# Patient Record
Sex: FEMALE | URBAN - NONMETROPOLITAN AREA
[De-identification: names, ages, dates, MRNs, and addresses within clinical notes are randomized per-mention and may not be internally consistent; named-entity substitution may affect disease eponyms.]

---

## 2024-04-06 ENCOUNTER — LAB REQUISITION (OUTPATIENT)
Dept: LAB | Facility: HOSPITAL | Age: 34
End: 2024-04-06
Attending: FAMILY MEDICINE
Payer: MEDICARE

## 2024-04-06 DIAGNOSIS — I10 ESSENTIAL (PRIMARY) HYPERTENSION: ICD-10-CM

## 2024-04-06 LAB
ALBUMIN SERPL BCP-MCNC: 2 G/DL (ref 3.5–5)
ALBUMIN/GLOB SERPL: 0.3 {RATIO}
ALP SERPL-CCNC: 84 U/L (ref 37–98)
ALT SERPL W P-5'-P-CCNC: 7 U/L (ref 13–56)
ANION GAP SERPL CALCULATED.3IONS-SCNC: 15 MMOL/L (ref 7–16)
AST SERPL W P-5'-P-CCNC: 18 U/L (ref 15–37)
BASOPHILS # BLD AUTO: 0.01 K/UL (ref 0–0.2)
BASOPHILS NFR BLD AUTO: 0.1 % (ref 0–1)
BILIRUB SERPL-MCNC: 0.6 MG/DL (ref ?–1.2)
BUN SERPL-MCNC: 44 MG/DL (ref 7–18)
BUN/CREAT SERPL: 11 (ref 6–20)
CALCIUM SERPL-MCNC: 8.7 MG/DL (ref 8.5–10.1)
CHLORIDE SERPL-SCNC: 100 MMOL/L (ref 98–107)
CO2 SERPL-SCNC: 26 MMOL/L (ref 21–32)
CREAT SERPL-MCNC: 4.13 MG/DL (ref 0.55–1.02)
DIFFERENTIAL METHOD BLD: ABNORMAL
EGFR (NO RACE VARIABLE) (RUSH/TITUS): 14 ML/MIN/1.73M2
EOSINOPHIL # BLD AUTO: 0.12 K/UL (ref 0–0.5)
EOSINOPHIL NFR BLD AUTO: 1.1 % (ref 1–4)
ERYTHROCYTE [DISTWIDTH] IN BLOOD BY AUTOMATED COUNT: 19.9 % (ref 11.5–14.5)
GLOBULIN SER-MCNC: 5.8 G/DL (ref 2–4)
GLUCOSE SERPL-MCNC: 155 MG/DL (ref 74–106)
HCT VFR BLD AUTO: 25.9 % (ref 38–47)
HGB BLD-MCNC: 8 G/DL (ref 12–16)
LYMPHOCYTES # BLD AUTO: 1.88 K/UL (ref 1–4.8)
LYMPHOCYTES NFR BLD AUTO: 16.8 % (ref 27–41)
MCH RBC QN AUTO: 29.3 PG (ref 27–31)
MCHC RBC AUTO-ENTMCNC: 30.9 G/DL (ref 32–36)
MCV RBC AUTO: 94.9 FL (ref 80–96)
MONOCYTES # BLD AUTO: 1.04 K/UL (ref 0–0.8)
MONOCYTES NFR BLD AUTO: 9.3 % (ref 2–6)
MPC BLD CALC-MCNC: 9.7 FL (ref 9.4–12.4)
NEUTROPHILS # BLD AUTO: 8.13 K/UL (ref 1.8–7.7)
NEUTROPHILS NFR BLD AUTO: 72.7 % (ref 53–65)
PLATELET # BLD AUTO: 237 K/UL (ref 150–400)
POTASSIUM SERPL-SCNC: 4.4 MMOL/L (ref 3.5–5.1)
PROT SERPL-MCNC: 7.8 G/DL (ref 6.4–8.2)
RBC # BLD AUTO: 2.73 M/UL (ref 4.2–5.4)
SODIUM SERPL-SCNC: 137 MMOL/L (ref 136–145)
WBC # BLD AUTO: 11.18 K/UL (ref 4.5–11)

## 2024-04-06 PROCEDURE — 80053 COMPREHEN METABOLIC PANEL: CPT | Performed by: FAMILY MEDICINE

## 2024-04-06 PROCEDURE — 85025 COMPLETE CBC W/AUTO DIFF WBC: CPT | Performed by: FAMILY MEDICINE

## 2024-04-17 ENCOUNTER — HOSPITAL ENCOUNTER (INPATIENT)
Facility: HOSPITAL | Age: 34
LOS: 12 days | Discharge: NURSING, REHAB OR LONG-TERM CARE | DRG: 853 | End: 2024-04-30
Attending: EMERGENCY MEDICINE | Admitting: STUDENT IN AN ORGANIZED HEALTH CARE EDUCATION/TRAINING PROGRAM
Payer: MEDICARE

## 2024-04-17 DIAGNOSIS — M32.14 OTHER SYSTEMIC LUPUS ERYTHEMATOSUS WITH GLOMERULAR DISEASE: ICD-10-CM

## 2024-04-17 DIAGNOSIS — L89.90 INFECTED DECUBITUS ULCER, UNSPECIFIED ULCER STAGE: ICD-10-CM

## 2024-04-17 DIAGNOSIS — Z99.2 ESRD (END STAGE RENAL DISEASE) ON DIALYSIS: ICD-10-CM

## 2024-04-17 DIAGNOSIS — D63.1 ANEMIA IN CHRONIC KIDNEY DISEASE, ON CHRONIC DIALYSIS: ICD-10-CM

## 2024-04-17 DIAGNOSIS — L97.909 CALCIPHYLAXIS WITH NONHEALING ULCER OF LEG: Primary | ICD-10-CM

## 2024-04-17 DIAGNOSIS — N18.6 ANEMIA IN CHRONIC KIDNEY DISEASE, ON CHRONIC DIALYSIS: ICD-10-CM

## 2024-04-17 DIAGNOSIS — L89.159 SACRAL DECUBITUS ULCER: ICD-10-CM

## 2024-04-17 DIAGNOSIS — L08.9 INFECTED DECUBITUS ULCER, UNSPECIFIED ULCER STAGE: ICD-10-CM

## 2024-04-17 DIAGNOSIS — Z99.2 ANEMIA IN CHRONIC KIDNEY DISEASE, ON CHRONIC DIALYSIS: ICD-10-CM

## 2024-04-17 DIAGNOSIS — L97.909 CALCIPHYLAXIS WITH NONHEALING ULCER OF LEG: ICD-10-CM

## 2024-04-17 DIAGNOSIS — N18.6 ESRD (END STAGE RENAL DISEASE) ON DIALYSIS: ICD-10-CM

## 2024-04-17 DIAGNOSIS — E83.59 CALCIPHYLAXIS WITH NONHEALING ULCER OF LEG: ICD-10-CM

## 2024-04-17 DIAGNOSIS — R00.0 TACHYCARDIA: ICD-10-CM

## 2024-04-17 DIAGNOSIS — F41.9 ANXIETY DISORDER, UNSPECIFIED TYPE: ICD-10-CM

## 2024-04-17 DIAGNOSIS — L89.159 PRESSURE INJURY OF SKIN OF SACRAL REGION, UNSPECIFIED INJURY STAGE: ICD-10-CM

## 2024-04-17 DIAGNOSIS — D62 ACUTE BLOOD LOSS ANEMIA: ICD-10-CM

## 2024-04-17 DIAGNOSIS — E83.59 CALCIPHYLAXIS WITH NONHEALING ULCER OF LEG: Primary | ICD-10-CM

## 2024-04-17 DIAGNOSIS — T14.8XXA WOUND INFECTION: ICD-10-CM

## 2024-04-17 DIAGNOSIS — L08.9 WOUND INFECTION: ICD-10-CM

## 2024-04-17 DIAGNOSIS — I15.9 SECONDARY HYPERTENSION: ICD-10-CM

## 2024-04-17 DIAGNOSIS — G89.18 POST-OP PAIN: ICD-10-CM

## 2024-04-17 DIAGNOSIS — E40 KWASHIORKOR: ICD-10-CM

## 2024-04-17 DIAGNOSIS — R50.9 FEVER: ICD-10-CM

## 2024-04-17 LAB
BASOPHILS # BLD AUTO: 0.02 K/UL (ref 0–0.2)
BASOPHILS NFR BLD AUTO: 0.2 % (ref 0–1)
DIFFERENTIAL METHOD BLD: ABNORMAL
EOSINOPHIL # BLD AUTO: 0.35 K/UL (ref 0–0.5)
EOSINOPHIL NFR BLD AUTO: 3.9 % (ref 1–4)
ERYTHROCYTE [DISTWIDTH] IN BLOOD BY AUTOMATED COUNT: 19.9 % (ref 11.5–14.5)
HCT VFR BLD AUTO: 22.2 % (ref 38–47)
HGB BLD-MCNC: 6.7 G/DL (ref 12–16)
IMM GRANULOCYTES # BLD AUTO: 0.1 K/UL (ref 0–0.04)
IMM GRANULOCYTES NFR BLD: 1.1 % (ref 0–0.4)
LYMPHOCYTES # BLD AUTO: 1.84 K/UL (ref 1–4.8)
LYMPHOCYTES NFR BLD AUTO: 20.7 % (ref 27–41)
MCH RBC QN AUTO: 28.2 PG (ref 27–31)
MCHC RBC AUTO-ENTMCNC: 30.2 G/DL (ref 32–36)
MCV RBC AUTO: 93.3 FL (ref 80–96)
MONOCYTES # BLD AUTO: 1.63 K/UL (ref 0–0.8)
MONOCYTES NFR BLD AUTO: 18.4 % (ref 2–6)
MPC BLD CALC-MCNC: 10.9 FL (ref 9.4–12.4)
NEUTROPHILS # BLD AUTO: 4.94 K/UL (ref 1.8–7.7)
NEUTROPHILS NFR BLD AUTO: 55.7 % (ref 53–65)
NRBC # BLD AUTO: 0 X10E3/UL
NRBC, AUTO (.00): 0 %
PLATELET # BLD AUTO: 210 K/UL (ref 150–400)
RBC # BLD AUTO: 2.38 M/UL (ref 4.2–5.4)
WBC # BLD AUTO: 8.88 K/UL (ref 4.5–11)

## 2024-04-17 PROCEDURE — 99285 EMERGENCY DEPT VISIT HI MDM: CPT | Mod: ,,, | Performed by: EMERGENCY MEDICINE

## 2024-04-17 PROCEDURE — 63600175 PHARM REV CODE 636 W HCPCS: Performed by: NURSE PRACTITIONER

## 2024-04-17 PROCEDURE — 93005 ELECTROCARDIOGRAM TRACING: CPT

## 2024-04-17 PROCEDURE — 83605 ASSAY OF LACTIC ACID: CPT | Performed by: NURSE PRACTITIONER

## 2024-04-17 PROCEDURE — 96361 HYDRATE IV INFUSION ADD-ON: CPT

## 2024-04-17 PROCEDURE — 87070 CULTURE OTHR SPECIMN AEROBIC: CPT | Performed by: NURSE PRACTITIONER

## 2024-04-17 PROCEDURE — 93010 ELECTROCARDIOGRAM REPORT: CPT | Mod: ,,, | Performed by: STUDENT IN AN ORGANIZED HEALTH CARE EDUCATION/TRAINING PROGRAM

## 2024-04-17 PROCEDURE — 96374 THER/PROPH/DIAG INJ IV PUSH: CPT

## 2024-04-17 PROCEDURE — 85610 PROTHROMBIN TIME: CPT | Performed by: NURSE PRACTITIONER

## 2024-04-17 PROCEDURE — 99285 EMERGENCY DEPT VISIT HI MDM: CPT | Mod: 25

## 2024-04-17 PROCEDURE — 36415 COLL VENOUS BLD VENIPUNCTURE: CPT | Performed by: NURSE PRACTITIONER

## 2024-04-17 PROCEDURE — 85025 COMPLETE CBC W/AUTO DIFF WBC: CPT | Performed by: NURSE PRACTITIONER

## 2024-04-17 PROCEDURE — 87040 BLOOD CULTURE FOR BACTERIA: CPT | Performed by: NURSE PRACTITIONER

## 2024-04-17 RX ORDER — HYDROMORPHONE HYDROCHLORIDE 2 MG/ML
1 INJECTION, SOLUTION INTRAMUSCULAR; INTRAVENOUS; SUBCUTANEOUS
Status: COMPLETED | OUTPATIENT
Start: 2024-04-17 | End: 2024-04-17

## 2024-04-17 RX ORDER — LEVOFLOXACIN 500 MG/1
500 TABLET, FILM COATED ORAL DAILY
Status: ON HOLD | COMMUNITY
Start: 2024-04-05 | End: 2024-04-30 | Stop reason: HOSPADM

## 2024-04-17 RX ORDER — FAMOTIDINE 20 MG/1
20 TABLET, FILM COATED ORAL DAILY
COMMUNITY

## 2024-04-17 RX ORDER — SEVELAMER CARBONATE 800 MG/1
1600 TABLET, FILM COATED ORAL
COMMUNITY
Start: 2024-04-04 | End: 2025-04-04

## 2024-04-17 RX ORDER — AMOXICILLIN 250 MG
2 CAPSULE ORAL 2 TIMES DAILY
COMMUNITY
Start: 2024-04-04 | End: 2025-04-04

## 2024-04-17 RX ORDER — OLANZAPINE 5 MG/1
5 TABLET, ORALLY DISINTEGRATING ORAL NIGHTLY
COMMUNITY
Start: 2024-04-04 | End: 2025-04-04

## 2024-04-17 RX ORDER — ONDANSETRON 4 MG/1
4 TABLET, ORALLY DISINTEGRATING ORAL EVERY 8 HOURS PRN
COMMUNITY
Start: 2024-04-04

## 2024-04-17 RX ORDER — ISOSORBIDE MONONITRATE 30 MG/1
30 TABLET, EXTENDED RELEASE ORAL DAILY
COMMUNITY
Start: 2024-04-05 | End: 2025-04-05

## 2024-04-17 RX ORDER — ALBUTEROL SULFATE 0.83 MG/ML
2.5 SOLUTION RESPIRATORY (INHALATION)
COMMUNITY
Start: 2024-04-04 | End: 2025-04-04

## 2024-04-17 RX ORDER — HYDRALAZINE HYDROCHLORIDE 100 MG/1
100 TABLET, FILM COATED ORAL EVERY 8 HOURS
COMMUNITY
Start: 2024-04-04 | End: 2025-04-04

## 2024-04-17 RX ORDER — OXYCODONE HYDROCHLORIDE 15 MG/1
15 TABLET ORAL EVERY 6 HOURS PRN
COMMUNITY
Start: 2024-04-08

## 2024-04-17 RX ORDER — CARVEDILOL 25 MG/1
1 TABLET ORAL 2 TIMES DAILY WITH MEALS
COMMUNITY
Start: 2024-01-23

## 2024-04-17 RX ORDER — BUPROPION HYDROCHLORIDE 75 MG/1
75 TABLET ORAL 2 TIMES DAILY
COMMUNITY
Start: 2024-01-23 | End: 2025-01-22

## 2024-04-17 RX ADMIN — HYDROMORPHONE HYDROCHLORIDE 1 MG: 2 INJECTION, SOLUTION INTRAMUSCULAR; INTRAVENOUS; SUBCUTANEOUS at 11:04

## 2024-04-18 ENCOUNTER — ANESTHESIA EVENT (OUTPATIENT)
Dept: SURGERY | Facility: HOSPITAL | Age: 34
DRG: 853 | End: 2024-04-18
Payer: MEDICARE

## 2024-04-18 ENCOUNTER — ANESTHESIA (OUTPATIENT)
Dept: SURGERY | Facility: HOSPITAL | Age: 34
DRG: 853 | End: 2024-04-18
Payer: MEDICARE

## 2024-04-18 PROBLEM — L89.90 PRESSURE INJURY OF SKIN WITH INFECTION: Status: ACTIVE | Noted: 2024-04-18

## 2024-04-18 PROBLEM — N25.81 SECONDARY HYPERPARATHYROIDISM: Status: ACTIVE | Noted: 2024-04-18

## 2024-04-18 PROBLEM — L89.159 SACRAL DECUBITUS ULCER: Status: ACTIVE | Noted: 2024-04-18

## 2024-04-18 PROBLEM — R82.81 PYURIA: Status: ACTIVE | Noted: 2024-04-18

## 2024-04-18 PROBLEM — A41.9 SEPSIS: Status: ACTIVE | Noted: 2024-04-18

## 2024-04-18 PROBLEM — L08.9 PRESSURE INJURY OF SKIN WITH INFECTION: Status: ACTIVE | Noted: 2024-04-18

## 2024-04-18 PROBLEM — S81.809A MULTIPLE OPEN WOUNDS OF LOWER LEG: Status: ACTIVE | Noted: 2024-04-18

## 2024-04-18 LAB
ABORH RETYPE: NORMAL
ALBUMIN SERPL BCP-MCNC: 1.9 G/DL (ref 3.5–5)
ALBUMIN/GLOB SERPL: 0.4 {RATIO}
ALP SERPL-CCNC: 72 U/L (ref 37–98)
ALT SERPL W P-5'-P-CCNC: <6 U/L (ref 13–56)
ANION GAP SERPL CALCULATED.3IONS-SCNC: 11 MMOL/L (ref 7–16)
APTT PPP: 43.7 SECONDS (ref 25.2–37.3)
AST SERPL W P-5'-P-CCNC: <3 U/L (ref 15–37)
B-HCG UR QL: NEGATIVE
BACTERIA #/AREA URNS HPF: ABNORMAL /HPF
BILIRUB SERPL-MCNC: 0.5 MG/DL (ref ?–1.2)
BILIRUB UR QL STRIP: NEGATIVE
BUN SERPL-MCNC: 41 MG/DL (ref 7–18)
BUN/CREAT SERPL: 8 (ref 6–20)
CALCIUM SERPL-MCNC: 8.6 MG/DL (ref 8.5–10.1)
CHLORIDE SERPL-SCNC: 100 MMOL/L (ref 98–107)
CLARITY UR: ABNORMAL
CO2 SERPL-SCNC: 30 MMOL/L (ref 21–32)
COLOR UR: ABNORMAL
CREAT SERPL-MCNC: 4.98 MG/DL (ref 0.55–1.02)
CRP SERPL-MCNC: 10.2 MG/DL (ref 0–0.8)
CTP QC/QA: YES
EGFR (NO RACE VARIABLE) (RUSH/TITUS): 11 ML/MIN/1.73M2
GLOBULIN SER-MCNC: 5.1 G/DL (ref 2–4)
GLUCOSE SERPL-MCNC: 90 MG/DL (ref 74–106)
GLUCOSE UR STRIP-MCNC: NORMAL MG/DL
HYALINE CASTS #/AREA URNS LPF: ABNORMAL /LPF
INDIRECT COOMBS: NORMAL
INFLUENZA A MOLECULAR (OHS): NEGATIVE
INFLUENZA B MOLECULAR (OHS): NEGATIVE
INR BLD: 1.38
KETONES UR STRIP-SCNC: NEGATIVE MG/DL
LACTATE SERPL-SCNC: 0.8 MMOL/L (ref 0.4–2)
LEUKOCYTE ESTERASE UR QL STRIP: ABNORMAL
NITRITE UR QL STRIP: NEGATIVE
PH UR STRIP: 8.5 PH UNITS
POC OCCULT BLOOD STOOL: NEGATIVE
POTASSIUM SERPL-SCNC: 4.5 MMOL/L (ref 3.5–5.1)
PREALB SERPL NEPH-MCNC: 12 MG/DL (ref 20–40)
PROT SERPL-MCNC: 7 G/DL (ref 6.4–8.2)
PROT UR QL STRIP: 100
PROTHROMBIN TIME: 16.8 SECONDS (ref 11.7–14.7)
RBC # UR STRIP: NEGATIVE /UL
RBC #/AREA URNS HPF: 2 /HPF
RH BLD: NORMAL
SARS-COV-2 RDRP RESP QL NAA+PROBE: NEGATIVE
SODIUM SERPL-SCNC: 136 MMOL/L (ref 136–145)
SP GR UR STRIP: 1.01
SPECIMEN OUTDATE: NORMAL
SQUAMOUS #/AREA URNS LPF: ABNORMAL /HPF
TRICHOMONAS #/AREA URNS HPF: ABNORMAL /HPF
UROBILINOGEN UR STRIP-ACNC: NORMAL MG/DL
WBC #/AREA URNS HPF: 75 /HPF

## 2024-04-18 PROCEDURE — 94761 N-INVAS EAR/PLS OXIMETRY MLT: CPT

## 2024-04-18 PROCEDURE — 27000177 HC AIRWAY, LARYNGEAL MASK: Performed by: ANESTHESIOLOGY

## 2024-04-18 PROCEDURE — 63600175 PHARM REV CODE 636 W HCPCS: Performed by: NURSE ANESTHETIST, CERTIFIED REGISTERED

## 2024-04-18 PROCEDURE — 84134 ASSAY OF PREALBUMIN: CPT | Performed by: HOSPITALIST

## 2024-04-18 PROCEDURE — 0JDL0ZZ EXTRACTION OF RIGHT UPPER LEG SUBCUTANEOUS TISSUE AND FASCIA, OPEN APPROACH: ICD-10-PCS | Performed by: STUDENT IN AN ORGANIZED HEALTH CARE EDUCATION/TRAINING PROGRAM

## 2024-04-18 PROCEDURE — 0JDM0ZZ EXTRACTION OF LEFT UPPER LEG SUBCUTANEOUS TISSUE AND FASCIA, OPEN APPROACH: ICD-10-PCS | Performed by: STUDENT IN AN ORGANIZED HEALTH CARE EDUCATION/TRAINING PROGRAM

## 2024-04-18 PROCEDURE — 25000003 PHARM REV CODE 250: Performed by: STUDENT IN AN ORGANIZED HEALTH CARE EDUCATION/TRAINING PROGRAM

## 2024-04-18 PROCEDURE — 11000001 HC ACUTE MED/SURG PRIVATE ROOM

## 2024-04-18 PROCEDURE — 99223 1ST HOSP IP/OBS HIGH 75: CPT | Mod: ,,, | Performed by: HOSPITALIST

## 2024-04-18 PROCEDURE — 87086 URINE CULTURE/COLONY COUNT: CPT | Performed by: NURSE PRACTITIONER

## 2024-04-18 PROCEDURE — 63600175 PHARM REV CODE 636 W HCPCS: Performed by: EMERGENCY MEDICINE

## 2024-04-18 PROCEDURE — 36000706: Performed by: STUDENT IN AN ORGANIZED HEALTH CARE EDUCATION/TRAINING PROGRAM

## 2024-04-18 PROCEDURE — 81001 URINALYSIS AUTO W/SCOPE: CPT | Performed by: NURSE PRACTITIONER

## 2024-04-18 PROCEDURE — 63600175 PHARM REV CODE 636 W HCPCS: Mod: JZ,JG | Performed by: STUDENT IN AN ORGANIZED HEALTH CARE EDUCATION/TRAINING PROGRAM

## 2024-04-18 PROCEDURE — 25000003 PHARM REV CODE 250: Performed by: EMERGENCY MEDICINE

## 2024-04-18 PROCEDURE — 0KBQ0ZZ EXCISION OF RIGHT UPPER LEG MUSCLE, OPEN APPROACH: ICD-10-PCS | Performed by: STUDENT IN AN ORGANIZED HEALTH CARE EDUCATION/TRAINING PROGRAM

## 2024-04-18 PROCEDURE — 37000009 HC ANESTHESIA EA ADD 15 MINS: Performed by: STUDENT IN AN ORGANIZED HEALTH CARE EDUCATION/TRAINING PROGRAM

## 2024-04-18 PROCEDURE — 27000716 HC OXISENSOR PROBE, ANY SIZE: Performed by: ANESTHESIOLOGY

## 2024-04-18 PROCEDURE — 90935 HEMODIALYSIS ONE EVALUATION: CPT

## 2024-04-18 PROCEDURE — 0KBN0ZZ EXCISION OF RIGHT HIP MUSCLE, OPEN APPROACH: ICD-10-PCS | Performed by: STUDENT IN AN ORGANIZED HEALTH CARE EDUCATION/TRAINING PROGRAM

## 2024-04-18 PROCEDURE — 27000510 HC BLANKET BAIR HUGGER ANY SIZE: Performed by: ANESTHESIOLOGY

## 2024-04-18 PROCEDURE — 11045 DBRDMT SUBQ TISS EACH ADDL: CPT | Mod: ,,, | Performed by: STUDENT IN AN ORGANIZED HEALTH CARE EDUCATION/TRAINING PROGRAM

## 2024-04-18 PROCEDURE — 80053 COMPREHEN METABOLIC PANEL: CPT | Performed by: NURSE PRACTITIONER

## 2024-04-18 PROCEDURE — 36000707: Performed by: STUDENT IN AN ORGANIZED HEALTH CARE EDUCATION/TRAINING PROGRAM

## 2024-04-18 PROCEDURE — 86850 RBC ANTIBODY SCREEN: CPT | Performed by: NURSE PRACTITIONER

## 2024-04-18 PROCEDURE — 71000039 HC RECOVERY, EACH ADD'L HOUR: Performed by: STUDENT IN AN ORGANIZED HEALTH CARE EDUCATION/TRAINING PROGRAM

## 2024-04-18 PROCEDURE — 87635 SARS-COV-2 COVID-19 AMP PRB: CPT | Performed by: NURSE PRACTITIONER

## 2024-04-18 PROCEDURE — 99223 1ST HOSP IP/OBS HIGH 75: CPT | Mod: 25,ICN,, | Performed by: STUDENT IN AN ORGANIZED HEALTH CARE EDUCATION/TRAINING PROGRAM

## 2024-04-18 PROCEDURE — 93005 ELECTROCARDIOGRAM TRACING: CPT

## 2024-04-18 PROCEDURE — 88304 TISSUE EXAM BY PATHOLOGIST: CPT | Mod: TC,SUR | Performed by: STUDENT IN AN ORGANIZED HEALTH CARE EDUCATION/TRAINING PROGRAM

## 2024-04-18 PROCEDURE — 87502 INFLUENZA DNA AMP PROBE: CPT | Performed by: NURSE PRACTITIONER

## 2024-04-18 PROCEDURE — 0KBR0ZZ EXCISION OF LEFT UPPER LEG MUSCLE, OPEN APPROACH: ICD-10-PCS | Performed by: STUDENT IN AN ORGANIZED HEALTH CARE EDUCATION/TRAINING PROGRAM

## 2024-04-18 PROCEDURE — 63600175 PHARM REV CODE 636 W HCPCS: Performed by: HOSPITALIST

## 2024-04-18 PROCEDURE — 93010 ELECTROCARDIOGRAM REPORT: CPT | Mod: ,,, | Performed by: STUDENT IN AN ORGANIZED HEALTH CARE EDUCATION/TRAINING PROGRAM

## 2024-04-18 PROCEDURE — 37000008 HC ANESTHESIA 1ST 15 MINUTES: Performed by: STUDENT IN AN ORGANIZED HEALTH CARE EDUCATION/TRAINING PROGRAM

## 2024-04-18 PROCEDURE — 0KBP0ZZ EXCISION OF LEFT HIP MUSCLE, OPEN APPROACH: ICD-10-PCS | Performed by: STUDENT IN AN ORGANIZED HEALTH CARE EDUCATION/TRAINING PROGRAM

## 2024-04-18 PROCEDURE — 25000003 PHARM REV CODE 250: Performed by: HOSPITALIST

## 2024-04-18 PROCEDURE — 71000033 HC RECOVERY, INTIAL HOUR: Performed by: STUDENT IN AN ORGANIZED HEALTH CARE EDUCATION/TRAINING PROGRAM

## 2024-04-18 PROCEDURE — 82272 OCCULT BLD FECES 1-3 TESTS: CPT

## 2024-04-18 PROCEDURE — D9220A PRA ANESTHESIA: Mod: CRNA,,, | Performed by: NURSE ANESTHETIST, CERTIFIED REGISTERED

## 2024-04-18 PROCEDURE — 88304 TISSUE EXAM BY PATHOLOGIST: CPT | Mod: 26,,, | Performed by: PATHOLOGY

## 2024-04-18 PROCEDURE — 86140 C-REACTIVE PROTEIN: CPT | Performed by: HOSPITALIST

## 2024-04-18 PROCEDURE — 81025 URINE PREGNANCY TEST: CPT | Performed by: HOSPITALIST

## 2024-04-18 PROCEDURE — D9220A PRA ANESTHESIA: Mod: ANES,,, | Performed by: ANESTHESIOLOGY

## 2024-04-18 PROCEDURE — 11042 DBRDMT SUBQ TIS 1ST 20SQCM/<: CPT | Mod: ,,, | Performed by: STUDENT IN AN ORGANIZED HEALTH CARE EDUCATION/TRAINING PROGRAM

## 2024-04-18 PROCEDURE — 63600175 PHARM REV CODE 636 W HCPCS: Performed by: ANESTHESIOLOGY

## 2024-04-18 PROCEDURE — 25000003 PHARM REV CODE 250: Performed by: NURSE ANESTHETIST, CERTIFIED REGISTERED

## 2024-04-18 RX ORDER — PROPOFOL 10 MG/ML
VIAL (ML) INTRAVENOUS
Status: DISCONTINUED | OUTPATIENT
Start: 2024-04-18 | End: 2024-04-18

## 2024-04-18 RX ORDER — TALC
6 POWDER (GRAM) TOPICAL NIGHTLY PRN
Status: DISCONTINUED | OUTPATIENT
Start: 2024-04-18 | End: 2024-04-30 | Stop reason: HOSPADM

## 2024-04-18 RX ORDER — MORPHINE SULFATE 2 MG/ML
7 INJECTION, SOLUTION INTRAMUSCULAR; INTRAVENOUS EVERY 6 HOURS PRN
Status: DISCONTINUED | OUTPATIENT
Start: 2024-04-18 | End: 2024-04-18

## 2024-04-18 RX ORDER — BISACODYL 5 MG
10 TABLET, DELAYED RELEASE (ENTERIC COATED) ORAL DAILY PRN
Status: DISCONTINUED | OUTPATIENT
Start: 2024-04-18 | End: 2024-04-30 | Stop reason: HOSPADM

## 2024-04-18 RX ORDER — SEVELAMER CARBONATE 800 MG/1
1600 TABLET, FILM COATED ORAL
Status: DISCONTINUED | OUTPATIENT
Start: 2024-04-18 | End: 2024-04-30 | Stop reason: HOSPADM

## 2024-04-18 RX ORDER — PHENYLEPHRINE HYDROCHLORIDE 10 MG/ML
INJECTION INTRAVENOUS
Status: DISCONTINUED | OUTPATIENT
Start: 2024-04-18 | End: 2024-04-18

## 2024-04-18 RX ORDER — OXYCODONE HYDROCHLORIDE 5 MG/1
15 TABLET ORAL EVERY 6 HOURS PRN
Status: DISCONTINUED | OUTPATIENT
Start: 2024-04-18 | End: 2024-04-30 | Stop reason: HOSPADM

## 2024-04-18 RX ORDER — BUPROPION HYDROCHLORIDE 75 MG/1
75 TABLET ORAL 2 TIMES DAILY
Status: DISCONTINUED | OUTPATIENT
Start: 2024-04-18 | End: 2024-04-30 | Stop reason: HOSPADM

## 2024-04-18 RX ORDER — DIPHENHYDRAMINE HYDROCHLORIDE 50 MG/ML
25 INJECTION INTRAMUSCULAR; INTRAVENOUS EVERY 6 HOURS PRN
Status: DISCONTINUED | OUTPATIENT
Start: 2024-04-18 | End: 2024-04-18

## 2024-04-18 RX ORDER — SIMETHICONE 80 MG
1 TABLET,CHEWABLE ORAL 3 TIMES DAILY PRN
Status: DISCONTINUED | OUTPATIENT
Start: 2024-04-18 | End: 2024-04-30 | Stop reason: HOSPADM

## 2024-04-18 RX ORDER — FENTANYL CITRATE 50 UG/ML
INJECTION, SOLUTION INTRAMUSCULAR; INTRAVENOUS
Status: DISCONTINUED | OUTPATIENT
Start: 2024-04-18 | End: 2024-04-18

## 2024-04-18 RX ORDER — GUAIFENESIN AND DEXTROMETHORPHAN HYDROBROMIDE 10; 100 MG/5ML; MG/5ML
10 SYRUP ORAL EVERY 6 HOURS PRN
Status: DISCONTINUED | OUTPATIENT
Start: 2024-04-18 | End: 2024-04-30 | Stop reason: HOSPADM

## 2024-04-18 RX ORDER — SODIUM CHLORIDE 9 MG/ML
INJECTION, SOLUTION INTRAVENOUS
Status: DISCONTINUED | OUTPATIENT
Start: 2024-04-18 | End: 2024-04-30 | Stop reason: HOSPADM

## 2024-04-18 RX ORDER — ACETAMINOPHEN 500 MG
1000 TABLET ORAL EVERY 6 HOURS PRN
Status: DISCONTINUED | OUTPATIENT
Start: 2024-04-18 | End: 2024-04-30 | Stop reason: HOSPADM

## 2024-04-18 RX ORDER — MORPHINE SULFATE 4 MG/ML
4 INJECTION, SOLUTION INTRAMUSCULAR; INTRAVENOUS EVERY 6 HOURS PRN
Status: DISCONTINUED | OUTPATIENT
Start: 2024-04-18 | End: 2024-04-23

## 2024-04-18 RX ORDER — ONDANSETRON HYDROCHLORIDE 2 MG/ML
INJECTION, SOLUTION INTRAVENOUS
Status: DISCONTINUED | OUTPATIENT
Start: 2024-04-18 | End: 2024-04-18

## 2024-04-18 RX ORDER — FAMOTIDINE 20 MG/1
20 TABLET, FILM COATED ORAL DAILY
Status: DISCONTINUED | OUTPATIENT
Start: 2024-04-18 | End: 2024-04-30 | Stop reason: HOSPADM

## 2024-04-18 RX ORDER — ISOSORBIDE MONONITRATE 30 MG/1
30 TABLET, EXTENDED RELEASE ORAL DAILY
Status: DISCONTINUED | OUTPATIENT
Start: 2024-04-18 | End: 2024-04-30 | Stop reason: HOSPADM

## 2024-04-18 RX ORDER — MEPERIDINE HYDROCHLORIDE 25 MG/ML
25 INJECTION INTRAMUSCULAR; INTRAVENOUS; SUBCUTANEOUS EVERY 10 MIN PRN
Status: DISCONTINUED | OUTPATIENT
Start: 2024-04-18 | End: 2024-04-18

## 2024-04-18 RX ORDER — AMOXICILLIN 250 MG
2 CAPSULE ORAL 2 TIMES DAILY
Status: DISCONTINUED | OUTPATIENT
Start: 2024-04-18 | End: 2024-04-30 | Stop reason: HOSPADM

## 2024-04-18 RX ORDER — MUPIROCIN 20 MG/G
OINTMENT TOPICAL 2 TIMES DAILY
Status: DISCONTINUED | OUTPATIENT
Start: 2024-04-18 | End: 2024-04-19

## 2024-04-18 RX ORDER — SODIUM CHLORIDE, SODIUM LACTATE, POTASSIUM CHLORIDE, CALCIUM CHLORIDE 600; 310; 30; 20 MG/100ML; MG/100ML; MG/100ML; MG/100ML
125 INJECTION, SOLUTION INTRAVENOUS CONTINUOUS
Status: DISCONTINUED | OUTPATIENT
Start: 2024-04-18 | End: 2024-04-18

## 2024-04-18 RX ORDER — OXYCODONE HYDROCHLORIDE 5 MG/1
5 TABLET ORAL
Status: DISCONTINUED | OUTPATIENT
Start: 2024-04-18 | End: 2024-04-18

## 2024-04-18 RX ORDER — LIDOCAINE HYDROCHLORIDE 20 MG/ML
INJECTION, SOLUTION EPIDURAL; INFILTRATION; INTRACAUDAL; PERINEURAL
Status: DISCONTINUED | OUTPATIENT
Start: 2024-04-18 | End: 2024-04-18

## 2024-04-18 RX ORDER — MORPHINE SULFATE 10 MG/ML
4 INJECTION INTRAMUSCULAR; INTRAVENOUS; SUBCUTANEOUS EVERY 5 MIN PRN
Status: DISCONTINUED | OUTPATIENT
Start: 2024-04-18 | End: 2024-04-18

## 2024-04-18 RX ORDER — HEPARIN SODIUM 5000 [USP'U]/ML
5000 INJECTION, SOLUTION INTRAVENOUS; SUBCUTANEOUS EVERY 12 HOURS
Status: DISCONTINUED | OUTPATIENT
Start: 2024-04-18 | End: 2024-04-30 | Stop reason: HOSPADM

## 2024-04-18 RX ORDER — OLANZAPINE 5 MG/1
5 TABLET ORAL NIGHTLY
Status: DISCONTINUED | OUTPATIENT
Start: 2024-04-18 | End: 2024-04-30 | Stop reason: HOSPADM

## 2024-04-18 RX ORDER — CARVEDILOL 25 MG/1
25 TABLET ORAL 2 TIMES DAILY WITH MEALS
Status: DISCONTINUED | OUTPATIENT
Start: 2024-04-18 | End: 2024-04-30 | Stop reason: HOSPADM

## 2024-04-18 RX ORDER — ONDANSETRON HYDROCHLORIDE 2 MG/ML
8 INJECTION, SOLUTION INTRAVENOUS EVERY 6 HOURS PRN
Status: DISCONTINUED | OUTPATIENT
Start: 2024-04-18 | End: 2024-04-30 | Stop reason: HOSPADM

## 2024-04-18 RX ORDER — MEPERIDINE HYDROCHLORIDE 25 MG/ML
INJECTION INTRAMUSCULAR; INTRAVENOUS; SUBCUTANEOUS
Status: DISCONTINUED | OUTPATIENT
Start: 2024-04-18 | End: 2024-04-18

## 2024-04-18 RX ORDER — TRAZODONE HYDROCHLORIDE 50 MG/1
50 TABLET ORAL NIGHTLY PRN
Status: DISCONTINUED | OUTPATIENT
Start: 2024-04-18 | End: 2024-04-30 | Stop reason: HOSPADM

## 2024-04-18 RX ORDER — HYDRALAZINE HYDROCHLORIDE 100 MG/1
100 TABLET, FILM COATED ORAL EVERY 8 HOURS
Status: DISCONTINUED | OUTPATIENT
Start: 2024-04-18 | End: 2024-04-19

## 2024-04-18 RX ORDER — MORPHINE SULFATE 4 MG/ML
4 INJECTION, SOLUTION INTRAMUSCULAR; INTRAVENOUS ONCE
Status: COMPLETED | OUTPATIENT
Start: 2024-04-18 | End: 2024-04-18

## 2024-04-18 RX ORDER — HYDROMORPHONE HYDROCHLORIDE 2 MG/ML
0.5 INJECTION, SOLUTION INTRAMUSCULAR; INTRAVENOUS; SUBCUTANEOUS EVERY 5 MIN PRN
Status: DISCONTINUED | OUTPATIENT
Start: 2024-04-18 | End: 2024-04-18

## 2024-04-18 RX ORDER — MIDAZOLAM HYDROCHLORIDE 1 MG/ML
INJECTION INTRAMUSCULAR; INTRAVENOUS
Status: DISCONTINUED | OUTPATIENT
Start: 2024-04-18 | End: 2024-04-18

## 2024-04-18 RX ORDER — ONDANSETRON HYDROCHLORIDE 2 MG/ML
4 INJECTION, SOLUTION INTRAVENOUS DAILY PRN
Status: DISCONTINUED | OUTPATIENT
Start: 2024-04-18 | End: 2024-04-18

## 2024-04-18 RX ADMIN — OXYCODONE 15 MG: 5 TABLET ORAL at 05:04

## 2024-04-18 RX ADMIN — SODIUM CHLORIDE 1000 ML: 9 INJECTION, SOLUTION INTRAVENOUS at 12:04

## 2024-04-18 RX ADMIN — LIDOCAINE HYDROCHLORIDE 50 MG: 20 INJECTION, SOLUTION INTRAVENOUS at 09:04

## 2024-04-18 RX ADMIN — HYDROMORPHONE HYDROCHLORIDE 0.5 MG: 2 INJECTION, SOLUTION INTRAMUSCULAR; INTRAVENOUS; SUBCUTANEOUS at 11:04

## 2024-04-18 RX ADMIN — MEPERIDINE HYDROCHLORIDE 7.5 MG: 25 INJECTION INTRAMUSCULAR; INTRAVENOUS; SUBCUTANEOUS at 11:04

## 2024-04-18 RX ADMIN — HYDRALAZINE HYDROCHLORIDE 100 MG: 100 TABLET, FILM COATED ORAL at 05:04

## 2024-04-18 RX ADMIN — ACETAMINOPHEN 1000 MG: 500 TABLET ORAL at 05:04

## 2024-04-18 RX ADMIN — PHENYLEPHRINE HYDROCHLORIDE 100 MCG: 10 INJECTION INTRAVENOUS at 09:04

## 2024-04-18 RX ADMIN — MEPERIDINE HYDROCHLORIDE 5 MG: 25 INJECTION INTRAMUSCULAR; INTRAVENOUS; SUBCUTANEOUS at 10:04

## 2024-04-18 RX ADMIN — OLANZAPINE 5 MG: 5 TABLET, FILM COATED ORAL at 08:04

## 2024-04-18 RX ADMIN — SENNOSIDES AND DOCUSATE SODIUM 2 TABLET: 8.6; 5 TABLET ORAL at 08:04

## 2024-04-18 RX ADMIN — MORPHINE SULFATE 4 MG: 4 INJECTION, SOLUTION INTRAMUSCULAR; INTRAVENOUS at 08:04

## 2024-04-18 RX ADMIN — SEVELAMER CARBONATE 1600 MG: 800 TABLET, FILM COATED ORAL at 06:04

## 2024-04-18 RX ADMIN — MIDAZOLAM HYDROCHLORIDE 2 MG: 1 INJECTION, SOLUTION INTRAMUSCULAR; INTRAVENOUS at 09:04

## 2024-04-18 RX ADMIN — VANCOMYCIN HYDROCHLORIDE 1250 MG: 1 INJECTION, POWDER, LYOPHILIZED, FOR SOLUTION INTRAVENOUS at 05:04

## 2024-04-18 RX ADMIN — FENTANYL CITRATE 100 MCG: 50 INJECTION INTRAMUSCULAR; INTRAVENOUS at 09:04

## 2024-04-18 RX ADMIN — CARVEDILOL 25 MG: 25 TABLET, FILM COATED ORAL at 05:04

## 2024-04-18 RX ADMIN — PHENYLEPHRINE HYDROCHLORIDE 300 MCG: 10 INJECTION INTRAVENOUS at 09:04

## 2024-04-18 RX ADMIN — MEPERIDINE HYDROCHLORIDE 7.5 MG: 25 INJECTION INTRAMUSCULAR; INTRAVENOUS; SUBCUTANEOUS at 10:04

## 2024-04-18 RX ADMIN — PHENYLEPHRINE HYDROCHLORIDE 300 MCG: 10 INJECTION INTRAVENOUS at 10:04

## 2024-04-18 RX ADMIN — PHENYLEPHRINE HYDROCHLORIDE 200 MCG: 10 INJECTION INTRAVENOUS at 09:04

## 2024-04-18 RX ADMIN — ACETAMINOPHEN 1000 MG: 500 TABLET ORAL at 08:04

## 2024-04-18 RX ADMIN — PHENYLEPHRINE HYDROCHLORIDE 200 MCG: 10 INJECTION INTRAVENOUS at 11:04

## 2024-04-18 RX ADMIN — BUPROPION HYDROCHLORIDE 75 MG: 75 TABLET, FILM COATED ORAL at 08:04

## 2024-04-18 RX ADMIN — MORPHINE SULFATE 4 MG: 4 INJECTION INTRAVENOUS at 04:04

## 2024-04-18 RX ADMIN — HEPARIN SODIUM 5000 UNITS: 5000 INJECTION, SOLUTION INTRAVENOUS; SUBCUTANEOUS at 08:04

## 2024-04-18 RX ADMIN — MUPIROCIN: 20 OINTMENT TOPICAL at 08:04

## 2024-04-18 RX ADMIN — PHENYLEPHRINE HYDROCHLORIDE 400 MCG: 10 INJECTION INTRAVENOUS at 10:04

## 2024-04-18 RX ADMIN — PIPERACILLIN AND TAZOBACTAM 4.5 G: 4; .5 INJECTION, POWDER, LYOPHILIZED, FOR SOLUTION INTRAVENOUS; PARENTERAL at 04:04

## 2024-04-18 RX ADMIN — PIPERACILLIN AND TAZOBACTAM 4.5 G: 4; .5 INJECTION, POWDER, FOR SOLUTION INTRAVENOUS; PARENTERAL at 09:04

## 2024-04-18 RX ADMIN — PROPOFOL 125 MG: 10 INJECTION, EMULSION INTRAVENOUS at 09:04

## 2024-04-18 RX ADMIN — SODIUM CHLORIDE: 9 INJECTION, SOLUTION INTRAVENOUS at 09:04

## 2024-04-18 RX ADMIN — ONDANSETRON 4 MG: 2 INJECTION INTRAMUSCULAR; INTRAVENOUS at 10:04

## 2024-04-18 RX ADMIN — SEVELAMER CARBONATE 1600 MG: 800 TABLET, FILM COATED ORAL at 05:04

## 2024-04-18 NOTE — ASSESSMENT & PLAN NOTE
To the OR for debridement    Risks and benefits explained with the patient including risks for infection, bleeding, injury to surrounding structures, hematoma/seroma formation with need for possible evacuation, possible open.  The patient verbalized understanding, agrees and wishes to proceed with surgery.

## 2024-04-18 NOTE — ED TRIAGE NOTES
EMS from [facility] with c/o fever today of 104.1. Report from nurse stating patient has multiple wounds including thighs, hip and stage 4 healing sacral wound. Sent here to eval for possible surgery/wound care for extensive wounds with foul odor and purulent drainage.

## 2024-04-18 NOTE — OP NOTE
[facility] - Periop Services  Surgery Department  Operative Note    SUMMARY     Date of Procedure: 4/18/2024     Procedure: Procedure(s) (LRB):  DEBRIDEMENT, WOUND, SACRUM (N/A)  DEBRIDEMENT, LOWER EXTREMITY (Bilateral)     Surgeons and Role:     * Henok Gage, DO - Primary    Assisting Surgeon: None    Pre-Operative Diagnosis: Wound infection [T14.8XXA, L08.9]  Sacral decubitus ulcer [L89.159]    Post-Operative Diagnosis: Post-Op Diagnosis Codes:     * Wound infection [T14.8XXA, L08.9]     * Sacral decubitus ulcer [L89.159]    Anesthesia: General    Operative Findings (including complications, if any): Left lateral thigh wound: [id number]  Right inner thigh: 55l04o9.5cm  Right hip: 12x9x0.5cm  Sacrum: 8x8x0.5cm  Right anterior thigh: 66m38t9.5cm  Left anterior thigh: 24x7x.0.5cm     Description of Technical Procedures:  Patient was taken the operating room and placed on the operating table in right lateral decubitus position.  Patient underwent general anesthesia per the anesthesia team.  The back and left leg were then prepped and draped in usual sterile fashion.  Patient had multiple wounds across the left lateral thigh and left anterior thigh and sacrum.  We debrided all 3 wounds of the left lateral thigh, left anterior thigh in the sacrum with sharp excisional debridement with a curette down to the muscle circumferentially.  We irrigated the cavities and suctioned clear, bleeding controlled with electrocautery.  Left lateral thigh measured 13 x 8 x 1 cm, left anterior thigh measured 24 x 7 x 0.5 cm and the sacrum measured 8 x 8 x 0.5 cm.  Sterile dressing applied.  We then had a turned the patient supine.  We then prepped and draped the right leg sterile fashion.  We then used a curette to perform a sharp excisional debridement all way down to muscle on the right inner thigh, right hip and the right anterior thigh.  Right inner thigh measured 20 x 10 x 0.5 cm, right hip measured 12 x 9 x 0.5 cm,  the right anterior thigh measured 15 x 10 x 0.5 cm.  We irrigated the cavity and suctioned clear, bleeding controlled electrocautery.  Sterile dressing applied.  Patient was then awakened, extubated and taken the PACU in stable condition.  Patient tolerated procedure well.        Estimated Blood Loss (EBL): 25cc           Implants: * No implants in log *    Specimens:   Specimen (24h ago, onward)       Start     Ordered    04/18/24 1020  Surgical Pathology  RELEASE UPON ORDERING         04/18/24 1020                            Condition: Good    Disposition: PACU - hemodynamically stable.    Attestation: Op Note Attestation: I was physically present and scrubbed for the entire procedure.

## 2024-04-18 NOTE — PLAN OF CARE
Recommend to advance diet as medically appropriate to Renal High protein. Recommend to add Novasource Renal all meals and Keegan BID when diet advanced. RD Following.

## 2024-04-18 NOTE — PROGRESS NOTES
Pharmacy consulted to assist with the management of vancomycin in this nursing home patient to treat: multiple wounds including stage 4 sacral wound    Patient weight: 76.7 kg  Patient CrCl: ~18 ml/min    While H&P is pending, will give a one time dose of vancomycin 1250mg and follow up once more information is available.    Pharmacy will continue to monitor and make adjustments as needed.      Thank you for the consult,    Kenia Trejo, PharmD  [phone]

## 2024-04-18 NOTE — NURSING
0435 Recd patient from emergency room via stretcher alert awake resp even and unlabored no acute distress  noted skin warm/dry with mutiple wounds noted to bilateral lower extremities and sacral Int noted to right hand/right antecubital abdomen round and distended with hypoactive bowel sounds noted

## 2024-04-18 NOTE — CARE UPDATE
Sepsis secondary presumably to lower extremity cellulitis.  We will follow urine cultures as well.  Continue antibiotics.  Judicious use of IV fluids given ESRD.  Lactate normal.  Continue dialysis.  Debridement today.  Patient was sleepy but doing well during my interview.

## 2024-04-18 NOTE — ASSESSMENT & PLAN NOTE
Nutrition consulted. Most recent weight and BMI monitored-     Measurements:  Wt Readings from Last 1 Encounters:   04/17/24 76.7 kg (169 lb)   Body mass index is 24.96 kg/m².    Nutrition consult and PAB ordered.    Patient has been screened and assessed by RD.    Malnutrition Type:  Context:    Level:      Malnutrition Characteristic Summary:       Interventions/Recommendations (treatment strategy):

## 2024-04-18 NOTE — CONSULTS
[facility] - Periop [facility]  General Surgery  Consult Note    Patient Name: Madhav Correa  MRN: [id number]  Code Status: Full Code  Admission Date: 4/17/2024  Hospital Length of Stay: 0 days  Attending Physician: Elvis Ndiaye DO  Primary Care Provider: [facility]    Patient information was obtained from patient and ER records.     Inpatient consult to General Surgery  Consult performed by: Henok Gage DO  Consult ordered by: Alejandrina Duggan MD        Subjective:     Principal Problem: <principal problem not specified>    History of Present Illness: 33-year-old  female admitted to the hospital for nonhealing wounds and unstageable sacral decubitus to backside in bilateral lower extremities.  History of lupus, end-stage renal disease on hemodialysis, severe calciphylaxis; coming in from nursing home.  Patient on IV antibiotics.  General surgery consulted for unstageable sacral decubitus with necrosis/eschar and bilateral lower extremity wounds.  Denies any chest pain/shortness of breath, nausea/vomiting/diarrhea, fever/chills.    Current Facility-Administered Medications   Medication Dose Route Frequency Provider Last Rate Last Admin    acetaminophen tablet 1,000 mg  1,000 mg Oral Q6H PRN Alejandrina Duggan MD   1,000 mg at 04/18/24 0841    bisacodyL EC tablet 10 mg  10 mg Oral Daily PRN Alejandrina Duggan MD        buPROPion tablet 75 mg  75 mg Oral BID Alejandrina Duggan MD        carvediloL tablet 25 mg  25 mg Oral BID  Alejandrina Duggan MD        dextromethorphan-guaiFENesin  mg/5 ml liquid 10 mL  10 mL Oral Q6H PRN Alejandrina Duggan MD        famotidine tablet 20 mg  20 mg Oral Daily Alejandrina Duggan MD        heparin (porcine) injection 5,000 Units  5,000 Units Subcutaneous Q12H Alejandrina Duggan MD        hydrALAZINE tablet 100 mg  100 mg Oral Q8H Alejandrina Duggan MD   100 mg at 04/18/24 0543    isosorbide  mononitrate 24 hr tablet 30 mg  30 mg Oral Daily Alejandrina Duggan MD        melatonin tablet 6 mg  6 mg Oral Nightly PRN Alejandrina Duggan MD        OLANZapine tablet 5 mg  5 mg Oral QHS Alejandrina Duggan MD        ondansetron injection 8 mg  8 mg Intravenous Q6H PRN Alejandrina Duggan MD        oxyCODONE immediate release tablet 15 mg  15 mg Oral Q6H PRN Alejandrina Duggan MD        piperacillin-tazobactam (ZOSYN) 4.5 g in dextrose 5 % in water (D5W) 100 mL IVPB (MB+)  4.5 g Intravenous Q12H Alejandrina Duggan MD        senna-docusate 8.6-50 mg per tablet 2 tablet  2 tablet Oral BID Alejandrina Duggan MD        sevelamer carbonate tablet 1,600 mg  1,600 mg Oral TIDWM Alejandrina Duggan MD   1,600 mg at 04/18/24 0638    simethicone chewable tablet 80 mg  1 tablet Oral TID PRN Alejandrina Duggan MD        traZODone tablet 50 mg  50 mg Oral Nightly PRN Alejandrina Duggan MD        vancomycin - pharmacy to dose   Intravenous pharmacy to manage frequency Alejandrina Duggan MD           Review of patient's allergies indicates:   Allergen Reactions    Iodine Swelling     Causes swelling, itching , and nausea.       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 MD    DIALYSIS FISTULA CREATION Left 06/02/2022    Upper extremity by Ben Hager MD    TUBAL LIGATION       Family History       Problem Relation (Age of  Onset)    Cancer Maternal Aunt    Diabetes Daughter    Hypertension Mother, Father          Tobacco Use    Smoking status: Former     Current packs/day: 1.00     Average packs/day: 1 pack/day for 4.0 years (4.0 ttl pk-yrs)     Types: Cigarettes     Start date: 5/6/2020     Quit date: 5/6/2015    Smokeless tobacco: Never   Substance and Sexual Activity    Alcohol use: Never    Drug use: Never    Sexual activity: Not Currently     Review of Systems   Constitutional: Negative.  Negative for fatigue and unexpected weight change.   HENT: Negative.  Negative for trouble swallowing.    Eyes: Negative.    Respiratory: Negative.  Negative for chest tightness and shortness of breath.    Cardiovascular: Negative.  Negative for chest pain.   Gastrointestinal: Negative.  Negative for abdominal pain, blood in stool and nausea.   Endocrine: Negative.    Genitourinary: Negative.  Negative for hematuria.   Musculoskeletal: Negative.  Negative for back pain and myalgias.   Neurological: Negative.  Negative for dizziness, speech difficulty, weakness and light-headedness.   Psychiatric/Behavioral: Negative.  Negative for agitation and behavioral problems.      Objective:     Vital Signs (Most Recent):  Temp: (!) 102 °F (38.9 °C) (04/18/24 0841)  Pulse: (!) 118 (04/18/24 0803)  Resp: 18 (04/18/24 0700)  BP: 131/85 (04/18/24 0803)  SpO2: (!) 93 % (04/18/24 0803) Vital Signs (24h Range):  Temp:  [98.3 °F (36.8 °C)-102.2 °F (39 °C)] 102 °F (38.9 °C)  Pulse:  [114-127] 118  Resp:  [11-20] 18  SpO2:  [92 %-100 %] 93 %  BP: (108-149)/() 131/85     Weight: 76.7 kg (169 lb)  Body mass index is 24.96 kg/m².     Physical Exam  Constitutional:       General: She is not in acute distress.     Appearance: Normal appearance.   HENT:      Head: Normocephalic.   Cardiovascular:      Rate and Rhythm: Normal rate.   Pulmonary:      Effort: Pulmonary effort is normal. No respiratory distress.   Abdominal:      General: There is no distension.       Tenderness: There is no abdominal tenderness.   Musculoskeletal:         General: Normal range of motion.        Back:         Legs:       Comments: Unstageable sacral decubitus; bilateral lower extremity wounds; see pictures in media   Skin:     General: Skin is warm.      Coloration: Skin is not jaundiced.   Neurological:      General: No focal deficit present.      Mental Status: She is alert and oriented to person, place, and time.      Cranial Nerves: No cranial nerve deficit.            I have reviewed all pertinent lab results within the past 24 hours.  CBC:   Recent Labs   Lab 04/17/24  2245   WBC 8.88   RBC 2.38*   HGB 6.7*   HCT 22.2*      MCV 93.3   MCH 28.2   MCHC 30.2*     BMP:   Recent Labs   Lab 04/18/24  0024   GLU 90      K 4.5      CO2 30   BUN 41*   CREATININE 4.98*   CALCIUM 8.6       Significant Diagnostics:  I have reviewed all pertinent imaging results/findings within the past 24 hours.    Assessment/Plan:     Sacral decubitus ulcer  To the OR for debridement    Risks and benefits explained with the patient including risks for infection, bleeding, injury to surrounding structures, hematoma/seroma formation with need for possible evacuation, possible open.  The patient verbalized understanding, agrees and wishes to proceed with surgery.    Calciphylaxis with nonhealing ulcer of leg  To the OR for debridement    Risks and benefits explained with the patient including risks for infection, bleeding, injury to surrounding structures, hematoma/seroma formation with need for possible evacuation, possible open.  The patient verbalized understanding, agrees and wishes to proceed with surgery.      VTE Risk Mitigation (From admission, onward)           Ordered     heparin (porcine) injection 5,000 Units  Every 12 hours         04/18/24 8291                    Thank you for your consult. I will follow-up with patient. Please contact us if you have any additional questions.    Henok PISANO  Areli, DO  General Surgery  [facility]

## 2024-04-18 NOTE — ED PROVIDER NOTES
Encounter Date: 4/17/2024       History     Chief Complaint   Patient presents with    Fever     33 year old female presents to ED with complaint of bilateral leg pain and fever. Patient states she had a fever on today of 104 at the nursing home. 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 Patient states she was on IV antibiotics in the hospital and [facility]. Patient is a dialysis patient and attends [facility]. Reports constant pain to BLE and reports recent worsening of pain. Denies cough, chest pain, shortness of breath, nausea/vomiting.     The history is provided by the patient and medical records.     Review of patient's allergies indicates:   Allergen Reactions    Iodine Swelling     Causes swelling, itching , and nausea.     Past Medical History:   Diagnosis Date    Anemia in chronic kidney disease     Asthma     Atypical anxiety disorder     Calciphylaxis     severe failed 8 weeks of thiosulfate tx in [place]   see dc summary    Encounter for blood transfusion     multiple    ESRD (end stage renal disease) on dialysis     HD   TTS    GERD (gastroesophageal reflux disease)     Secondary hypertension     SLE (systemic lupus erythematosus)      Past Surgical History:   Procedure Laterality Date    AV FISTULA PLACEMENT Left 03/08/2023    with Banding of left AV fistula: Surgeon Rodrigue Lowery Jr. MD    DIALYSIS FISTULA CREATION Left 06/02/2022    Upper extremity by Ben Hager MD    TUBAL LIGATION       Family History   Problem Relation Name Age of Onset    Hypertension Mother      Hypertension Father      Diabetes Daughter      Cancer Maternal Aunt          breast cancer     Social History     Tobacco Use    Smoking status: Former     Current packs/day: 1.00     Average packs/day: 1 pack/day for 4.0 years (4.0 ttl pk-yrs)     Types: Cigarettes      Start date: 5/6/2020     Quit date: 5/6/2015    Smokeless tobacco: Never   Substance Use Topics    Alcohol use: Never    Drug use: Never     Review of Systems   Constitutional:  Positive for appetite change and fever. Negative for chills.   HENT:  Negative for sinus pressure and sinus pain.    Respiratory:  Negative for cough and shortness of breath.    Cardiovascular:  Negative for chest pain and palpitations.   Gastrointestinal:  Negative for diarrhea and nausea.   Endocrine: Negative for cold intolerance and heat intolerance.   Genitourinary:  Negative for decreased urine volume, dysuria and urgency.   Musculoskeletal:  Positive for arthralgias and myalgias.   Skin:  Positive for wound. Negative for color change.   Allergic/Immunologic: Negative for environmental allergies and food allergies.   Neurological:  Negative for dizziness and weakness.   Hematological:  Negative for adenopathy. Does not bruise/bleed easily.   Psychiatric/Behavioral:  Negative for agitation and confusion.    All other systems reviewed and are negative.      Physical Exam     Initial Vitals [04/17/24 2244]   BP Pulse Resp Temp SpO2   113/77 (!) 114 16 99.4 °F (37.4 °C) 95 %      MAP       --         Physical Exam    Nursing note and vitals reviewed.  Constitutional: She appears well-developed and well-nourished.   HENT:   Head: Normocephalic and atraumatic.   Eyes: EOM are normal. Pupils are equal, round, and reactive to light.   Neck: Neck supple.   Normal range of motion.  Cardiovascular:  Normal rate and regular rhythm.           No murmur heard.  Pulmonary/Chest: She has no wheezes. She has no rhonchi.   Abdominal: She exhibits distension. There is no abdominal tenderness.   Musculoskeletal:         General: Tenderness present. No edema.      Cervical back: Normal range of motion and neck supple.     Lymphadenopathy:     She has no cervical adenopathy.   Neurological: She is alert and oriented to person, place, and time. No cranial  nerve deficit or sensory deficit.   Skin: Skin is warm and dry. Capillary refill takes less than 2 seconds.   Multiple leg wounds; see MEDIA; foul smelling    Psychiatric: She has a normal mood and affect. Thought content normal.                       Medical Screening Exam   See Full Note    ED Course   Procedures  Labs Reviewed   COMPREHENSIVE METABOLIC PANEL - Abnormal; Notable for the following components:       Result Value    BUN 41 (*)     Creatinine 4.98 (*)     Albumin 1.9 (*)     Globulin 5.1 (*)     ALT <6 (*)     AST <3 (*)     eGFR 11 (*)     All other components within normal limits   URINALYSIS, REFLEX TO URINE CULTURE - Abnormal; Notable for the following components:    pH, UA 8.5 (*)     Leukocytes, UA Large (*)     Protein,  (*)     All other components within normal limits   CBC WITH DIFFERENTIAL - Abnormal; Notable for the following components:    RBC 2.38 (*)     Hemoglobin 6.7 (*)     Hematocrit 22.2 (*)     MCHC 30.2 (*)     RDW 19.9 (*)     Lymphocytes % 20.7 (*)     Monocytes % 18.4 (*)     Immature Granulocytes % 1.1 (*)     Monocytes, Absolute 1.63 (*)     Immature Granulocytes, Absolute 0.10 (*)     All other components within normal limits   PT AND PTT - Abnormal; Notable for the following components:    PT 16.8 (*)     PTT 43.7 (*)     All other components within normal limits   URINALYSIS, MICROSCOPIC - Abnormal; Notable for the following components:    WBC, UA 75 (*)     Bacteria, UA Occasional (*)     Squamous Epithelial Cells, UA Few (*)     Trichomonas, UA Occasional (*)     Hyaline Casts, UA 0-2 (*)     All other components within normal limits   INFLUENZA A & B BY MOLECULAR - Normal   LACTIC ACID, PLASMA - Normal   SARS-COV-2 RNA AMPLIFICATION, QUAL - Normal    Narrative:     Negative SARS-CoV results should not be used as the sole basis for treatment or patient management decisions; negative results should be considered in the context of a patient's recent exposures,  history and the presene of clinical signs and symptoms consistent with COVID-19.  Negative results should be treated as presumptive and confirmed by molecular assay, if necessary for patient management.   POCT OCCULT BLOOD (STOOL) - Normal   CULTURE, BLOOD   CULTURE, BLOOD   CULTURE, WOUND   CULTURE, URINE   CBC W/ AUTO DIFFERENTIAL    Narrative:     The following orders were created for panel order CBC auto differential.  Procedure                               Abnormality         Status                     ---------                               -----------         ------                     CBC with Differential[[id number]]       Abnormal            Final result                 Please view results for these tests on the individual orders.   EXTRA TUBES    Narrative:     The following orders were created for panel order EXTRA TUBES.  Procedure                               Abnormality         Status                     ---------                               -----------         ------                     Light Blue Top Hold[[id number]]                             In process                 Light Green Top Hold[[id number]]                            In process                 Light Green Top Hold[[id number]]                            In process                 Lavender Top Hold[[id number]]                               In process                 Gold Top Hold[[id number]]                                   In process                   Please view results for these tests on the individual orders.   LIGHT BLUE TOP HOLD   LIGHT GREEN TOP HOLD   LIGHT GREEN TOP HOLD   LAVENDER TOP HOLD   GOLD TOP HOLD   EXTRA TUBES    Narrative:     The following orders were created for panel order EXTRA TUBES.  Procedure                               Abnormality         Status                     ---------                               -----------         ------                     Light Blue Top Hold[[id number]]                              In process                   Please view results for these tests on the individual orders.   LIGHT BLUE TOP HOLD   SEDIMENTATION RATE, AUTOMATED   TYPE & SCREEN   ABORH RETYPE          Imaging Results              X-Ray Chest AP Portable (In process)                      Medications   acetaminophen tablet 1,000 mg (has no administration in time range)   bisacodyL EC tablet 10 mg (has no administration in time range)   dextromethorphan-guaiFENesin  mg/5 ml liquid 10 mL (has no administration in time range)   melatonin tablet 6 mg (has no administration in time range)   ondansetron injection 8 mg (has no administration in time range)   simethicone chewable tablet 80 mg (has no administration in time range)   traZODone tablet 50 mg (has no administration in time range)   buPROPion tablet 75 mg (has no administration in time range)   carvediloL tablet 25 mg (has no administration in time range)   famotidine tablet 20 mg (has no administration in time range)   hydrALAZINE tablet 100 mg (has no administration in time range)   isosorbide mononitrate 24 hr tablet 30 mg (has no administration in time range)   OLANZapine tablet 5 mg (has no administration in time range)   oxyCODONE immediate release tablet 15 mg (has no administration in time range)   senna-docusate 8.6-50 mg per tablet 2 tablet (has no administration in time range)   sevelamer carbonate tablet 1,600 mg (has no administration in time range)   piperacillin-tazobactam (ZOSYN) 4.5 g in dextrose 5 % in water (D5W) 100 mL IVPB (MB+) (has no administration in time range)   heparin (porcine) injection 5,000 Units (has no administration in time range)   vancomycin (VANCOCIN) 1,250 mg in dextrose 5 % (D5W) 250 mL IVPB (has no administration in time range)   vancomycin - pharmacy to dose (has no administration in time range)   HYDROmorphone (PF) injection 1 mg (1 mg Intravenous Given 4/17/24 6873)   sodium chloride 0.9% bolus 1,000 mL 1,000 mL (0 mLs  Intravenous Stopped 4/18/24 0155)   piperacillin-tazobactam (ZOSYN) 4.5 g in dextrose 5 % in water (D5W) 100 mL IVPB (MB+) (4.5 g Intravenous New Bag 4/18/24 0409)   morphine injection 4 mg (4 mg Intravenous Given 4/18/24 0409)     Medical Decision Making  Amount and/or Complexity of Data Reviewed  Labs: ordered.  Radiology: ordered.    Risk  Prescription drug management.  Decision regarding hospitalization.               ED Course as of 04/18/24 0459   u Apr 18, 2024   0032 Medical decision-making:  Differential diagnosis includes fever, infected decubitus ulcer, UTI, pneumonia, dehydration, sepsis.  All labs and imaging ordered and interpreted by me.   [BB]   0033 CBC shows significant anemia.  For points lower hematocrit than aproximately 2 weeks ago when compared to review of outside medical record.  Elevated white blood count. [BB]   0034 Lactic acid is normal. [BB]   0346 CMP shows elevated creatinine of 4.98 which is around baseline when compared to review of outside medical record.  Flu is negative.  COVID is negative. [BB]   0347 Chest x-ray by my interpretation shows cardiomegaly, no acute infiltrate. [BB]   0353 Stool is Hemoccult negative. [BB]   0353 I made the decision to admit patient and discussed case with internal medicine hospitalist on-call who agrees with admission. [BB]      ED Course User Index  [BB] Jean Paul Redman MD                           Clinical Impression:   Final diagnoses:  [R50.9] Fever  [T14.8XXA, L08.9] Wound infection (Primary)  [L89.90, L08.9] Infected decubitus ulcer, unspecified ulcer stage  [L89.159] Sacral decubitus ulcer        ED Disposition Condition    Admit                 Jean Paul Redman MD  04/18/24 0451

## 2024-04-18 NOTE — SUBJECTIVE & OBJECTIVE
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 MD    DIALYSIS FISTULA CREATION Left 06/02/2022    Upper extremity by Ben Hager MD    TUBAL LIGATION         Review of patient's allergies indicates:   Allergen Reactions    Iodine Swelling     Causes swelling, itching , and nausea.       Current Facility-Administered Medications   Medication Dose Route Frequency Provider Last Rate Last Admin    acetaminophen tablet 1,000 mg  1,000 mg Oral Q6H PRN Alejandrina Duggan MD        bisacodyL EC tablet 10 mg  10 mg Oral Daily PRN Alejandrina Duggan MD        buPROPion tablet 75 mg  75 mg Oral BID Alejandrina Duggan MD        carvediloL tablet 25 mg  25 mg Oral BID WM Alejandrina Duggan MD        dextromethorphan-guaiFENesin  mg/5 ml liquid 10 mL  10 mL Oral Q6H PRN Alejandrina Duggan MD        famotidine tablet 20 mg  20 mg Oral Daily Alejandrina Duggan MD        heparin (porcine) injection 5,000 Units  5,000 Units Subcutaneous Q12H Alejandrina Duggan MD        hydrALAZINE tablet 100 mg  100 mg Oral Q8H Alejandrina Duggan MD        isosorbide mononitrate 24 hr tablet 30 mg  30 mg Oral Daily Alejandrina Duggan MD        melatonin tablet 6 mg  6 mg Oral Nightly PRN Alejandrina Duggan MD        OLANZapine tablet 5 mg  5 mg Oral QHS Alejandrina Duggan MD        ondansetron injection 8 mg  8 mg Intravenous Q6H PRN  Alejandrina Duggan MD        oxyCODONE immediate release tablet 15 mg  15 mg Oral Q6H PRN Alejandrina Duggan MD        piperacillin-tazobactam (ZOSYN) 4.5 g in dextrose 5 % in water (D5W) 100 mL IVPB (MB+)  4.5 g Intravenous Q12H Alejandrina Duggan MD        senna-docusate 8.6-50 mg per tablet 2 tablet  2 tablet Oral BID Alejandrina Duggan MD        sevelamer carbonate tablet 1,600 mg  1,600 mg Oral TIDWM Alejandrina Duggan MD        simethicone chewable tablet 80 mg  1 tablet Oral TID PRN Alejandrina Duggan MD        traZODone tablet 50 mg  50 mg Oral Nightly PRN Alejandrina Duggan MD        vancomycin (VANCOCIN) 1,250 mg in dextrose 5 % (D5W) 250 mL IVPB  1,250 mg Intravenous Once Alejandrina Duggan MD        vancomycin - pharmacy to dose   Intravenous pharmacy to manage frequency Alejandrina Duggan MD         Family History       Problem Relation (Age of Onset)    Cancer Maternal Aunt    Diabetes Daughter    Hypertension Mother, Father          Tobacco Use    Smoking status: Former     Current packs/day: 1.00     Average packs/day: 1 pack/day for 4.0 years (4.0 ttl pk-yrs)     Types: Cigarettes     Start date: 5/6/2020     Quit date: 5/6/2015    Smokeless tobacco: Never   Substance and Sexual Activity    Alcohol use: Never    Drug use: Never    Sexual activity: Not Currently     Review of Systems   Constitutional:  Negative for appetite change, chills, fatigue, fever and unexpected weight change.   HENT:  Negative for congestion, mouth sores, nosebleeds, sinus pain, sore throat and trouble swallowing.    Respiratory:  Negative for apnea, cough, chest tightness and shortness of breath.    Cardiovascular:  Negative for chest pain, palpitations and leg swelling.   Gastrointestinal:  Negative for abdominal pain, blood in stool, constipation, diarrhea, nausea and vomiting.   Genitourinary:  Negative for decreased urine volume, difficulty urinating, dysuria and frequency.   Musculoskeletal:  Positive  for arthralgias and back pain. Negative for neck pain.   Skin:  Positive for wound. Negative for rash.   Neurological:  Negative for syncope, light-headedness and headaches.   Hematological:  Does not bruise/bleed easily.   Psychiatric/Behavioral:  Negative for agitation, confusion and suicidal ideas.      Objective:     Vital Signs (Most Recent):  Temp: 98.3 °F (36.8 °C) (04/18/24 0445)  Pulse: (!) 127 (04/18/24 0445)  Resp: 20 (04/18/24 0445)  BP: (!) 149/101 (04/18/24 0445)  SpO2: 95 % (04/18/24 0445) Vital Signs (24h Range):  Temp:  [98.3 °F (36.8 °C)-99.4 °F (37.4 °C)] 98.3 °F (36.8 °C)  Pulse:  [114-127] 127  Resp:  [11-20] 20  SpO2:  [92 %-100 %] 95 %  BP: (108-149)/() 149/101     Weight: 76.7 kg (169 lb)  Body mass index is 24.96 kg/m².     Physical Exam  Vitals and nursing note reviewed. Exam conducted with a chaperone present.   Constitutional:       General: She is not in acute distress.     Appearance: She is ill-appearing. She is not toxic-appearing or diaphoretic.   HENT:      Head: Atraumatic.      Mouth/Throat:      Mouth: Mucous membranes are moist.      Pharynx: Oropharynx is clear.   Eyes:      Conjunctiva/sclera: Conjunctivae normal.      Pupils: Pupils are equal, round, and reactive to light.   Neck:      Vascular: No carotid bruit.   Cardiovascular:      Rate and Rhythm: Regular rhythm. Tachycardia present.      Pulses: Normal pulses.      Heart sounds: Normal heart sounds.      Comments: Bruit auscultated/thrill palpated LUE  Pulmonary:      Effort: Pulmonary effort is normal.      Breath sounds: Normal breath sounds.   Abdominal:      General: Bowel sounds are normal. There is distension.      Palpations: There is shifting dullness and fluid wave.      Tenderness: There is no abdominal tenderness. There is no right CVA tenderness, left CVA tenderness, guarding or rebound.   Musculoskeletal:         General: Deformity present. No tenderness or signs of injury. Normal range of motion.       Cervical back: Neck supple.      Right lower leg: Edema present.      Left lower leg: Edema present.   Skin:     General: Skin is warm and dry.      Capillary Refill: Capillary refill takes less than 2 seconds.      Coloration: Skin is not jaundiced or pale.      Findings: No bruising, lesion or rash.   Neurological:      General: No focal deficit present.      Mental Status: She is alert and oriented to person, place, and time.   Psychiatric:         Mood and Affect: Mood normal. Affect is blunt and flat.              CRANIAL NERVES     CN III, IV, VI   Pupils are equal, round, and reactive to light.       Significant Labs: All pertinent labs within the past 24 hours have been reviewed.    Significant Imaging: I have reviewed all pertinent imaging results/findings within the past 24 hours.

## 2024-04-18 NOTE — ASSESSMENT & PLAN NOTE
Not currently on any biologics.  Has been tried on previous medications.  Has followed with a rheumatologist in [place].

## 2024-04-18 NOTE — TRANSFER OF CARE
"Anesthesia Transfer of Care Note    Patient: Madhav Correa    Procedure(s) Performed: Procedure(s) (LRB):  DEBRIDEMENT, WOUND, SACRUM (N/A)  DEBRIDEMENT, LOWER EXTREMITY (Bilateral)    Patient location: PACU    Anesthesia Type: general    Transport from OR: Transported from OR on 100% O2 by closed face mask with adequate spontaneous ventilation    Post pain: adequate analgesia    Post assessment: no apparent anesthetic complications    Post vital signs: stable    Level of consciousness: responds to stimulation    Nausea/Vomiting: no nausea/vomiting    Complications: none    Transfer of care protocol was followed      Last vitals: Visit Vitals  BP 97/64   Pulse (!) 114   Temp 37.8 °C (100 °F) (Oral)   Resp (!) 22   Ht 5' 9" (1.753 m)   Wt 76.7 kg (169 lb)   LMP 04/10/2024   SpO2 99%   Breastfeeding No   BMI 24.96 kg/m²     "

## 2024-04-18 NOTE — HPI
Chief complaint:  ESRD    History of present illness:  Ms. Correa is a 33-year-old  lady with end-stage renal disease who was transferred to a nursing home [facility] for rehabilitation.  She normally dialyzes in [facility] under the [facility].  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  The patient has received sodium thiosulfate while hospitalized in the [facility] for about 8 weeks.  This apparently was stopped because it did not seem to be improving her calciphylaxis wounds.  The has no history of warfarin use.  She denies any trauma to her legs.  She states the wounds have been present about 3 months.  Patient has been on dialysis for about 4 years.

## 2024-04-18 NOTE — ASSESSMENT & PLAN NOTE
Patient's anemia is currently uncontrolled. Has not received any PRBCs to date. Etiology likely d/t chronic disease due to kwa shiorkor and chronic inflammation from wounds.    Current CBC reviewed-   Lab Results   Component Value Date    HGB 6.7 (L) 04/17/2024    HCT 22.2 (L) 04/17/2024     Monitor serial CBC and transfuse if patient becomes hemodynamically unstable, symptomatic or H/H drops below 7/21.    Patient has been T&S and can be transfused today on HD.   She has had multiple transfusion and I will add anemia panel to her previous labs.

## 2024-04-18 NOTE — HPI
33-year-old  female admitted to the hospital for nonhealing wounds and unstageable sacral decubitus to backside in bilateral lower extremities.  History of lupus, end-stage renal disease on hemodialysis, severe calciphylaxis; coming in from nursing home.  Patient on IV antibiotics.  General surgery consulted for unstageable sacral decubitus with necrosis/eschar and bilateral lower extremity wounds.  Denies any chest pain/shortness of breath, nausea/vomiting/diarrhea, fever/chills.

## 2024-04-18 NOTE — HPI
34 yo F presents to [facility] ED from [facility] for worsening wounds.  Patient is severely debilitated due to SLE.  She was diagnosed five years ago and has suffered secondary hypertension and  ESRD and is now on HD TTS.  She has also developed severe calciphylaxis during her daily dialysis when she was hospitalized at [facility].  Patient lives in [place] and was transferred for wound care to [facility] because they were one of few nursing homes that would accept HD patients.      This patient has a tragic story and I have read the dc summary from 4/424 regarding the eight week course of thiosulfate and the calciphylaxis becoming worse.  She has developed severe wounds on her legs and buttocks (see photos).  She was on meropenum and levaquin in hospital for MSSA and pseudomonas.  She was discharged to the nursing home with wound care and levaquin.  Patient has worsening wounds and the nursing home sent her to [facility] ED for further evaluation and admission.  Prior to transfer from [facility], the discharging physician and the nephrologist and ID physician spoke to her about considering hospice/palliative care.  Patient was adamant about being a full code.  She has two children (12 yo and 12 yo).  She wants to raise her children.      She states that she is able to stand and walk some with assistance.  She does move her upper extremities and feeds herself but has generalized weakness of all extremities but says she is not familiar with the term transverse myelitis and is not sure why her extremities so weak (3/5 of all four - able to lift gravity).  She has some atrophy noted but most of her leg problems may be from being bedridden with these wounds.  She has been on steroids previously for lupus flares but not chronically.

## 2024-04-18 NOTE — H&P
[facility] - [address] [place] [address] Medicine  History & Physical    Patient Name: Madhav Correa  MRN: [id number]  Patient Class: IP- Inpatient  Admission Date: 4/17/2024  Attending Physician: Elvis Ndiaye DO   Primary Care Provider: [facility]         Patient information was obtained from patient, past medical records, and ER records.     Subjective:     Principal Problem:<principal problem not specified>    Chief Complaint:   Chief Complaint   Patient presents with    Fever        HPI: 32 yo F presents to [facility] ED from [facility] for worsening wounds.  Patient is severely debilitated due to SLE.  She was diagnosed five years ago and has suffered secondary hypertension and  ESRD and is now on HD TTS.  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  Patient lives in [place] and was transferred for wound care to [place] because they were one of few nursing [facility] that would accept HD patients.      This patient has a tragic story and I have read the dc summary from 4/424 regarding the eight week course of thiosulfate and the calciphylaxis becoming worse.  She has developed severe wounds on her legs and buttocks (see photos).  She was on meropenum and levaquin in hospital for MSSA and pseudomonas.  She was discharged to the nursing home with wound care and levaquin.  Patient has worsening wounds and the nursing home sent her to [facility] ED for further evaluation and admission.  Prior to transfer from [facility], the discharging physician and the nephrologist and ID physician spoke to her about considering hospice/palliative care.  Patient was adamant about being a full code.  She has two children (12 yo and 12 yo).  She wants to raise her children.      She states that she is able to stand and walk some with assistance.  She does move her upper extremities and feeds herself but has  generalized weakness of all extremities but says she is not familiar with the term transverse myelitis and is not sure why her extremities so weak (3/5 of all four - able to lift gravity).  She has some atrophy noted but most of her leg problems may be from being bedridden with these wounds.  She has been on steroids previously for lupus flares but not chronically.       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 MD    DIALYSIS FISTULA CREATION Left 06/02/2022    Upper extremity by Ben Hager MD    TUBAL LIGATION         Review of patient's allergies indicates:   Allergen Reactions    Iodine Swelling     Causes swelling, itching , and nausea.       Current Facility-Administered Medications   Medication Dose Route Frequency Provider Last Rate Last Admin    acetaminophen tablet 1,000 mg  1,000 mg Oral Q6H PRN Alejandrina Duggan MD        bisacodyL EC tablet 10 mg  10 mg Oral Daily PRN Alejandrina Duggan MD        buPROPion tablet 75 mg  75 mg Oral BID Alejandrina Duggan MD        carvediloL tablet 25 mg  25 mg Oral BID WM Alejandrina Duggan MD        dextromethorphan-guaiFENesin  mg/5 ml liquid 10 mL  10 mL Oral Q6H PRN Alejandrina Duggan MD        famotidine tablet 20 mg  20 mg Oral Daily Alejandrina Duggan MD        heparin (porcine) injection 5,000 Units  5,000 Units Subcutaneous Q12H Alejandrina Duggan MD         hydrALAZINE tablet 100 mg  100 mg Oral Q8H Alejandrina Duggan MD        isosorbide mononitrate 24 hr tablet 30 mg  30 mg Oral Daily Alejandrina Duggan MD        melatonin tablet 6 mg  6 mg Oral Nightly PRN Alejandrina Duggan MD        OLANZapine tablet 5 mg  5 mg Oral QHS Alejandrina Duggan MD        ondansetron injection 8 mg  8 mg Intravenous Q6H PRN Alejandrina Duggan MD        oxyCODONE immediate release tablet 15 mg  15 mg Oral Q6H PRN Alejandrina Duggan MD        piperacillin-tazobactam (ZOSYN) 4.5 g in dextrose 5 % in water (D5W) 100 mL IVPB (MB+)  4.5 g Intravenous Q12H Alejandrina Duggan MD        senna-docusate 8.6-50 mg per tablet 2 tablet  2 tablet Oral BID Alejanrdina Duggan MD        sevelamer carbonate tablet 1,600 mg  1,600 mg Oral TIDWM Alejandrina Duggan MD        simethicone chewable tablet 80 mg  1 tablet Oral TID PRN Alejandrina Duggan MD        traZODone tablet 50 mg  50 mg Oral Nightly PRN Alejandrina Duggan MD        vancomycin (VANCOCIN) 1,250 mg in dextrose 5 % (D5W) 250 mL IVPB  1,250 mg Intravenous Once Alejandrina Duggan MD        vancomycin - pharmacy to dose   Intravenous pharmacy to manage frequency Alejandrina Duggan MD         Family History       Problem Relation (Age of Onset)    Cancer Maternal Aunt    Diabetes Daughter    Hypertension Mother, Father          Tobacco Use    Smoking status: Former     Current packs/day: 1.00     Average packs/day: 1 pack/day for 4.0 years (4.0 ttl pk-yrs)     Types: Cigarettes     Start date: 5/6/2020     Quit date: 5/6/2015    Smokeless tobacco: Never   Substance and Sexual Activity    Alcohol use: Never    Drug use: Never    Sexual activity: Not Currently     Review of Systems   Constitutional:  Negative for appetite change, chills, fatigue, fever and unexpected weight change.   HENT:  Negative for congestion, mouth sores, nosebleeds, sinus pain, sore throat and trouble swallowing.    Respiratory:  Negative for  apnea, cough, chest tightness and shortness of breath.    Cardiovascular:  Negative for chest pain, palpitations and leg swelling.   Gastrointestinal:  Negative for abdominal pain, blood in stool, constipation, diarrhea, nausea and vomiting.   Genitourinary:  Negative for decreased urine volume, difficulty urinating, dysuria and frequency.   Musculoskeletal:  Positive for arthralgias and back pain. Negative for neck pain.   Skin:  Positive for wound. Negative for rash.   Neurological:  Negative for syncope, light-headedness and headaches.   Hematological:  Does not bruise/bleed easily.   Psychiatric/Behavioral:  Negative for agitation, confusion and suicidal ideas.      Objective:     Vital Signs (Most Recent):  Temp: 98.3 °F (36.8 °C) (04/18/24 0445)  Pulse: (!) 127 (04/18/24 0445)  Resp: 20 (04/18/24 0445)  BP: (!) 149/101 (04/18/24 0445)  SpO2: 95 % (04/18/24 0445) Vital Signs (24h Range):  Temp:  [98.3 °F (36.8 °C)-99.4 °F (37.4 °C)] 98.3 °F (36.8 °C)  Pulse:  [114-127] 127  Resp:  [11-20] 20  SpO2:  [92 %-100 %] 95 %  BP: (108-149)/() 149/101     Weight: 76.7 kg (169 lb)  Body mass index is 24.96 kg/m².     Physical Exam  Vitals and nursing note reviewed. Exam conducted with a chaperone present.   Constitutional:       General: She is not in acute distress.     Appearance: She is ill-appearing. She is not toxic-appearing or diaphoretic.   HENT:      Head: Atraumatic.      Mouth/Throat:      Mouth: Mucous membranes are moist.      Pharynx: Oropharynx is clear.   Eyes:      Conjunctiva/sclera: Conjunctivae normal.      Pupils: Pupils are equal, round, and reactive to light.   Neck:      Vascular: No carotid bruit.   Cardiovascular:      Rate and Rhythm: Regular rhythm. Tachycardia present.      Pulses: Normal pulses.      Heart sounds: Normal heart sounds.      Comments: Bruit auscultated/thrill palpated LUE  Pulmonary:      Effort: Pulmonary effort is normal.      Breath sounds: Normal breath sounds.    Abdominal:      General: Bowel sounds are normal. There is distension.      Palpations: There is shifting dullness and fluid wave.      Tenderness: There is no abdominal tenderness. There is no right CVA tenderness, left CVA tenderness, guarding or rebound.   Musculoskeletal:         General: Deformity present. No tenderness or signs of injury. Normal range of motion.      Cervical back: Neck supple.      Right lower leg: Edema present.      Left lower leg: Edema present.   Skin:     General: Skin is warm and dry.      Capillary Refill: Capillary refill takes less than 2 seconds.      Coloration: Skin is not jaundiced or pale.      Findings: No bruising, lesion or rash.   Neurological:      General: No focal deficit present.      Mental Status: She is alert and oriented to person, place, and time.   Psychiatric:         Mood and Affect: Mood normal. Affect is blunt and flat.              CRANIAL NERVES     CN III, IV, VI   Pupils are equal, round, and reactive to light.       Significant Labs: All pertinent labs within the past 24 hours have been reviewed.    Significant Imaging: I have reviewed all pertinent imaging results/findings within the past 24 hours.  Assessment/Plan:     Calciphylaxis with nonhealing ulcer of leg  Surgery has been consulted though not sure this is a surgical problem  Broad spectrum IV abx started and cultures are pending.  Wound care team also consulted,.  Perhaps nephrology could offer some recommendations since they may see this more often in HD.        Sacral decubitus ulcer  Previous cultures were pseudomonas and MSSA and treated with meropenum and levaquin.  Patient given zosyn in ED after cultures drawn.  I have added vancomycin.  Current lactic acid is normal and other than tachycardia (which may be related to her anemia) she does not meet sepsis criteria.        Anemia in chronic kidney disease  Patient's anemia is currently uncontrolled. Has not received any PRBCs to date.  Etiology likely d/t chronic disease due to kwa shiorkor and chronic inflammation from wounds.    Current CBC reviewed-   Lab Results   Component Value Date    HGB 6.7 (L) 04/17/2024    HCT 22.2 (L) 04/17/2024     Monitor serial CBC and transfuse if patient becomes hemodynamically unstable, symptomatic or H/H drops below 7/21.    Patient has been T&S and can be transfused today on HD.   She has had multiple transfusion and I will add anemia panel to her previous labs.      Kwashiorkor  Nutrition consulted. Most recent weight and BMI monitored-     Measurements:  Wt Readings from Last 1 Encounters:   04/17/24 76.7 kg (169 lb)   Body mass index is 24.96 kg/m².    Nutrition consult and PAB ordered.    Patient has been screened and assessed by RD.    Malnutrition Type:  Context:    Level:      Malnutrition Characteristic Summary:       Interventions/Recommendations (treatment strategy):         Secondary hypertension  Continue her home antihypertensive medications.        SLE (systemic lupus erythematosus)  Not currently on any biologics.  Has been tried on previous medications.  Has followed with a rheumatologist in [place].        ESRD (end stage renal disease) on dialysis  Creatine stable for now. BMP reviewed- noted Estimated Creatinine Clearance: 16.8 mL/min (A) (based on SCr of 4.98 mg/dL (H)). according to latest data. Based on current GFR, CKD stage is stage 5 - GFR < 15.  Monitor UOP and serial BMP and adjust therapy as needed. Renally dose meds. Avoid nephrotoxic medications and procedures.    HD TTF    Atypical anxiety disorder  Continue her home buspar and nighttime zyprexa.  Patient appears depressed with flat affect but she assures me she is not and is hopeful that we can help her and for her future.  However, she has been told of her dismal prognosis.  Will not consult psychiatry at this time.  Patient states her condition is controlled on current psychotropics.        GERD (gastroesophageal reflux  disease)  Continue OP PPI.        Asthma  PRN bronchodilator        VTE Risk Mitigation (From admission, onward)           Ordered     heparin (porcine) injection 5,000 Units  Every 12 hours         04/18/24 7172                               Pharmacy consulted to assist with the management of vancomycin in this nursing home patient to treat: multiple wounds including stage 4 sacral wound    Patient weight: 76.7 kg  Patient CrCl: ~18 ml/min    While H&P is pending, will give a one time dose of vancomycin 1250mg and follow up once more information is available.    Pharmacy will continue to monitor and make adjustments as needed.      Thank you for the consult,    Kenia Trejo, PharmD  [phone]    Alejandrina Duggan MD  Department of Hospital Medicine  [facility] - [address] [place] [address]

## 2024-04-18 NOTE — ASSESSMENT & PLAN NOTE
Creatine stable for now. BMP reviewed- noted Estimated Creatinine Clearance: 16.8 mL/min (A) (based on SCr of 4.98 mg/dL (H)). according to latest data. Based on current GFR, CKD stage is stage 5 - GFR < 15.  Monitor UOP and serial BMP and adjust therapy as needed. Renally dose meds. Avoid nephrotoxic medications and procedures.    HD TTF

## 2024-04-18 NOTE — CONSULTS
[facility] - Periop Services  Adult Nutrition  Consult Note         Reason for Assessment  Reason For Assessment: consult for protein malnutrition  Nutrition Risk Screen: large or nonhealing wound, burn or pressure injury    Assessment and Plan  Consult received and appreciated. Patient admitted 4/17 with a dx of SLE, sacral decubitus ulcer s/p debridement, calciphylaxis with nonhealing ulcer of leg, ESRD on HD, and sepsis.     Patient is currently NPO s/p debridement. Patient is 169 pounds with a BMI of 24.96. Chart review shows historical weight of 222 pounds in Jan 2024. Noted dx anasarca in February 2024. Fluid and ESRD likely contributing to weight changes.      Patient currently off floor for procedure. Will complete patient interview and NFPE on follow up. Malnutrition suspected due to chronic illness (SLE, ESRD on HD) and multiple wounds.    Recommend to advance diet as medically appropriate to Renal High protein. Recommend to add Novasource Renal all meals and Keegan BID when diet advanced. RD Following.             Learning Needs/Social Determinants of Health  Learning Assessment       04/18/2024 0603 [facility] - Periop Services (4/17/2024 - Present)   Created by Cathy Mcintosh, RN - RN (Nurse) Status: Complete                 PRIMARY LEARNER     Primary Learner Name:  Madhav Correa  - 04/18/2024 0603    Relationship:  Patient  - 04/18/2024 0603    Does the primary learner have any barriers to learning?:  No Barriers  - 04/18/2024 0603    What is the preferred language of the primary learner?:  English  - 04/18/2024 0603    Is an  required?:  No  - 04/18/2024 0603    How does the primary learner prefer to learn new concepts?:  Listening  - 04/18/2024 0603    How often do you need to have someone help you read instructions, pamphlets, or written material from your doctor or pharmacy?:  Sometimes  - 04/18/2024 0603        CO-LEARNER #1     No question answered         CO-LEARNER #2     No question answered        SPECIAL TOPICS     No question answered        ANSWERED BY:     No question answered        Comments         Edit History       Cathy Mcintosh, RN - RN (Nurse)   04/18/2024 0603                          Social Determinants of Health     Tobacco Use: Medium Risk (4/18/2024)    Patient History     Smoking Tobacco Use: Former     Smokeless Tobacco Use: Never     Passive Exposure: Not on file   Alcohol Use: Not At Risk (4/18/2024)    AUDIT-C     Frequency of Alcohol Consumption: Never     Average Number of Drinks: Patient does not drink     Frequency of Binge Drinking: Never   Financial Resource Strain: Low Risk  (4/18/2024)    Overall Financial Resource Strain (CARDIA)     Difficulty of Paying Living Expenses: Not hard at all   Food Insecurity: No Food Insecurity (4/18/2024)    Hunger Vital Sign     Worried About Running Out of Food in the Last Year: Never true     Ran Out of Food in the Last Year: Never true   Transportation Needs: No Transportation Needs (4/18/2024)    PRAPARE - Transportation     Lack of Transportation (Medical): No     Lack of Transportation (Non-Medical): No   Physical Activity: Inactive (4/18/2024)    Exercise Vital Sign     Days of Exercise per Week: 0 days     Minutes of Exercise per Session: 0 min   Stress: No Stress Concern Present (4/18/2024)    [employer] [place] [employer] - Occupational Stress Questionnaire     Feeling of Stress : Only a little   Recent Concern: Stress - Stress Concern Present (2/4/2024)    Received from [facility] [place] [facility] [employer] - Occupational Stress Questionnaire     Feeling of Stress : Rather much   Social Connections: Moderately Isolated (4/18/2024)    Social Connection and Isolation Panel [NHANES]     Frequency of Communication with Friends and Family: More than three times a week     Frequency of Social Gatherings with Friends and  Family: More than three times a week     Attends Baptism Services: More than 4 times per year     Active Member of Clubs or Organizations: No     Attends Club or Organization Meetings: Never     Marital Status: Never    Housing Stability: Low Risk  (4/18/2024)    Housing Stability Vital Sign     Unable to Pay for Housing in the Last Year: No     Number of Times Moved in the Last Year: 0     Homeless in the Last Year: No   Depression: Moderate depression (2/4/2024)    [facility]    PHQ-9     Patient Health Questionnaire-9 Score: 10            Malnutrition  Suspected; NFPE will be completed on follow up; patient unavailable at time of initial assessment    Nutrition Diagnosis  Increased Protein Needs related to Wound healing as evidenced by multiple wounds to lower extremities and sacral area  Comments: recommend novasource renal all meals/Keegan BID when diet advanced    Recent Labs   Lab 04/18/24  0024   GLU 90     Comments on Glucose: WNL     Nutrition Prescription / Recommendations  Recommendation/Intervention: recommend to advance diet as medically appropriate; add Keegan BID  Goals: diet advancement with po intake 75%; weight maintenance during admission  Nutrition Goal Status: new  Current Diet Order: NPO  Oral Nutrition Supplement: recommend Keegan BID when diet resumed  Chewing or Swallowing Difficulty?: no issues noted per flowsheets  Recommended Diet: Regular  Recommended Oral Supplement: Keegan [90 kcals, 2.5g Protein, 10g Carbs(3g Sugar), 7g L-Arginine, 7g L-Glutamine, Vitamin C 300mg, 9.5mg Zinc] 2 times a day  Is Nutrition Support Recommended: [facility] Support: No  Is Nutrition Education Recommended: No    Monitor and Evaluation  % current Intake: NPO  % intake to meet estimated needs: 75 - 100 %  Food and Nutrient Intake: energy intake, food and beverage intake  Food and Nutrient Adminstration: diet order  Anthropometric  "Measurements: height/length, weight, weight change, body mass index  Biochemical Data, Medical Tests and Procedures: electrolyte and renal panel, lipid profile, gastrointestinal profile, glucose/endocrine profile, inflammatory profile       Current Medical Diagnosis and Past Medical History     Past Medical History:   Diagnosis Date    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     Anemia in chronic kidney disease     Asthma     Atypical anxiety disorder     Calciphylaxis     severe failed 8 weeks of thiosulfate tx in [place]   see dc summary    Encounter for blood transfusion     multiple    ESRD (end stage renal disease) on dialysis     HD   TTS    GERD (gastroesophageal reflux disease)     Kwashiorkor     Secondary hypertension     SLE (systemic lupus erythematosus)        Nutrition/Diet History  Spiritual, Cultural Beliefs, Orthodoxy Practices, Values that Affect Care: no  Food Allergies: NKFA  Factors Affecting Nutritional Intake: NPO    Lab/Procedures/Meds  Recent Labs   Lab 04/18/24  0024      K 4.5   BUN 41*   CREATININE 4.98*   CALCIUM 8.6   ALBUMIN 1.9*      ALT <6*   AST <3*   BUN/Cr elevated; Hx CKD on HD  Alb low; sacral wound with sepsis dx  Last A1c: No results found for: "HGBA1C"  Lab Results   Component Value Date    RBC 2.38 (L) 04/17/2024    HGB 6.7 (L) 04/17/2024    HCT 22.2 (L) 04/17/2024    MCV 93.3 04/17/2024    MCH 28.2 04/17/2024    MCHC 30.2 (L) 04/17/2024     Pertinent Labs Reviewed: reviewed  Pertinent Medications Reviewed: reviewed  Scheduled Meds:  Current Facility-Administered Medications   Medication Dose Route Frequency    buPROPion  75 mg Oral BID    carvediloL  25 mg Oral BID WM    famotidine  20 mg Oral Daily    heparin (porcine)  5,000 Units Subcutaneous Q12H    hydrALAZINE  100 mg Oral Q8H    isosorbide mononitrate  30 mg Oral Daily    OLANZapine  5 mg Oral QHS    piperacillin-tazobactam (Zosyn) IV (PEDS and ADULTS) (extended infusion is not appropriate)  4.5 g Intravenous Q12H " "   senna-docusate 8.6-50 mg  2 tablet Oral BID    sevelamer carbonate  1,600 mg Oral TIDWM     Continuous Infusions:  Current Facility-Administered Medications   Medication Dose Route Frequency Last Rate Last Admin    lactated ringers  125 mL/hr Intravenous Continuous         PRN Meds:.  Current Facility-Administered Medications:     acetaminophen, 1,000 mg, Oral, Q6H PRN    bisacodyL, 10 mg, Oral, Daily PRN    dextromethorphan-guaiFENesin  mg/5 ml, 10 mL, Oral, Q6H PRN    diphenhydrAMINE, 25 mg, Intravenous, Q6H PRN    HYDROmorphone, 0.5 mg, Intravenous, Q5 Min PRN    melatonin, 6 mg, Oral, Nightly PRN    meperidine, 25 mg, Intravenous, Q10 Min PRN    morphine, 4 mg, Intravenous, Q5 Min PRN    ondansetron, 4 mg, Intravenous, Daily PRN    ondansetron, 8 mg, Intravenous, Q6H PRN    oxyCODONE, 15 mg, Oral, Q6H PRN    oxyCODONE, 5 mg, Oral, Q3H PRN    simethicone, 1 tablet, Oral, TID PRN    traZODone, 50 mg, Oral, Nightly PRN    vancomycin - pharmacy to dose, , Intravenous, pharmacy to manage frequency    Anthropometrics  Temp: 100 °F (37.8 °C)  Height Method: Stated  Height: 5' 9" (175.3 cm)  Height (inches): 69 in  Weight Method: Bed Scale  Weight: 76.7 kg (169 lb)  Weight (lb): 169 lb  Ideal Body Weight (IBW), Female: 145 lb  % Ideal Body Weight, Female (lb): 116.55 %  BMI (Calculated): 24.9       Estimated/Assessed Needs      Temp: 100 °F (37.8 °C)Oral  Weight Used For Calorie Calculations: 76.7 kg (169 lb 1.5 oz)   Energy Need Method: Kcal/kg    Weight Used For Protein Calculations: 76.7 kg (169 lb 1.5 oz)  Protein Requirements:   Estimated Fluid Requirement Method: RDA Method            Nutrition by Nursing  Diet/Nutrition Received: NPO           Last Bowel Movement: 04/17/24                Nutrition Follow-Up  RD Follow-up?: Yes      Nutrition Discharge Planning: Patient is NH resident and will return; recommend liberalized diet with Keegan BID            Available via Secure Chat  "

## 2024-04-18 NOTE — CONSULTS
[facility]  Nephrology  Consult Note    Patient Name: Madhav Correa  MRN: [id number]  Admission Date: 4/17/2024  Hospital Length of Stay: 0 days  Attending Provider: Elvis Ndiaye DO   Primary Care Physician: Latoya, [facility]  Principal Problem:ESRD (end stage renal disease) on dialysis    Consults  Subjective:     HPI: Chief complaint:  ESRD    History of present illness:  Ms. Correa is a 33-year-old  lady with end-stage renal disease who was transferred to a nursing home [facility] for rehabilitation.  She normally dialyzes in [facility] under the [facility].  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  The patient has received sodium thiosulfate while hospitalized in the [facility] for about 8 weeks.  This apparently was stopped because it did not seem to be improving her calciphylaxis wounds.  The has no history of warfarin use.  She denies any trauma to her legs.  She states the wounds have been present about 3 months.  Patient has been on dialysis for about 4 years.    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 MD    DIALYSIS FISTULA CREATION Left 06/02/2022    Upper extremity by Ben Hager MD    TUBAL LIGATION          Review of patient's allergies indicates:   Allergen Reactions    Iodine Swelling     Causes swelling, itching , and nausea.     Current Facility-Administered Medications   Medication Dose Route Frequency Provider Last Rate Last Admin    0.9%  NaCl infusion   Intravenous PRN Elvis Ndiaye DO        acetaminophen tablet 1,000 mg  1,000 mg Oral Q6H PRN Alejandrina Duggan MD   1,000 mg at 04/18/24 0841    bisacodyL EC tablet 10 mg  10 mg Oral Daily PRN Alejandrina Duggan MD        buPROPion tablet 75 mg  75 mg Oral BID Alejandrina Duggan MD        carvediloL tablet 25 mg  25 mg Oral BID WM Alejandrina Duggan MD        dextromethorphan-guaiFENesin  mg/5 ml liquid 10 mL  10 mL Oral Q6H PRN Alejandrina Duggan MD        famotidine tablet 20 mg  20 mg Oral Daily Alejandrina Duggan MD        heparin (porcine) injection 5,000 Units  5,000 Units Subcutaneous Q12H Alejandrina Duggan MD        hydrALAZINE tablet 100 mg  100 mg Oral Q8H Alejandrina Duggan MD   100 mg at 04/18/24 0543    isosorbide mononitrate 24 hr tablet 30 mg  30 mg Oral Daily Alejandrina Duggan MD        melatonin tablet 6 mg  6 mg Oral Nightly PRN Alejandrina Duggan MD        mupirocin 2 % ointment   Nasal BID Elvis Ndiaye DO        OLANZapine tablet 5 mg  5 mg Oral QHS Alejandrina Duggan MD        ondansetron injection 8 mg  8 mg Intravenous Q6H PRN Alejandrina Duggan MD        oxyCODONE immediate release tablet 15 mg  15 mg Oral Q6H PRN Alejandrina Duggan MD        piperacillin-tazobactam (ZOSYN) 4.5 g in dextrose 5 % in water (D5W) 100 mL IVPB (MB+)  4.5 g Intravenous Q12H Alejandrina Duggan MD   4.5 g at 04/18/24 0938    senna-docusate 8.6-50 mg per tablet 2 tablet  2 tablet Oral BID Alejandrina Duggan MD        sevelamer carbonate tablet 1,600 mg  1,600 mg Oral TIDWM Alejandrina Duggan MD   1,600 mg at 04/18/24 0638    simethicone chewable tablet 80 mg  1 tablet Oral TID PRN Alejandrina Duggan MD         traZODone tablet 50 mg  50 mg Oral Nightly PRN Alejandrina Duggan MD        vancomycin - pharmacy to dose   Intravenous pharmacy to manage frequency Alejandrina Duggan MD         Family History       Problem Relation (Age of Onset)    Cancer Maternal Aunt    Diabetes Daughter    Hypertension Mother, Father          Tobacco Use    Smoking status: Former     Current packs/day: 1.00     Average packs/day: 1 pack/day for 4.0 years (4.0 ttl pk-yrs)     Types: Cigarettes     Start date: 5/6/2020     Quit date: 5/6/2015    Smokeless tobacco: Never   Substance and Sexual Activity    Alcohol use: Never    Drug use: Never    Sexual activity: Not Currently     Review of Systems   Constitutional:  Positive for fever. Negative for unexpected weight change.   HENT:  Negative for nosebleeds and sinus pain.    Eyes:  Negative for redness and visual disturbance.   Respiratory:  Negative for chest tightness, shortness of breath and wheezing.    Cardiovascular:  Negative for chest pain and leg swelling.   Gastrointestinal:  Negative for abdominal distention, abdominal pain, diarrhea and nausea.   Genitourinary:  Negative for difficulty urinating and frequency.   Musculoskeletal:  Positive for arthralgias. Negative for neck stiffness.   Skin:  Negative for color change and rash.   Neurological:  Negative for seizures and headaches.   Hematological:  Negative for adenopathy. Does not bruise/bleed easily.   Psychiatric/Behavioral:  Negative for dysphoric mood. The patient is not nervous/anxious.      Objective:     Vital Signs (Most Recent):  Temp: 99.7 °F (37.6 °C) (04/18/24 1250)  Pulse: (!) 135 (04/18/24 1530)  Resp: 16 (04/18/24 1530)  BP: 137/86 (04/18/24 1530)  SpO2: (!) 94 % (04/18/24 1240) Vital Signs (24h Range):  Temp:  [98.3 °F (36.8 °C)-102.2 °F (39 °C)] 99.7 °F (37.6 °C)  Pulse:  [110-135] 135  Resp:  [11-27] 16  SpO2:  [92 %-100 %] 94 %  BP: ()/() 137/86     Weight: 76.7 kg (169 lb) (04/17/24 2244)  Body  mass index is 24.96 kg/m².  Body surface area is 1.93 meters squared.    No intake/output data recorded.     Physical Exam  Vitals reviewed.   Constitutional:       General: She is not in acute distress.     Appearance: She is not toxic-appearing.   HENT:      Head: Normocephalic and atraumatic.      Nose: Nose normal.      Mouth/Throat:      Mouth: Mucous membranes are moist.      Pharynx: Oropharynx is clear.   Eyes:      General: No scleral icterus.     Conjunctiva/sclera: Conjunctivae normal.      Pupils: Pupils are equal, round, and reactive to light.   Cardiovascular:      Rate and Rhythm: Normal rate and regular rhythm.   Pulmonary:      Effort: No respiratory distress.      Breath sounds: Normal breath sounds.   Abdominal:      General: Bowel sounds are normal.      Palpations: Abdomen is soft. There is no mass.   Musculoskeletal:         General: No swelling or tenderness.      Cervical back: Normal range of motion and neck supple.      Comments: Patient has multiple dressings overlying her thighs bilaterally in her calf region.  The associated subcu area is hard to palpation.   Lymphadenopathy:      Cervical: No cervical adenopathy.   Skin:     General: Skin is warm and dry.      Findings: No erythema.   Neurological:      General: No focal deficit present.      Mental Status: Mental status is at baseline.   Psychiatric:         Mood and Affect: Mood normal.         Behavior: Behavior normal.          Significant Labs:  All labs within the past 24 hours have been reviewed.    Significant Imaging:    Assessment/Plan:     Cardiac/Vascular  Secondary hypertension  Continue present antihypertensives    Renal/  * ESRD (end stage renal disease) on dialysis  Continue HD support    Pyuria  Patient is asymptomatic as far as a UTI goes, she is on antibiotics for her leg wounds.    Immunology/Multi System  SLE (systemic lupus erythematosus)  Patient used to take prednisone for lupus but is no longer taking  this.    Oncology  Anemia in chronic kidney disease  Will start patient on EPO    Endocrine  Secondary hyperparathyroidism  Continue sevelamer    Orthopedic  Sacral decubitus ulcer  Continue wound care    Calciphylaxis with nonhealing ulcer of leg  Continue wound care and IV antibiotics        Thank you for your consult. I will follow-up with patient. Please contact us if you have any additional questions.    Ben Crawford MD  Nephrology  [facility]

## 2024-04-18 NOTE — ANESTHESIA PROCEDURE NOTES
Intubation    Date/Time: 4/18/2024 9:29 AM    Performed by: Tony Conway CRNA  Authorized by: Lisandro Marroquin MD    Intubation:     Induction:  Intravenous    Intubated:  Postinduction    Mask Ventilation:  Not attempted    Attempts:  1    Attempted By:  CRNA    Difficult Airway Encountered?: No      Complications:  None    Airway Device:  Supraglottic airway/LMA    Airway Device Size:  3.0    Style/Cuff Inflation:  Cuffed (inflated to minimal occlusive pressure)    Placement Verified By:  Capnometry    Complicating Factors:  None    Findings Post-Intubation:  BS equal bilateral and atraumatic/condition of teeth unchanged

## 2024-04-18 NOTE — ASSESSMENT & PLAN NOTE
Surgery has been consulted though not sure this is a surgical problem  Broad spectrum IV abx started and cultures are pending.  Wound care team also consulted,.  Perhaps nephrology could offer some recommendations since they may see this more often in HD.

## 2024-04-18 NOTE — SUBJECTIVE & OBJECTIVE
Current Facility-Administered Medications   Medication Dose Route Frequency Provider Last Rate Last Admin    acetaminophen tablet 1,000 mg  1,000 mg Oral Q6H PRN Alejandrina Duggan MD   1,000 mg at 04/18/24 0841    bisacodyL EC tablet 10 mg  10 mg Oral Daily PRN Alejandrina Duggan MD        buPROPion tablet 75 mg  75 mg Oral BID Alejandrina Duggan MD        carvediloL tablet 25 mg  25 mg Oral BID WM Alejandrina Duggan MD        dextromethorphan-guaiFENesin  mg/5 ml liquid 10 mL  10 mL Oral Q6H PRN Alejandrina Duggan MD        famotidine tablet 20 mg  20 mg Oral Daily Alejandrina Duggan MD        heparin (porcine) injection 5,000 Units  5,000 Units Subcutaneous Q12H Alejandrina Duggan MD        hydrALAZINE tablet 100 mg  100 mg Oral Q8H Alejandrina Duggan MD   100 mg at 04/18/24 0543    isosorbide mononitrate 24 hr tablet 30 mg  30 mg Oral Daily Alejandrina Duggan MD        melatonin tablet 6 mg  6 mg Oral Nightly PRN Alejandrina Duggan MD        OLANZapine tablet 5 mg  5 mg Oral QHS Alejandrina Duggan MD        ondansetron injection 8 mg  8 mg Intravenous Q6H PRN Alejandrina Duggan MD        oxyCODONE immediate release tablet 15 mg  15 mg Oral Q6H PRN Alejandrina Duggan MD        piperacillin-tazobactam (ZOSYN) 4.5 g in dextrose 5 % in water (D5W) 100 mL IVPB (MB+)  4.5 g Intravenous Q12H Alejandrina Duggan MD        senna-docusate 8.6-50 mg per tablet 2 tablet  2 tablet Oral BID Alejandrina Duggan MD        sevelamer carbonate tablet 1,600 mg  1,600 mg Oral TIDWM Alejandrina Duggan MD   1,600 mg at 04/18/24 0638    simethicone chewable tablet 80 mg  1 tablet Oral TID PRN Alejandrina Duggan MD        traZODone tablet 50 mg  50 mg Oral Nightly PRN Alejandrina Duggan MD        vancomycin - pharmacy to dose   Intravenous pharmacy to manage frequency Alejandrina Duggan MD           Review of patient's allergies indicates:   Allergen Reactions    Iodine Swelling     Causes  swelling, itching , and nausea.       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 MD    DIALYSIS FISTULA CREATION Left 06/02/2022    Upper extremity by Ben Hager MD    TUBAL LIGATION       Family History       Problem Relation (Age of Onset)    Cancer Maternal Aunt    Diabetes Daughter    Hypertension Mother, Father          Tobacco Use    Smoking status: Former     Current packs/day: 1.00     Average packs/day: 1 pack/day for 4.0 years (4.0 ttl pk-yrs)     Types: Cigarettes     Start date: 5/6/2020     Quit date: 5/6/2015    Smokeless tobacco: Never   Substance and Sexual Activity    Alcohol use: Never    Drug use: Never    Sexual activity: Not Currently     Review of Systems   Constitutional: Negative.  Negative for fatigue and unexpected weight change.   HENT: Negative.  Negative for trouble swallowing.    Eyes: Negative.    Respiratory: Negative.  Negative for chest tightness and shortness of breath.    Cardiovascular: Negative.  Negative for chest pain.   Gastrointestinal: Negative.  Negative for abdominal pain, blood in stool and nausea.   Endocrine: Negative.    Genitourinary: Negative.  Negative for hematuria.   Musculoskeletal: Negative.  Negative for back pain and myalgias.   Neurological: Negative.  Negative for dizziness, speech difficulty, weakness and light-headedness.   Psychiatric/Behavioral: Negative.  Negative  for agitation and behavioral problems.      Objective:     Vital Signs (Most Recent):  Temp: (!) 102 °F (38.9 °C) (04/18/24 0841)  Pulse: (!) 118 (04/18/24 0803)  Resp: 18 (04/18/24 0700)  BP: 131/85 (04/18/24 0803)  SpO2: (!) 93 % (04/18/24 0803) Vital Signs (24h Range):  Temp:  [98.3 °F (36.8 °C)-102.2 °F (39 °C)] 102 °F (38.9 °C)  Pulse:  [114-127] 118  Resp:  [11-20] 18  SpO2:  [92 %-100 %] 93 %  BP: (108-149)/() 131/85     Weight: 76.7 kg (169 lb)  Body mass index is 24.96 kg/m².     Physical Exam  Constitutional:       General: She is not in acute distress.     Appearance: Normal appearance.   HENT:      Head: Normocephalic.   Cardiovascular:      Rate and Rhythm: Normal rate.   Pulmonary:      Effort: Pulmonary effort is normal. No respiratory distress.   Abdominal:      General: There is no distension.      Tenderness: There is no abdominal tenderness.   Musculoskeletal:         General: Normal range of motion.        Back:         Legs:       Comments: Unstageable sacral decubitus; bilateral lower extremity wounds; see pictures in media   Skin:     General: Skin is warm.      Coloration: Skin is not jaundiced.   Neurological:      General: No focal deficit present.      Mental Status: She is alert and oriented to person, place, and time.      Cranial Nerves: No cranial nerve deficit.            I have reviewed all pertinent lab results within the past 24 hours.  CBC:   Recent Labs   Lab 04/17/24  2245   WBC 8.88   RBC 2.38*   HGB 6.7*   HCT 22.2*      MCV 93.3   MCH 28.2   MCHC 30.2*     BMP:   Recent Labs   Lab 04/18/24  0024   GLU 90      K 4.5      CO2 30   BUN 41*   CREATININE 4.98*   CALCIUM 8.6       Significant Diagnostics:  I have reviewed all pertinent imaging results/findings within the past 24 hours.

## 2024-04-18 NOTE — ANESTHESIA PREPROCEDURE EVALUATION
04/18/2024  Madhav Correa is a 33 y.o., female.      Pre-op Assessment    I have reviewed the Patient Summary Reports.     I have reviewed the Nursing Notes. I have reviewed the NPO Status.   I have reviewed the Medications.     Review of Systems  Anesthesia Hx:  No problems with previous Anesthesia             Denies Family Hx of Anesthesia complications.    Denies Personal Hx of Anesthesia complications.                    Social:  Non-Smoker, No Alcohol Use       Hematology/Oncology:    Oncology Normal    -- Anemia:                                  EENT/Dental:  EENT/Dental Normal           Cardiovascular:     Hypertension       CHF       ECG has been reviewed.                          Pulmonary:    Asthma                    Renal/:  Chronic Renal Disease, CKD, ESRD, Dialysis                Hepatic/GI:     GERD             Musculoskeletal:  Musculoskeletal Normal                Neurological:  Neurology Normal                                      Endocrine:  Endocrine Normal            Dermatological:  Skin Normal Lupus  Sacral decubitus   Psych:  Psychiatric History anxiety                 Physical Exam  General: Well nourished    Airway:  Mallampati: II / II  Mouth Opening: Normal  TM Distance: > 6 cm  Tongue: Normal  Neck ROM: Normal ROM    Chest/Lungs:  Clear to auscultation, Normal Respiratory Rate    Heart:  Rate: Normal  Rhythm: Regular Rhythm        Chemistry        Component Value Date/Time     04/18/2024 0024     (LL) 02/14/2024 1154    K 4.5 04/18/2024 0024    K 5.4 (H) 02/04/2024 1703     04/18/2024 0024    CO2 30 04/18/2024 0024    BUN 41 (H) 04/18/2024 0024    BUN 25 02/24/2024 1254    CREATININE 4.98 (H) 04/18/2024 0024    GLU 90 04/18/2024 0024        Component Value Date/Time    CALCIUM 8.6 04/18/2024 0024    ALKPHOS 72 04/18/2024 0024    AST <3 (L) 04/18/2024  0024    ALT <6 (L) 04/18/2024 0024    BILITOT 0.5 04/18/2024 0024        Lab Results   Component Value Date    WBC 8.88 04/17/2024    HGB 6.7 (L) 04/17/2024    HCT 22.2 (L) 04/17/2024     04/17/2024     No results found for this or any previous visit.      Anesthesia Plan  Type of Anesthesia, risks & benefits discussed:    Anesthesia Type: Gen Supraglottic Airway  Intra-op Monitoring Plan: Standard ASA Monitors  Post Op Pain Control Plan: multimodal analgesia  Induction:  IV  Airway Plan: Direct, Post-Induction  Informed Consent: Informed consent signed with the Patient representative and all parties understand the risks and agree with anesthesia plan.  All questions answered. Patient consented to blood products? Yes  ASA Score: 4  Day of Surgery Review of History & Physical: H&P Update referred to the surgeon/provider.I have interviewed and examined the patient. I have reviewed the patient's H&P dated: There are no significant changes.     Ready For Surgery From Anesthesia Perspective.     .

## 2024-04-18 NOTE — ANESTHESIA POSTPROCEDURE EVALUATION
Anesthesia Post Evaluation    Patient: Madhav Correa    Procedure(s) Performed: Procedure(s) (LRB):  DEBRIDEMENT, WOUND, SACRUM (N/A)  DEBRIDEMENT, LOWER EXTREMITY (Bilateral)    Final Anesthesia Type: general      Patient location during evaluation: PACU  Patient participation: Yes- Able to Participate  Level of consciousness: awake and alert and oriented  Post-procedure vital signs: reviewed and stable  Pain management: adequate  Airway patency: patent  GERONIMO mitigation strategies: Multimodal analgesia  PONV status at discharge: No PONV  Anesthetic complications: no      Cardiovascular status: hemodynamically stable  Respiratory status: unassisted and spontaneous ventilation  Hydration status: euvolemic  Follow-up not needed.              Vitals Value Taken Time   BP 97/64 04/18/24 1131   Temp 37.8 °C (100 °F) 04/18/24 1131   Pulse 117 04/18/24 1133   Resp 16 04/18/24 1133   SpO2 97 % 04/18/24 1133   Vitals shown include unfiled device data.      No case tracking events are documented in the log.      Pain/Chandler Score: Pain Rating Prior to Med Admin: 0 (4/18/2024  8:41 AM)  Pain Rating Post Med Admin: 0 (4/18/2024  9:41 AM)

## 2024-04-18 NOTE — ASSESSMENT & PLAN NOTE
Previous cultures were pseudomonas and MSSA and treated with meropenum and levaquin.  Patient given zosyn in ED after cultures drawn.  I have added vancomycin.  Current lactic acid is normal and other than tachycardia (which may be related to her anemia) she does not meet sepsis criteria.

## 2024-04-18 NOTE — PLAN OF CARE
[facility]  Initial Discharge Assessment       Primary Care Provider: Rehab, [facility]    Admission Diagnosis: Sacral decubitus ulcer [L89.159]  Wound infection [T14.8XXA, L08.9]  Fever [R50.9]  Infected decubitus ulcer, unspecified ulcer stage [L89.90, L08.9]    Admission Date: 4/17/2024  Expected Discharge Date:     Transition of Care Barriers: None    Payor: MEDICARE / Plan: MEDICARE PART A & B / Product Type: Government /     Extended Emergency Contact Information  Primary Emergency Contact: Ghazal Parrish  Mobile Phone: [phone]  Relation: Mother  Secondary Emergency Contact: Rob Lopez  Home Phone: [phone]  Relation: Sister  Father: Deo Parrish  Home Phone: [phone]    Discharge Plan A: Skilled Nursing Facility  Discharge Plan B: Skilled Nursing Facility      [facility] [place] [address]  Phone: [phone] Fax: [phone]      Initial Assessment (most recent)       Adult Discharge Assessment - 04/18/24 1007          Discharge Assessment    Assessment Type Discharge Planning Assessment     Confirmed/corrected address, phone number and insurance Yes     Confirmed Demographics Correct on Facesheet     Source of Information family     If unable to respond/provide information was family/caregiver contacted? Yes     Contact Name/Number Ghazal Parrish, [id number]     Communicated ESTHER with patient/caregiver Date not available/Unable to determine     People in Home child(adam), dependent;parent(s)     Facility Arrived From: [facility]     Do you expect to return to your current living situation? Yes     Do you have help at home or someone to help you manage your care at home? Yes     Who are your caregiver(s) and their phone number(s)? Ghazal Parrish, mother, [phone]     Prior to hospitilization cognitive status: Unable to Assess     Current cognitive status: Unable to Assess      Walking or Climbing Stairs Difficulty yes     Walking or Climbing Stairs ambulation difficulty, requires equipment     Mobility Management walker     Do you have any problems with: Needs other help     Specify other help Mother transports     Home Accessibility stairs to enter home     Number of Stairs, Main Entrance seven     Equipment Currently Used at Home walker, rolling     Readmission within 30 days? No     Do you currently have service(s) that help you manage your care at home? No     Do you take prescription medications? Yes     Do you have prescription coverage? Yes     Coverage Medicare     Do you have any problems affording any of your prescribed medications? No     Is the patient taking medications as prescribed? yes     Who is going to help you get home at discharge? Mother, Ghazal Parrish     How do you get to doctors appointments? family or friend will provide     Are you on dialysis? Yes     Dialysis Name and Scheduled days HD TTS     Do you take coumadin? No     Discharge Plan A Skilled Nursing Facility     Discharge Plan B Skilled Nursing Facility     DME Needed Upon Discharge  none     Discharge Plan discussed with: Parent(s)     Name(s) and Number(s) Ghazal Parrish [id number]     Transition of Care Barriers None        Physical Activity    On average, how many days per week do you engage in moderate to strenuous exercise (like a brisk walk)? 0 days     On average, how many minutes do you engage in exercise at this level? 0 min        Financial Resource Strain    How hard is it for you to pay for the very basics like food, housing, medical care, and heating? Not hard at all        Housing Stability    In the last 12 months, was there a time when you were not able to pay the mortgage or rent on time? No     In the past 12 months, how many times have you moved where you were living? 0     At any time in the past 12 months, were you homeless or living in a shelter (including now)? No         Transportation Needs    In the past 12 months, has lack of transportation kept you from medical appointments or from getting medications? No     In the past 12 months, has lack of transportation kept you from meetings, work, or from getting things needed for daily living? No        Food Insecurity    Within the past 12 months, you worried that your food would run out before you got the money to buy more. Never true     Within the past 12 months, the food you bought just didn't last and you didn't have money to get more. Never true        Stress    Do you feel stress - tense, restless, nervous, or anxious, or unable to sleep at night because your mind is troubled all the time - these days? Only a little        Social Connections    In a typical week, how many times do you talk on the phone with family, friends, or neighbors? More than three times a week     How often do you get together with friends or relatives? More than three times a week     How often do you attend Tenriism or Temple services? More than 4 times per year     Do you belong to any clubs or organizations such as Tenriism groups, unions, fraternal or athletic groups, or school groups? No     How often do you attend meetings of the clubs or organizations you belong to? Never     Are you , , , , never , or living with a partner? Never         Alcohol Use    Q1: How often do you have a drink containing alcohol? Never     Q2: How many drinks containing alcohol do you have on a typical day when you are drinking? Patient does not drink     Q3: How often do you have six or more drinks on one occasion? Never        OTHER    Name(s) of People in Home mother and father, dependent children                   Pt in surgery and has contact isolations. SS spoke with pt's mother via phone. Pt arrived from [facility], confirmed with [facility] that they are able to accept her when medically ready for dc. Initial info faxed to  [place]/packet started. IM updated. SS following for anticipated dc needs.

## 2024-04-18 NOTE — ASSESSMENT & PLAN NOTE
Continue her home buspar and nighttime zyprexa.  Patient appears depressed with flat affect but she assures me she is not and is hopeful that we can help her and for her future.  However, she has been told of her dismal prognosis.  Will not consult psychiatry at this time.  Patient states her condition is controlled on current psychotropics.

## 2024-04-18 NOTE — SUBJECTIVE & OBJECTIVE
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 MD    DIALYSIS FISTULA CREATION Left 06/02/2022    Upper extremity by Ben Hager MD    TUBAL LIGATION         Review of patient's allergies indicates:   Allergen Reactions    Iodine Swelling     Causes swelling, itching , and nausea.     Current Facility-Administered Medications   Medication Dose Route Frequency Provider Last Rate Last Admin    0.9%  NaCl infusion   Intravenous PRN Elvis Ndiaye DO        acetaminophen tablet 1,000 mg  1,000 mg Oral Q6H PRN Alejandrina Duggan MD   1,000 mg at 04/18/24 0841    bisacodyL EC tablet 10 mg  10 mg Oral Daily PRN Alejandrina Duggan MD        buPROPion tablet 75 mg  75 mg Oral BID Alejandrina Duggan MD        carvediloL tablet 25 mg  25 mg Oral BID WM Alejandrina Duggan MD        dextromethorphan-guaiFENesin  mg/5 ml liquid 10 mL  10 mL Oral Q6H PRN Alejandrina Duggan MD        famotidine tablet 20 mg  20 mg Oral Daily Alejandrina Duggan MD        heparin (porcine) injection 5,000 Units  5,000 Units Subcutaneous Q12H Alejandrina Duggan MD        hydrALAZINE tablet 100 mg  100 mg Oral Q8H Alejandrina Duggan MD   100 mg at 04/18/24 0543    isosorbide mononitrate 24 hr tablet 30 mg  30 mg Oral Daily Alejandrina Duggan MD        melatonin tablet 6 mg  6 mg Oral Nightly PRN Alejandrina Duggan MD        mupirocin 2 %  ointment   Nasal BID Elvis Ndiaye DO        OLANZapine tablet 5 mg  5 mg Oral QHS Alejandrina Duggan MD        ondansetron injection 8 mg  8 mg Intravenous Q6H PRN Alejandrina Duggan MD        oxyCODONE immediate release tablet 15 mg  15 mg Oral Q6H PRN Alejandrina Duggan MD        piperacillin-tazobactam (ZOSYN) 4.5 g in dextrose 5 % in water (D5W) 100 mL IVPB (MB+)  4.5 g Intravenous Q12H Alejandrina Duggan MD   4.5 g at 04/18/24 0938    senna-docusate 8.6-50 mg per tablet 2 tablet  2 tablet Oral BID Alejandrina Duggan MD        sevelamer carbonate tablet 1,600 mg  1,600 mg Oral TIDWM Alejandrina Duggan MD   1,600 mg at 04/18/24 0638    simethicone chewable tablet 80 mg  1 tablet Oral TID PRN Alejandrina Duggan MD        traZODone tablet 50 mg  50 mg Oral Nightly PRN Alejandrina Duggan MD        vancomycin - pharmacy to dose   Intravenous pharmacy to manage frequency Alejnadrina Duggan MD         Family History       Problem Relation (Age of Onset)    Cancer Maternal Aunt    Diabetes Daughter    Hypertension Mother, Father          Tobacco Use    Smoking status: Former     Current packs/day: 1.00     Average packs/day: 1 pack/day for 4.0 years (4.0 ttl pk-yrs)     Types: Cigarettes     Start date: 5/6/2020     Quit date: 5/6/2015    Smokeless tobacco: Never   Substance and Sexual Activity    Alcohol use: Never    Drug use: Never    Sexual activity: Not Currently     Review of Systems   Constitutional:  Positive for fever. Negative for unexpected weight change.   HENT:  Negative for nosebleeds and sinus pain.    Eyes:  Negative for redness and visual disturbance.   Respiratory:  Negative for chest tightness, shortness of breath and wheezing.    Cardiovascular:  Negative for chest pain and leg swelling.   Gastrointestinal:  Negative for abdominal distention, abdominal pain, diarrhea and nausea.   Genitourinary:  Negative for difficulty urinating and frequency.   Musculoskeletal:  Positive for  arthralgias. Negative for neck stiffness.   Skin:  Negative for color change and rash.   Neurological:  Negative for seizures and headaches.   Hematological:  Negative for adenopathy. Does not bruise/bleed easily.   Psychiatric/Behavioral:  Negative for dysphoric mood. The patient is not nervous/anxious.      Objective:     Vital Signs (Most Recent):  Temp: 99.7 °F (37.6 °C) (04/18/24 1250)  Pulse: (!) 135 (04/18/24 1530)  Resp: 16 (04/18/24 1530)  BP: 137/86 (04/18/24 1530)  SpO2: (!) 94 % (04/18/24 1240) Vital Signs (24h Range):  Temp:  [98.3 °F (36.8 °C)-102.2 °F (39 °C)] 99.7 °F (37.6 °C)  Pulse:  [110-135] 135  Resp:  [11-27] 16  SpO2:  [92 %-100 %] 94 %  BP: ()/() 137/86     Weight: 76.7 kg (169 lb) (04/17/24 2244)  Body mass index is 24.96 kg/m².  Body surface area is 1.93 meters squared.    No intake/output data recorded.     Physical Exam  Vitals reviewed.   Constitutional:       General: She is not in acute distress.     Appearance: She is not toxic-appearing.   HENT:      Head: Normocephalic and atraumatic.      Nose: Nose normal.      Mouth/Throat:      Mouth: Mucous membranes are moist.      Pharynx: Oropharynx is clear.   Eyes:      General: No scleral icterus.     Conjunctiva/sclera: Conjunctivae normal.      Pupils: Pupils are equal, round, and reactive to light.   Cardiovascular:      Rate and Rhythm: Normal rate and regular rhythm.   Pulmonary:      Effort: No respiratory distress.      Breath sounds: Normal breath sounds.   Abdominal:      General: Bowel sounds are normal.      Palpations: Abdomen is soft. There is no mass.   Musculoskeletal:         General: No swelling or tenderness.      Cervical back: Normal range of motion and neck supple.      Comments: Patient has multiple dressings overlying her thighs bilaterally in her calf region.  The associated subcu area is hard to palpation.   Lymphadenopathy:      Cervical: No cervical adenopathy.   Skin:     General: Skin is warm and  dry.      Findings: No erythema.   Neurological:      General: No focal deficit present.      Mental Status: Mental status is at baseline.   Psychiatric:         Mood and Affect: Mood normal.         Behavior: Behavior normal.          Significant Labs:  All labs within the past 24 hours have been reviewed.    Significant Imaging:

## 2024-04-18 NOTE — OR NURSING
1122 REC'D TO PACU IN STABLE CONDITION. VSS. SATS 100% ON 6L/FM. WILL TITRATE O2 ATOL. DENIES PAIN AT THIS TIME. DSG TO MATTHEW THIGHS & R LOWER LEG C/D/I. SMALL AMT OF DRAINAGE NOTED TO SACRAL DSG. AREA MARKED. WILL CONT TO MONITOR.    1150 UPDATE ON PT STATUS GIVEN TO MOTHER VIA TEXT MESSAGE.    1207 ORDERS & OP NOTE NEED TO RELEASE PT FROM PACU. NOTIFIED DR ALY.     1210 REPORT GIVEN TO MARCUS PATEL ON 6N. PT TO GO TO DIALYSIS AFTER RECOVERY.    1250 TRANSFERRED TO DIALYSIS IN STABLE CONDITION. REPORT GIVEN TO SHANTANU PATEL. /85 .

## 2024-04-18 NOTE — NURSING
1210 Report received from Linnea in surgery. Patient will be transported directly to dialysis from surgery.

## 2024-04-19 PROBLEM — D62 ACUTE BLOOD LOSS ANEMIA: Status: ACTIVE | Noted: 2024-04-19

## 2024-04-19 LAB
ALBUMIN SERPL BCP-MCNC: 1.7 G/DL (ref 3.5–5)
ANION GAP SERPL CALCULATED.3IONS-SCNC: 10 MMOL/L (ref 7–16)
BASOPHILS # BLD AUTO: 0.01 K/UL (ref 0–0.2)
BASOPHILS NFR BLD AUTO: 0.1 % (ref 0–1)
BUN SERPL-MCNC: 30 MG/DL (ref 7–18)
BUN/CREAT SERPL: 7 (ref 6–20)
CALCIUM SERPL-MCNC: 7.9 MG/DL (ref 8.5–10.1)
CHLORIDE SERPL-SCNC: 105 MMOL/L (ref 98–107)
CO2 SERPL-SCNC: 27 MMOL/L (ref 21–32)
CREAT SERPL-MCNC: 4.3 MG/DL (ref 0.55–1.02)
DIFFERENTIAL METHOD BLD: ABNORMAL
EGFR (NO RACE VARIABLE) (RUSH/TITUS): 13 ML/MIN/1.73M2
EOSINOPHIL # BLD AUTO: 0.31 K/UL (ref 0–0.5)
EOSINOPHIL NFR BLD AUTO: 3.3 % (ref 1–4)
ERYTHROCYTE [DISTWIDTH] IN BLOOD BY AUTOMATED COUNT: 19.9 % (ref 11.5–14.5)
ERYTHROCYTE [SEDIMENTATION RATE] IN BLOOD BY WESTERGREN METHOD: 68 MM/HR (ref 0–20)
GLUCOSE SERPL-MCNC: 132 MG/DL (ref 74–106)
HCT VFR BLD AUTO: 20.8 % (ref 38–47)
HCT VFR BLD AUTO: 23.1 % (ref 38–47)
HGB BLD-MCNC: 6.2 G/DL (ref 12–16)
HGB BLD-MCNC: 7 G/DL (ref 12–16)
IMM GRANULOCYTES # BLD AUTO: 0.04 K/UL (ref 0–0.04)
IMM GRANULOCYTES NFR BLD: 0.4 % (ref 0–0.4)
LYMPHOCYTES # BLD AUTO: 1.82 K/UL (ref 1–4.8)
LYMPHOCYTES NFR BLD AUTO: 19.5 % (ref 27–41)
MCH RBC QN AUTO: 27.9 PG (ref 27–31)
MCHC RBC AUTO-ENTMCNC: 29.8 G/DL (ref 32–36)
MCV RBC AUTO: 93.7 FL (ref 80–96)
MONOCYTES # BLD AUTO: 1.46 K/UL (ref 0–0.8)
MONOCYTES NFR BLD AUTO: 15.7 % (ref 2–6)
MPC BLD CALC-MCNC: 9.4 FL (ref 9.4–12.4)
NEUTROPHILS # BLD AUTO: 5.67 K/UL (ref 1.8–7.7)
NEUTROPHILS NFR BLD AUTO: 61 % (ref 53–65)
NRBC # BLD AUTO: 0 X10E3/UL
NRBC, AUTO (.00): 0 %
PHOSPHATE SERPL-MCNC: 2.2 MG/DL (ref 2.5–4.5)
PLATELET # BLD AUTO: 173 K/UL (ref 150–400)
POTASSIUM SERPL-SCNC: 4.6 MMOL/L (ref 3.5–5.1)
RBC # BLD AUTO: 2.22 M/UL (ref 4.2–5.4)
SODIUM SERPL-SCNC: 137 MMOL/L (ref 136–145)
VANCOMYCIN SERPL-MCNC: 15.1 ΜG/ML (ref 0–20)
WBC # BLD AUTO: 9.31 K/UL (ref 4.5–11)

## 2024-04-19 PROCEDURE — 11000001 HC ACUTE MED/SURG PRIVATE ROOM

## 2024-04-19 PROCEDURE — 63600175 PHARM REV CODE 636 W HCPCS: Performed by: HOSPITALIST

## 2024-04-19 PROCEDURE — 99233 SBSQ HOSP IP/OBS HIGH 50: CPT | Mod: ,,, | Performed by: STUDENT IN AN ORGANIZED HEALTH CARE EDUCATION/TRAINING PROGRAM

## 2024-04-19 PROCEDURE — 85018 HEMOGLOBIN: CPT | Performed by: STUDENT IN AN ORGANIZED HEALTH CARE EDUCATION/TRAINING PROGRAM

## 2024-04-19 PROCEDURE — 85651 RBC SED RATE NONAUTOMATED: CPT | Performed by: HOSPITALIST

## 2024-04-19 PROCEDURE — 80069 RENAL FUNCTION PANEL: CPT | Performed by: STUDENT IN AN ORGANIZED HEALTH CARE EDUCATION/TRAINING PROGRAM

## 2024-04-19 PROCEDURE — 25000003 PHARM REV CODE 250: Performed by: STUDENT IN AN ORGANIZED HEALTH CARE EDUCATION/TRAINING PROGRAM

## 2024-04-19 PROCEDURE — 86923 COMPATIBILITY TEST ELECTRIC: CPT | Mod: 91 | Performed by: INTERNAL MEDICINE

## 2024-04-19 PROCEDURE — 25000003 PHARM REV CODE 250: Performed by: HOSPITALIST

## 2024-04-19 PROCEDURE — 63600175 PHARM REV CODE 636 W HCPCS: Performed by: STUDENT IN AN ORGANIZED HEALTH CARE EDUCATION/TRAINING PROGRAM

## 2024-04-19 PROCEDURE — 85025 COMPLETE CBC W/AUTO DIFF WBC: CPT | Performed by: STUDENT IN AN ORGANIZED HEALTH CARE EDUCATION/TRAINING PROGRAM

## 2024-04-19 PROCEDURE — 80202 ASSAY OF VANCOMYCIN: CPT | Performed by: STUDENT IN AN ORGANIZED HEALTH CARE EDUCATION/TRAINING PROGRAM

## 2024-04-19 PROCEDURE — 86923 COMPATIBILITY TEST ELECTRIC: CPT | Performed by: STUDENT IN AN ORGANIZED HEALTH CARE EDUCATION/TRAINING PROGRAM

## 2024-04-19 RX ORDER — METRONIDAZOLE 500 MG/1
500 TABLET ORAL EVERY 12 HOURS
Status: COMPLETED | OUTPATIENT
Start: 2024-04-19 | End: 2024-04-25

## 2024-04-19 RX ORDER — HYDROCODONE BITARTRATE AND ACETAMINOPHEN 500; 5 MG/1; MG/1
TABLET ORAL
Status: DISCONTINUED | OUTPATIENT
Start: 2024-04-19 | End: 2024-04-30 | Stop reason: HOSPADM

## 2024-04-19 RX ORDER — HYDROCODONE BITARTRATE AND ACETAMINOPHEN 500; 5 MG/1; MG/1
TABLET ORAL
Status: DISCONTINUED | OUTPATIENT
Start: 2024-04-19 | End: 2024-04-20

## 2024-04-19 RX ORDER — SILVER SULFADIAZINE 10 G/1000G
CREAM TOPICAL DAILY
Status: DISCONTINUED | OUTPATIENT
Start: 2024-04-20 | End: 2024-04-26

## 2024-04-19 RX ORDER — MUPIROCIN 20 MG/G
OINTMENT TOPICAL DAILY
Status: DISCONTINUED | OUTPATIENT
Start: 2024-04-19 | End: 2024-04-30 | Stop reason: HOSPADM

## 2024-04-19 RX ADMIN — OLANZAPINE 5 MG: 5 TABLET, FILM COATED ORAL at 08:04

## 2024-04-19 RX ADMIN — ACETAMINOPHEN 1000 MG: 500 TABLET ORAL at 05:04

## 2024-04-19 RX ADMIN — HEPARIN SODIUM 5000 UNITS: 5000 INJECTION, SOLUTION INTRAVENOUS; SUBCUTANEOUS at 08:04

## 2024-04-19 RX ADMIN — SEVELAMER CARBONATE 1600 MG: 800 TABLET, FILM COATED ORAL at 08:04

## 2024-04-19 RX ADMIN — BUPROPION HYDROCHLORIDE 75 MG: 75 TABLET, FILM COATED ORAL at 08:04

## 2024-04-19 RX ADMIN — OXYCODONE 15 MG: 5 TABLET ORAL at 01:04

## 2024-04-19 RX ADMIN — MUPIROCIN: 20 OINTMENT TOPICAL at 08:04

## 2024-04-19 RX ADMIN — SEVELAMER CARBONATE 1600 MG: 800 TABLET, FILM COATED ORAL at 11:04

## 2024-04-19 RX ADMIN — SODIUM CHLORIDE 500 ML: 9 INJECTION, SOLUTION INTRAVENOUS at 08:04

## 2024-04-19 RX ADMIN — MORPHINE SULFATE 4 MG: 4 INJECTION, SOLUTION INTRAMUSCULAR; INTRAVENOUS at 06:04

## 2024-04-19 RX ADMIN — PIPERACILLIN AND TAZOBACTAM 4.5 G: 4; .5 INJECTION, POWDER, FOR SOLUTION INTRAVENOUS; PARENTERAL at 11:04

## 2024-04-19 RX ADMIN — OXYCODONE 15 MG: 5 TABLET ORAL at 04:04

## 2024-04-19 RX ADMIN — MORPHINE SULFATE 4 MG: 4 INJECTION, SOLUTION INTRAMUSCULAR; INTRAVENOUS at 11:04

## 2024-04-19 RX ADMIN — METRONIDAZOLE 500 MG: 500 TABLET ORAL at 08:04

## 2024-04-19 RX ADMIN — FAMOTIDINE 20 MG: 20 TABLET ORAL at 08:04

## 2024-04-19 RX ADMIN — SEVELAMER CARBONATE 1600 MG: 800 TABLET, FILM COATED ORAL at 04:04

## 2024-04-19 RX ADMIN — CARVEDILOL 25 MG: 25 TABLET, FILM COATED ORAL at 04:04

## 2024-04-19 RX ADMIN — OXYCODONE 15 MG: 5 TABLET ORAL at 08:04

## 2024-04-19 RX ADMIN — PIPERACILLIN AND TAZOBACTAM 4.5 G: 4; .5 INJECTION, POWDER, FOR SOLUTION INTRAVENOUS; PARENTERAL at 01:04

## 2024-04-19 RX ADMIN — HYDRALAZINE HYDROCHLORIDE 100 MG: 100 TABLET, FILM COATED ORAL at 05:04

## 2024-04-19 RX ADMIN — CARVEDILOL 25 MG: 25 TABLET, FILM COATED ORAL at 08:04

## 2024-04-19 RX ADMIN — SENNOSIDES AND DOCUSATE SODIUM 2 TABLET: 8.6; 5 TABLET ORAL at 08:04

## 2024-04-19 RX ADMIN — MORPHINE SULFATE 4 MG: 4 INJECTION, SOLUTION INTRAMUSCULAR; INTRAVENOUS at 05:04

## 2024-04-19 RX ADMIN — OXYCODONE 15 MG: 5 TABLET ORAL at 11:04

## 2024-04-19 NOTE — ASSESSMENT & PLAN NOTE
Continue her home antihypertensive medications.    Chronic, controlled. Latest blood pressure and vitals reviewed-     Temp:  [98.3 °F (36.8 °C)-102.6 °F (39.2 °C)]   Pulse:  [110-135]   Resp:  [13-27]   BP: ()/(54-94)   SpO2:  [93 %-100 %] .   Home meds for hypertension were reviewed and noted below.   Hypertension Medications               carvediloL (COREG) 25 MG tablet Take 1 tablet by mouth 2 (two) times daily with meals.    hydrALAZINE (APRESOLINE) 100 MG tablet Take 100 mg by mouth every 8 (eight) hours.    isosorbide mononitrate (IMDUR) 30 MG 24 hr tablet Take 30 mg by mouth once daily.            While in the hospital, will manage blood pressure as follows; Continue home antihypertensive regimen    Will utilize p.r.n. blood pressure medication only if patient's blood pressure greater than 180/110 and she develops symptoms such as worsening chest pain or shortness of breath.

## 2024-04-19 NOTE — PLAN OF CARE
Chart reviewed. Pt receiving transfusion due to anemia. Continuing iv abx, wound care, HD TTS. SS will continue to follow for anticipated discharge needs.

## 2024-04-19 NOTE — SUBJECTIVE & OBJECTIVE
Interval History:  No acute events overnight    Review of Systems   Constitutional:  Negative for appetite change, chills, fatigue, fever and unexpected weight change.   HENT:  Negative for congestion, mouth sores, nosebleeds, sinus pain, sore throat and trouble swallowing.    Respiratory:  Negative for apnea, cough, chest tightness and shortness of breath.    Cardiovascular:  Negative for chest pain, palpitations and leg swelling.   Gastrointestinal:  Negative for abdominal pain, blood in stool, constipation, diarrhea, nausea and vomiting.   Genitourinary:  Negative for decreased urine volume, difficulty urinating, dysuria and frequency.   Musculoskeletal:  Positive for arthralgias and back pain. Negative for neck pain.   Skin:  Positive for wound. Negative for rash.   Neurological:  Negative for syncope, light-headedness and headaches.   Hematological:  Does not bruise/bleed easily.   Psychiatric/Behavioral:  Negative for agitation, confusion and suicidal ideas.      Objective:     Vital Signs (Most Recent):  Temp: (!) 101.8 °F (38.8 °C) (04/19/24 0632)  Pulse: (!) 120 (04/19/24 0632)  Resp: 18 (04/19/24 0824)  BP: 106/69 (04/19/24 0632)  SpO2: (!) 94 % (04/19/24 0632) Vital Signs (24h Range):  Temp:  [98.3 °F (36.8 °C)-102.6 °F (39.2 °C)] 101.8 °F (38.8 °C)  Pulse:  [110-135] 120  Resp:  [13-27] 18  SpO2:  [93 %-100 %] 94 %  BP: ()/(54-94) 106/69     Weight: 76.6 kg (168 lb 14 oz)  Body mass index is 24.94 kg/m².    Intake/Output Summary (Last 24 hours) at 4/19/2024 1013  Last data filed at 4/19/2024 0000  Gross per 24 hour   Intake 0 ml   Output 2400 ml   Net -2400 ml         Physical Exam  Vitals and nursing note reviewed. Exam conducted with a chaperone present.   Constitutional:       General: She is not in acute distress.     Appearance: She is ill-appearing. She is not toxic-appearing or diaphoretic.   HENT:      Head: Atraumatic.      Mouth/Throat:      Mouth: Mucous membranes are moist.       Pharynx: Oropharynx is clear.   Eyes:      Conjunctiva/sclera: Conjunctivae normal.      Pupils: Pupils are equal, round, and reactive to light.   Neck:      Vascular: No carotid bruit.   Cardiovascular:      Rate and Rhythm: Regular rhythm. Tachycardia present.      Pulses: Normal pulses.      Heart sounds: Normal heart sounds.      Comments: Bruit auscultated/thrill palpated LUE  Pulmonary:      Effort: Pulmonary effort is normal.      Breath sounds: Normal breath sounds.   Abdominal:      General: Bowel sounds are normal. There is distension.      Palpations: There is shifting dullness and fluid wave.      Tenderness: There is no abdominal tenderness. There is no right CVA tenderness, left CVA tenderness, guarding or rebound.   Musculoskeletal:         General: Deformity present. No tenderness or signs of injury. Normal range of motion.      Cervical back: Neck supple.      Right lower leg: Edema present.      Left lower leg: Edema present.   Skin:     General: Skin is warm and dry.      Capillary Refill: Capillary refill takes less than 2 seconds.      Coloration: Skin is not jaundiced or pale.      Findings: No bruising, lesion or rash.   Neurological:      General: No focal deficit present.      Mental Status: She is alert and oriented to person, place, and time.   Psychiatric:         Mood and Affect: Mood normal. Affect is blunt and flat.             Significant Labs: All pertinent labs within the past 24 hours have been reviewed.    Significant Imaging: I have reviewed all pertinent imaging results/findings within the past 24 hours.

## 2024-04-19 NOTE — PLAN OF CARE
Problem: Skin Injury Risk Increased  Goal: Skin Health and Integrity  Outcome: Ongoing, Progressing  Intervention: Optimize Skin Protection  Flowsheets (Taken 4/19/2024 1230)  Pressure Reduction Techniques:   positioned off wounds   pressure points protected  Skin Protection:   adhesive use limited   incontinence pads utilized  Head of Bed (HOB) Positioning: HOB elevated     Problem: Adult Inpatient Plan of Care  Goal: Plan of Care Review  Outcome: Ongoing, Progressing  Flowsheets (Taken 4/19/2024 1230)  Plan of Care Reviewed With: patient  Goal: Patient-Specific Goal (Individualized)  Outcome: Ongoing, Progressing  Goal: Absence of Hospital-Acquired Illness or Injury  Outcome: Ongoing, Progressing  Intervention: Identify and Manage Fall Risk  Flowsheets (Taken 4/19/2024 1230)  Safety Promotion/Fall Prevention:   assistive device/personal item within reach   bed alarm set   lighting adjusted   medications reviewed  Goal: Optimal Comfort and Wellbeing  Outcome: Ongoing, Progressing  Intervention: Monitor Pain and Promote Comfort  Flowsheets (Taken 4/19/2024 1230)  Pain Management Interventions:   position adjusted   pillow support provided  Goal: Readiness for Transition of Care  Outcome: Ongoing, Progressing     Problem: Infection  Goal: Absence of Infection Signs and Symptoms  Outcome: Ongoing, Progressing  Intervention: Prevent or Manage Infection  Flowsheets (Taken 4/19/2024 1230)  Infection Management: aseptic technique maintained  Isolation Precautions: precautions maintained

## 2024-04-19 NOTE — PROGRESS NOTES
Consult wound care for all of her sacral and lower extremity wounds.    Recommend cleaning with Vashe and applying Silvadene to areas daily per nursing staff until seen by wound care.    General surgery will sign off

## 2024-04-19 NOTE — PROGRESS NOTES
Patient denies shortness of breath.  Review of systems musculoskeletal-patient states her pain is fairly well controlled although she states she was just about a cough for some pain medication.  Skin-patient has calciphylaxis, the patient was getting sodium thiosulfate in [place] she got about 8 weeks but it was discontinued as they felt it was not making any difference in her wound although the patient states she felt like she was improving while there.    Physical exam general patient in no acute distress     Assessment/plan 1.  ESRD-continue HD support  2.  Calciphylaxis-I am going to restart sodium thiosulfate 30 g IV at the end of her dialysis treatments here and will continue this as an outpatient.  3.  Anemia  4.  Pyuria   5. Leg wounds-continue wound care and antibiotics  6.  Sacral wound-continue wound care  7.  Lupus

## 2024-04-19 NOTE — ASSESSMENT & PLAN NOTE
Creatine stable for now. BMP reviewed- noted Estimated Creatinine Clearance: 19.4 mL/min (A) (based on SCr of 4.3 mg/dL (H)). according to latest data. Based on current GFR, CKD stage is stage 5 - GFR < 15.  Monitor UOP and serial BMP and adjust therapy as needed. Renally dose meds. Avoid nephrotoxic medications and procedures.    HD TTF

## 2024-04-19 NOTE — ASSESSMENT & PLAN NOTE
Surgery has been consulted though not sure this is a surgical problem  Broad spectrum IV abx started and cultures are pending.  Wound care team also consulted,.  Perhaps nephrology could offer some recommendations since they may see this more often in HD.      4/19 if this is true biopsy-proven calciphylaxis her prognosis is grim.  There is no approved treatment for this disorder and the pathophysiology is poorly understood.  Six-month mortality is roughly 50% after diagnosis.  We have surgical pathology pending.  The best we can do at this point is to treat her infected wounds.  Hopefully in her particular case this disease we will be self-limiting.  There was apparently extensive palliative care discussions with the patient while for [facility], however she is decided to continue full therapy.

## 2024-04-19 NOTE — NURSING
1333 Order acknowledged to transfuse 1 unit PRBCs. Patient currently with only one peripheral IV. Two attempts made by nursing supervisor to start second peripheral IV with no success. IV Zosyn infusing at this time and will infuse over 4 hours. Spoke with Dr. Crawford with nephrology regarding patient getting midline. Dr. Crawford would like to hold off on midline and suggests waiting and transfusing during dialysis tomorrow. Discussed with Dr. Ndiaye, he will recheck H&H and decide depending on result.     1610 H&H now 7.0/23.1. Discussed with Dr. Ndiaye, will transfuse 1 unit PRBC during dialysis tomorrow. Order to transfuse today discontinued. Blood bank notified. Blood bank states patient's type and screen will still be good for tomorrow morning. Dialysis nurse notified of transfusion tomorrow.

## 2024-04-19 NOTE — ASSESSMENT & PLAN NOTE
This patient does have evidence of infective focus  My overall impression is sepsis.  Source: Skin and Soft Tissue (location lower extremities)  Antibiotics given-   Antibiotics (72h ago, onward)      Start     Stop Route Frequency Ordered    04/19/24 0900  metroNIDAZOLE tablet 500 mg         04/26/24 0859 Oral Every 12 hours 04/19/24 0718    04/19/24 0900  mupirocin 2 % ointment         -- Top Daily 04/19/24 0719    04/18/24 1200  piperacillin-tazobactam (ZOSYN) 4.5 g in dextrose 5 % in water (D5W) 100 mL IVPB (MB+)         -- IV Every 12 hours (non-standard times) 04/18/24 0426    04/18/24 0548  vancomycin - pharmacy to dose         -- IV pharmacy to manage frequency 04/18/24 0448          Latest lactate reviewed-  Recent Labs   Lab 04/17/24  2332   LACTATE 0.8     Organ dysfunction indicated by  none    Fluid challenge Not needed - patient is not hypotensive      Post- resuscitation assessment No - Post resuscitation assessment not needed       Will Not start Pressors- Levophed for MAP of 65  Source control achieved by:     4/19 vancomycin and Zosyn.  Monitor for vancomycin toxicity.  Follow up cultures.  Judicious use of IV fluids given her ESRD.

## 2024-04-19 NOTE — ASSESSMENT & PLAN NOTE
Patient's anemia is currently uncontrolled. Has not received any PRBCs to date. Etiology likely d/t chronic disease due to kwa shiorkor and chronic inflammation from wounds.    Current CBC reviewed-   Lab Results   Component Value Date    HGB 6.2 (L) 04/19/2024    HCT 20.8 (L) 04/19/2024     Monitor serial CBC and transfuse if patient becomes hemodynamically unstable, symptomatic or H/H drops below 7/21.    Patient has been T&S and can be transfused today on HD.   She has had multiple transfusion and I will add anemia panel to her previous labs.    4/19 chronic anemia with her CKD, however acute blood loss is causing a worsening of her anemia now.  Transfuse today

## 2024-04-19 NOTE — PROGRESS NOTES
[facility] - [address] [place] [address] Medicine  Progress Note    Patient Name: Madhav Correa  MRN: [id number]  Patient Class: IP- Inpatient   Admission Date: 4/17/2024  Length of Stay: 1 days  Attending Physician: Elvis Ndiaye DO  Primary Care Provider: Rehab, [facility]        Subjective:     Principal Problem:Calciphylaxis with nonhealing ulcer of leg        HPI:  34 yo F presents to [facility] ED from [facility] for worsening wounds.  Patient is severely debilitated due to SLE.  She was diagnosed five years ago and has suffered secondary hypertension and  ESRD and is now on HD TTS.  She has also developed severe calciphylaxis during her daily dialysis when she was hospitalized at [facility].  Patient lives in [place] and was transferred for wound care to [facility] because they were one of few nursing homes that would accept HD patients.      This patient has a tragic story and I have read the dc summary from 4/424 regarding the eight week course of thiosulfate and the calciphylaxis becoming worse.  She has developed severe wounds on her legs and buttocks (see photos).  She was on meropenum and levaquin in hospital for MSSA and pseudomonas.  She was discharged to the nursing home with wound care and levaquin.  Patient has worsening wounds and the nursing home sent her to [facility] ED for further evaluation and admission.  Prior to transfer from [facility], the discharging physician and the nephrologist and ID physician spoke to her about considering hospice/palliative care.  Patient was adamant about being a full code.  She has two children (12 yo and 12 yo).  She wants to raise her children.      She states that she is able to stand and walk some with assistance.  She does move her upper extremities and feeds herself but has generalized weakness of all extremities but says she is not familiar with the term transverse myelitis and is not sure why her extremities so  weak (3/5 of all four - able to lift gravity).  She has some atrophy noted but most of her leg problems may be from being bedridden with these wounds.  She has been on steroids previously for lupus flares but not chronically.       Overview/Hospital Course:  4/19 worsening anemia today.  We will transfuse    Interval History:  No acute events overnight    Review of Systems   Constitutional:  Negative for appetite change, chills, fatigue, fever and unexpected weight change.   HENT:  Negative for congestion, mouth sores, nosebleeds, sinus pain, sore throat and trouble swallowing.    Respiratory:  Negative for apnea, cough, chest tightness and shortness of breath.    Cardiovascular:  Negative for chest pain, palpitations and leg swelling.   Gastrointestinal:  Negative for abdominal pain, blood in stool, constipation, diarrhea, nausea and vomiting.   Genitourinary:  Negative for decreased urine volume, difficulty urinating, dysuria and frequency.   Musculoskeletal:  Positive for arthralgias and back pain. Negative for neck pain.   Skin:  Positive for wound. Negative for rash.   Neurological:  Negative for syncope, light-headedness and headaches.   Hematological:  Does not bruise/bleed easily.   Psychiatric/Behavioral:  Negative for agitation, confusion and suicidal ideas.      Objective:     Vital Signs (Most Recent):  Temp: (!) 101.8 °F (38.8 °C) (04/19/24 0632)  Pulse: (!) 120 (04/19/24 0632)  Resp: 18 (04/19/24 0824)  BP: 106/69 (04/19/24 0632)  SpO2: (!) 94 % (04/19/24 0632) Vital Signs (24h Range):  Temp:  [98.3 °F (36.8 °C)-102.6 °F (39.2 °C)] 101.8 °F (38.8 °C)  Pulse:  [110-135] 120  Resp:  [13-27] 18  SpO2:  [93 %-100 %] 94 %  BP: ()/(54-94) 106/69     Weight: 76.6 kg (168 lb 14 oz)  Body mass index is 24.94 kg/m².    Intake/Output Summary (Last 24 hours) at 4/19/2024 1013  Last data filed at 4/19/2024 0000  Gross per 24 hour   Intake 0 ml   Output 2400 ml   Net -2400 ml         Physical Exam  Vitals and  nursing note reviewed. Exam conducted with a chaperone present.   Constitutional:       General: She is not in acute distress.     Appearance: She is ill-appearing. She is not toxic-appearing or diaphoretic.   HENT:      Head: Atraumatic.      Mouth/Throat:      Mouth: Mucous membranes are moist.      Pharynx: Oropharynx is clear.   Eyes:      Conjunctiva/sclera: Conjunctivae normal.      Pupils: Pupils are equal, round, and reactive to light.   Neck:      Vascular: No carotid bruit.   Cardiovascular:      Rate and Rhythm: Regular rhythm. Tachycardia present.      Pulses: Normal pulses.      Heart sounds: Normal heart sounds.      Comments: Bruit auscultated/thrill palpated LUE  Pulmonary:      Effort: Pulmonary effort is normal.      Breath sounds: Normal breath sounds.   Abdominal:      General: Bowel sounds are normal. There is distension.      Palpations: There is shifting dullness and fluid wave.      Tenderness: There is no abdominal tenderness. There is no right CVA tenderness, left CVA tenderness, guarding or rebound.   Musculoskeletal:         General: Deformity present. No tenderness or signs of injury. Normal range of motion.      Cervical back: Neck supple.      Right lower leg: Edema present.      Left lower leg: Edema present.   Skin:     General: Skin is warm and dry.      Capillary Refill: Capillary refill takes less than 2 seconds.      Coloration: Skin is not jaundiced or pale.      Findings: No bruising, lesion or rash.   Neurological:      General: No focal deficit present.      Mental Status: She is alert and oriented to person, place, and time.   Psychiatric:         Mood and Affect: Mood normal. Affect is blunt and flat.             Significant Labs: All pertinent labs within the past 24 hours have been reviewed.    Significant Imaging: I have reviewed all pertinent imaging results/findings within the past 24 hours.    Assessment/Plan:      * Calciphylaxis with nonhealing ulcer of leg  Surgery  has been consulted though not sure this is a surgical problem  Broad spectrum IV abx started and cultures are pending.  Wound care team also consulted,.  Perhaps nephrology could offer some recommendations since they may see this more often in HD.      4/19 if this is true biopsy-proven calciphylaxis her prognosis is grim.  There is no approved treatment for this disorder and the pathophysiology is poorly understood.  Six-month mortality is roughly 50% after diagnosis.  We have surgical pathology pending.  The best we can do at this point is to treat her infected wounds.  Hopefully in her particular case this disease we will be self-limiting.  There was apparently extensive palliative care discussions with the patient while for [facility], however she is decided to continue full therapy.    Acute blood loss anemia  Patient's anemia is currently uncontrolled. Has received 1 units of PRBCs on 4/19 . Etiology likely d/t acute blood loss which was from surgery yesterday  Current CBC reviewed-   Lab Results   Component Value Date    HGB 6.2 (L) 04/19/2024    HCT 20.8 (L) 04/19/2024     Monitor serial CBC and transfuse if patient becomes hemodynamically unstable, symptomatic or H/H drops below 7/21.    Secondary hyperparathyroidism  Appreciate nephrology input.  Continue dialysis per their recommendation      Pyuria  Antibiotic      Sepsis  This patient does have evidence of infective focus  My overall impression is sepsis.  Source: Skin and Soft Tissue (location lower extremities)  Antibiotics given-   Antibiotics (72h ago, onward)      Start     Stop Route Frequency Ordered    04/19/24 0900  metroNIDAZOLE tablet 500 mg         04/26/24 0859 Oral Every 12 hours 04/19/24 0718    04/19/24 0900  mupirocin 2 % ointment         -- Top Daily 04/19/24 0719    04/18/24 1200  piperacillin-tazobactam (ZOSYN) 4.5 g in dextrose 5 % in water (D5W) 100 mL IVPB (MB+)         -- IV Every 12 hours (non-standard times) 04/18/24 6828     04/18/24 0548  vancomycin - pharmacy to dose         -- IV pharmacy to manage frequency 04/18/24 0448          Latest lactate reviewed-  Recent Labs   Lab 04/17/24  2332   LACTATE 0.8     Organ dysfunction indicated by  none    Fluid challenge Not needed - patient is not hypotensive      Post- resuscitation assessment No - Post resuscitation assessment not needed       Will Not start Pressors- Levophed for MAP of 65  Source control achieved by:     4/19 vancomycin and Zosyn.  Monitor for vancomycin toxicity.  Follow up cultures.  Judicious use of IV fluids given her ESRD.    Pressure injury of skin with infection    Wound care    Severe protein-calorie malnutrition  Nutrition consulted. Most recent weight and BMI monitored-     Measurements:  Wt Readings from Last 1 Encounters:   04/19/24 76.6 kg (168 lb 14 oz)   Body mass index is 24.94 kg/m².    Nutrition consult and PAB ordered.    Patient has been screened and assessed by RD.    Malnutrition Type:  Context:    Level:      Malnutrition Characteristic Summary:       Interventions/Recommendations (treatment strategy):  recommend to advance diet as medically appropriate; add Keegan BID      Sacral decubitus ulcer  Previous cultures were pseudomonas and MSSA and treated with meropenum and levaquin.  Patient given zosyn in ED after cultures drawn.  I have added vancomycin.  Current lactic acid is normal and other than tachycardia (which may be related to her anemia) she does not meet sepsis criteria.    4/19 continue antibiotics.  Monitor for vancomycin toxicity    Atypical anxiety disorder  Continue her home buspar and nighttime zyprexa.  Patient appears depressed with flat affect but she assures me she is not and is hopeful that we can help her and for her future.  However, she has been told of her dismal prognosis.  Will not consult psychiatry at this time.  Patient states her condition is controlled on current psychotropics.        Secondary  hypertension  Continue her home antihypertensive medications.    Chronic, controlled. Latest blood pressure and vitals reviewed-     Temp:  [98.3 °F (36.8 °C)-102.6 °F (39.2 °C)]   Pulse:  [110-135]   Resp:  [13-27]   BP: ()/(54-94)   SpO2:  [93 %-100 %] .   Home meds for hypertension were reviewed and noted below.   Hypertension Medications               carvediloL (COREG) 25 MG tablet Take 1 tablet by mouth 2 (two) times daily with meals.    hydrALAZINE (APRESOLINE) 100 MG tablet Take 100 mg by mouth every 8 (eight) hours.    isosorbide mononitrate (IMDUR) 30 MG 24 hr tablet Take 30 mg by mouth once daily.            While in the hospital, will manage blood pressure as follows; Continue home antihypertensive regimen    Will utilize p.r.n. blood pressure medication only if patient's blood pressure greater than 180/110 and she develops symptoms such as worsening chest pain or shortness of breath.     SLE (systemic lupus erythematosus)  Not currently on any biologics.  Has been tried on previous medications.  Has followed with a rheumatologist in [place].        ESRD (end stage renal disease) on dialysis  Creatine stable for now. BMP reviewed- noted Estimated Creatinine Clearance: 19.4 mL/min (A) (based on SCr of 4.3 mg/dL (H)). according to latest data. Based on current GFR, CKD stage is stage 5 - GFR < 15.  Monitor UOP and serial BMP and adjust therapy as needed. Renally dose meds. Avoid nephrotoxic medications and procedures.    HD TTF    GERD (gastroesophageal reflux disease)  Continue OP PPI.        Asthma  PRN bronchodilator  4/19 respiratory status is stable    Anemia in chronic kidney disease  Patient's anemia is currently uncontrolled. Has not received any PRBCs to date. Etiology likely d/t chronic disease due to kwa shiorkor and chronic inflammation from wounds.    Current CBC reviewed-   Lab Results   Component Value Date    HGB 6.2 (L) 04/19/2024    HCT 20.8 (L) 04/19/2024     Monitor serial CBC and  transfuse if patient becomes hemodynamically unstable, symptomatic or H/H drops below 7/21.    Patient has been T&S and can be transfused today on HD.   She has had multiple transfusion and I will add anemia panel to her previous labs.    4/19 chronic anemia with her CKD, however acute blood loss is causing a worsening of her anemia now.  Transfuse today      VTE Risk Mitigation (From admission, onward)           Ordered     heparin (porcine) injection 5,000 Units  Every 12 hours         04/18/24 9936                    Discharge Planning   ESTHER:      Code Status: Full Code   Is the patient medically ready for discharge?:     Reason for patient still in hospital (select all that apply): Treatment  Discharge Plan A: Skilled Nursing Facility        Labs, consultant notes reviewed.  Continue antibiotics.  Monitor for vancomycin toxicity          Elvis Ndiaye DO  Department of Hospital Medicine   [facility] - [facility] Tele[facility]

## 2024-04-19 NOTE — ASSESSMENT & PLAN NOTE
Previous cultures were pseudomonas and MSSA and treated with meropenum and levaquin.  Patient given zosyn in ED after cultures drawn.  I have added vancomycin.  Current lactic acid is normal and other than tachycardia (which may be related to her anemia) she does not meet sepsis criteria.    4/19 continue antibiotics.  Monitor for vancomycin toxicity

## 2024-04-19 NOTE — HOSPITAL COURSE
33 year old female with an extensive PMH presents with calciphylaxis and is ESRD.  Patient denies chest pain, shortness of breath, nausea, vomiting, or diarrhea.    04/27 Records reviewed. Seen in dialysis. Been on dialysis 2 yrs. Just DC from [facility] one month earlier after 52 day stay.  Wounds to legs and sacral area in last couple months. Concern calciphylaxis. Had debridement area x 2.   PMHx: SLE, ESRD TTS hemodialysis (access AV fistula left arm), GERD. Discuss with surgery. Continue current ATB. Increase activity when OK with surgery. River in secondary to wounds.  04/28 no new issues.  Continue current therapy.  Discussed with surgery.  04/29 Looks OK. Discussed with Dr Gage and no deep wound infection. Needs topical / wound care and to f/u outpt at wound care center. Getting off ATB will help with resistant organisms. Back to NH tomorrow after dialysis. Discussed with Dr Crawford.  04/30 patient has undergone dialysis today.  Will plan to discharge back to nursing home.  Continue with aggressive wound care both at nursing home and also outpatient [facility].  Continue with dialysis as previous TTS.  Discharged

## 2024-04-19 NOTE — PLAN OF CARE
Problem: Skin Injury Risk Increased  Goal: Skin Health and Integrity  Outcome: Ongoing, Progressing     Problem: Adult Inpatient Plan of Care  Goal: Plan of Care Review  Outcome: Ongoing, Progressing  Goal: Patient-Specific Goal (Individualized)  Outcome: Ongoing, Progressing  Goal: Absence of Hospital-Acquired Illness or Injury  Outcome: Ongoing, Progressing  Goal: Optimal Comfort and Wellbeing  Outcome: Ongoing, Progressing  Goal: Readiness for Transition of Care  Outcome: Ongoing, Progressing     Problem: Infection  Goal: Absence of Infection Signs and Symptoms  Outcome: Ongoing, Progressing     Problem: Adjustment to Illness (Sepsis/Septic Shock)  Goal: Optimal Coping  Outcome: Ongoing, Progressing

## 2024-04-19 NOTE — ASSESSMENT & PLAN NOTE
Not currently on any biologics.  Has been tried on previous medications.  Has followed with a rheumatologist in [place].

## 2024-04-19 NOTE — PLAN OF CARE
Problem: Adjustment to Illness (Sepsis/Septic Shock)  Goal: Optimal Coping  Outcome: Ongoing, Progressing     Problem: Adjustment to Illness (Sepsis/Septic Shock)  Goal: Optimal Coping  Outcome: Ongoing, Progressing     Problem: Infection  Goal: Absence of Infection Signs and Symptoms  Outcome: Ongoing, Progressing  Intervention: Prevent or Manage Infection  Flowsheets (Taken 4/19/2024 9375)  Infection Management: aseptic technique maintained  Isolation Precautions: precautions maintained

## 2024-04-19 NOTE — ASSESSMENT & PLAN NOTE
Patient's anemia is currently uncontrolled. Has received 1 units of PRBCs on 4/19 . Etiology likely d/t acute blood loss which was from surgery yesterday  Current CBC reviewed-   Lab Results   Component Value Date    HGB 6.2 (L) 04/19/2024    HCT 20.8 (L) 04/19/2024     Monitor serial CBC and transfuse if patient becomes hemodynamically unstable, symptomatic or H/H drops below 7/21.

## 2024-04-19 NOTE — ASSESSMENT & PLAN NOTE
Nutrition consulted. Most recent weight and BMI monitored-     Measurements:  Wt Readings from Last 1 Encounters:   04/19/24 76.6 kg (168 lb 14 oz)   Body mass index is 24.94 kg/m².    Nutrition consult and PAB ordered.    Patient has been screened and assessed by RD.    Malnutrition Type:  Context:    Level:      Malnutrition Characteristic Summary:       Interventions/Recommendations (treatment strategy):  recommend to advance diet as medically appropriate; add Keegan BID

## 2024-04-20 LAB
ABO + RH BLD: NORMAL
ALBUMIN SERPL BCP-MCNC: 1.5 G/DL (ref 3.5–5)
ANION GAP SERPL CALCULATED.3IONS-SCNC: 11 MMOL/L (ref 7–16)
BASOPHILS # BLD AUTO: 0.04 K/UL (ref 0–0.2)
BASOPHILS NFR BLD AUTO: 0.4 % (ref 0–1)
BLD PROD TYP BPU: NORMAL
BLOOD UNIT EXPIRATION DATE: NORMAL
BLOOD UNIT TYPE CODE: 6200
BUN SERPL-MCNC: 36 MG/DL (ref 7–18)
BUN/CREAT SERPL: 7 (ref 6–20)
CALCIUM SERPL-MCNC: 8.1 MG/DL (ref 8.5–10.1)
CHLORIDE SERPL-SCNC: 105 MMOL/L (ref 98–107)
CO2 SERPL-SCNC: 25 MMOL/L (ref 21–32)
CREAT SERPL-MCNC: 5.08 MG/DL (ref 0.55–1.02)
CROSSMATCH INTERPRETATION: NORMAL
DIFFERENTIAL METHOD BLD: ABNORMAL
DISPENSE STATUS: NORMAL
EGFR (NO RACE VARIABLE) (RUSH/TITUS): 11 ML/MIN/1.73M2
EOSINOPHIL # BLD AUTO: 0.58 K/UL (ref 0–0.5)
EOSINOPHIL NFR BLD AUTO: 5.2 % (ref 1–4)
ERYTHROCYTE [DISTWIDTH] IN BLOOD BY AUTOMATED COUNT: 19.9 % (ref 11.5–14.5)
GLUCOSE SERPL-MCNC: 99 MG/DL (ref 74–106)
HAV IGM SER QL: NORMAL
HBV CORE IGM SER QL: NORMAL
HBV SURFACE AG SERPL QL IA: NORMAL
HCT VFR BLD AUTO: 23 % (ref 38–47)
HCV AB SER QL: NORMAL
HGB BLD-MCNC: 6.8 G/DL (ref 12–16)
IMM GRANULOCYTES # BLD AUTO: 0.1 K/UL (ref 0–0.04)
IMM GRANULOCYTES NFR BLD: 0.9 % (ref 0–0.4)
LYMPHOCYTES # BLD AUTO: 2.48 K/UL (ref 1–4.8)
LYMPHOCYTES NFR BLD AUTO: 22.3 % (ref 27–41)
MCH RBC QN AUTO: 27.6 PG (ref 27–31)
MCHC RBC AUTO-ENTMCNC: 29.6 G/DL (ref 32–36)
MCV RBC AUTO: 93.5 FL (ref 80–96)
MONOCYTES # BLD AUTO: 1.61 K/UL (ref 0–0.8)
MONOCYTES NFR BLD AUTO: 14.5 % (ref 2–6)
MPC BLD CALC-MCNC: 9.6 FL (ref 9.4–12.4)
NEUTROPHILS # BLD AUTO: 6.33 K/UL (ref 1.8–7.7)
NEUTROPHILS NFR BLD AUTO: 56.7 % (ref 53–65)
NRBC # BLD AUTO: 0 X10E3/UL
NRBC, AUTO (.00): 0 %
PHOSPHATE SERPL-MCNC: 2.4 MG/DL (ref 2.5–4.5)
PLATELET # BLD AUTO: 176 K/UL (ref 150–400)
POTASSIUM SERPL-SCNC: 5 MMOL/L (ref 3.5–5.1)
RBC # BLD AUTO: 2.46 M/UL (ref 4.2–5.4)
SODIUM SERPL-SCNC: 136 MMOL/L (ref 136–145)
UNIT NUMBER: NORMAL
WBC # BLD AUTO: 11.14 K/UL (ref 4.5–11)

## 2024-04-20 PROCEDURE — 25000003 PHARM REV CODE 250: Performed by: FAMILY MEDICINE

## 2024-04-20 PROCEDURE — 63600175 PHARM REV CODE 636 W HCPCS: Performed by: STUDENT IN AN ORGANIZED HEALTH CARE EDUCATION/TRAINING PROGRAM

## 2024-04-20 PROCEDURE — 25000003 PHARM REV CODE 250: Performed by: STUDENT IN AN ORGANIZED HEALTH CARE EDUCATION/TRAINING PROGRAM

## 2024-04-20 PROCEDURE — 63600175 PHARM REV CODE 636 W HCPCS: Mod: TB | Performed by: INTERNAL MEDICINE

## 2024-04-20 PROCEDURE — 90935 HEMODIALYSIS ONE EVALUATION: CPT

## 2024-04-20 PROCEDURE — 63600175 PHARM REV CODE 636 W HCPCS: Performed by: HOSPITALIST

## 2024-04-20 PROCEDURE — 99232 SBSQ HOSP IP/OBS MODERATE 35: CPT | Mod: ,,, | Performed by: FAMILY MEDICINE

## 2024-04-20 PROCEDURE — 80074 ACUTE HEPATITIS PANEL: CPT | Performed by: INTERNAL MEDICINE

## 2024-04-20 PROCEDURE — P9016 RBC LEUKOCYTES REDUCED: HCPCS | Performed by: STUDENT IN AN ORGANIZED HEALTH CARE EDUCATION/TRAINING PROGRAM

## 2024-04-20 PROCEDURE — 30233N1 TRANSFUSION OF NONAUTOLOGOUS RED BLOOD CELLS INTO PERIPHERAL VEIN, PERCUTANEOUS APPROACH: ICD-10-PCS | Performed by: INTERNAL MEDICINE

## 2024-04-20 PROCEDURE — 80069 RENAL FUNCTION PANEL: CPT | Performed by: STUDENT IN AN ORGANIZED HEALTH CARE EDUCATION/TRAINING PROGRAM

## 2024-04-20 PROCEDURE — 25000003 PHARM REV CODE 250: Performed by: HOSPITALIST

## 2024-04-20 PROCEDURE — 85025 COMPLETE CBC W/AUTO DIFF WBC: CPT | Performed by: STUDENT IN AN ORGANIZED HEALTH CARE EDUCATION/TRAINING PROGRAM

## 2024-04-20 PROCEDURE — 11000001 HC ACUTE MED/SURG PRIVATE ROOM

## 2024-04-20 PROCEDURE — 25000003 PHARM REV CODE 250: Performed by: INTERNAL MEDICINE

## 2024-04-20 PROCEDURE — 63600175 PHARM REV CODE 636 W HCPCS: Performed by: FAMILY MEDICINE

## 2024-04-20 RX ORDER — BISACODYL 10 MG/1
10 SUPPOSITORY RECTAL DAILY PRN
Status: DISCONTINUED | OUTPATIENT
Start: 2024-04-20 | End: 2024-04-30 | Stop reason: HOSPADM

## 2024-04-20 RX ADMIN — BUPROPION HYDROCHLORIDE 75 MG: 75 TABLET, FILM COATED ORAL at 08:04

## 2024-04-20 RX ADMIN — VANCOMYCIN HYDROCHLORIDE 1500 MG: 1 INJECTION, POWDER, LYOPHILIZED, FOR SOLUTION INTRAVENOUS at 06:04

## 2024-04-20 RX ADMIN — OXYCODONE 15 MG: 5 TABLET ORAL at 10:04

## 2024-04-20 RX ADMIN — SENNOSIDES AND DOCUSATE SODIUM 2 TABLET: 8.6; 5 TABLET ORAL at 08:04

## 2024-04-20 RX ADMIN — SODIUM CHLORIDE: 9 INJECTION, SOLUTION INTRAVENOUS at 09:04

## 2024-04-20 RX ADMIN — ISOSORBIDE MONONITRATE 30 MG: 30 TABLET, EXTENDED RELEASE ORAL at 08:04

## 2024-04-20 RX ADMIN — CARVEDILOL 25 MG: 25 TABLET, FILM COATED ORAL at 05:04

## 2024-04-20 RX ADMIN — MORPHINE SULFATE 4 MG: 4 INJECTION, SOLUTION INTRAMUSCULAR; INTRAVENOUS at 04:04

## 2024-04-20 RX ADMIN — MORPHINE SULFATE 4 MG: 4 INJECTION, SOLUTION INTRAMUSCULAR; INTRAVENOUS at 06:04

## 2024-04-20 RX ADMIN — HEPARIN SODIUM 5000 UNITS: 5000 INJECTION, SOLUTION INTRAVENOUS; SUBCUTANEOUS at 09:04

## 2024-04-20 RX ADMIN — SEVELAMER CARBONATE 1600 MG: 800 TABLET, FILM COATED ORAL at 05:04

## 2024-04-20 RX ADMIN — CARVEDILOL 25 MG: 25 TABLET, FILM COATED ORAL at 08:04

## 2024-04-20 RX ADMIN — ACETAMINOPHEN 1000 MG: 500 TABLET ORAL at 05:04

## 2024-04-20 RX ADMIN — SEVELAMER CARBONATE 1600 MG: 800 TABLET, FILM COATED ORAL at 08:04

## 2024-04-20 RX ADMIN — MORPHINE SULFATE 4 MG: 4 INJECTION, SOLUTION INTRAMUSCULAR; INTRAVENOUS at 12:04

## 2024-04-20 RX ADMIN — SODIUM THIOSULFATE 25 G: 250 INJECTION, SOLUTION INTRAVENOUS at 11:04

## 2024-04-20 RX ADMIN — OXYCODONE 15 MG: 5 TABLET ORAL at 02:04

## 2024-04-20 RX ADMIN — FAMOTIDINE 20 MG: 20 TABLET ORAL at 08:04

## 2024-04-20 RX ADMIN — HEPARIN SODIUM 5000 UNITS: 5000 INJECTION, SOLUTION INTRAVENOUS; SUBCUTANEOUS at 08:04

## 2024-04-20 RX ADMIN — MUPIROCIN: 20 OINTMENT TOPICAL at 08:04

## 2024-04-20 RX ADMIN — MEROPENEM 500 MG: 500 INJECTION, POWDER, FOR SOLUTION INTRAVENOUS at 05:04

## 2024-04-20 RX ADMIN — ACETAMINOPHEN 1000 MG: 500 TABLET ORAL at 08:04

## 2024-04-20 RX ADMIN — METRONIDAZOLE 500 MG: 500 TABLET ORAL at 08:04

## 2024-04-20 RX ADMIN — SEVELAMER CARBONATE 1600 MG: 800 TABLET, FILM COATED ORAL at 12:04

## 2024-04-20 RX ADMIN — OLANZAPINE 5 MG: 5 TABLET, FILM COATED ORAL at 08:04

## 2024-04-20 RX ADMIN — SILVER SULFADIAZINE: 10 CREAM TOPICAL at 08:04

## 2024-04-20 RX ADMIN — OXYCODONE 15 MG: 5 TABLET ORAL at 08:04

## 2024-04-20 NOTE — ASSESSMENT & PLAN NOTE
Patient's anemia is currently uncontrolled. Has not received any PRBCs to date. Etiology likely d/t chronic disease due to kwa shiorkor and chronic inflammation from wounds.    Current CBC reviewed-   Lab Results   Component Value Date    HGB 6.8 (L) 04/20/2024    HCT 23.0 (L) 04/20/2024     Monitor serial CBC and transfuse if patient becomes hemodynamically unstable, symptomatic or H/H drops below 7/21.    Patient has been T&S and can be transfused today on HD.   She has had multiple transfusion and I will add anemia panel to her previous labs.    4/19 chronic anemia with her CKD, however acute blood loss is causing a worsening of her anemia now.  4/20- Transfuse during dialysis today.

## 2024-04-20 NOTE — PROGRESS NOTES
Pharmacokinetic Assessment Follow Up: IV Vancomycin    Vancomycin serum concentration assessment(s):    The random level was drawn correctly and can be used to guide therapy at this time. The measurement is within the desired definitive target range of 10 to 20 mcg/mL.    Vancomycin Regimen Plan:    Vanc 1500 mg IV x 1 today with a random vanc level ordered for 4/25 at 0600    Drug levels (last 3 results):  Recent Labs   Lab Result Units 04/19/24  0511   Vancomycin, Random µg/mL 15.1       Pharmacy will continue to follow and monitor vancomycin.    Please contact pharmacy at extension [phone] for questions regarding this assessment.    Patient brief summary:  Madhav Correa is a 33 y.o. female initiated on antimicrobial therapy with IV Vancomycin for treatment of skin & soft tissue infection    Drug Allergies:   Review of patient's allergies indicates:   Allergen Reactions    Iodine Swelling     Causes swelling, itching , and nausea.       Actual Body Weight:   76.6 kg    Renal Function:   Estimated Creatinine Clearance: 16.5 mL/min (A) (based on SCr of 5.08 mg/dL (H)).,     Dialysis Method (if applicable):  intermittent HD    CBC (last 72 hours):  Recent Labs   Lab Result Units 04/17/24  2245 04/19/24  0743 04/19/24  1528 04/20/24  0544   WBC K/uL 8.88 9.31  --  11.14*   Hemoglobin g/dL 6.7* 6.2* 7.0* 6.8*   Hematocrit % 22.2* 20.8* 23.1* 23.0*   Platelet Count K/uL 210 173  --  176   Lymphocytes % % 20.7* 19.5*  --  22.3*   Monocytes % % 18.4* 15.7*  --  14.5*   Eosinophils % % 3.9 3.3  --  5.2*   Basophils % % 0.2 0.1  --  0.4   Diff Type  Auto Auto  --  Auto       Metabolic Panel (last 72 hours):  Recent Labs   Lab Result Units 04/18/24  0024 04/18/24  0406 04/19/24  0743 04/20/24  0429   Sodium mmol/L 136  --  137 136   Potassium mmol/L 4.5  --  4.6 5.0   Chloride mmol/L 100  --  105 105   CO2 mmol/L 30  --  27 25   Glucose mg/dL 90  --  132* 99   Glucose, UA mg/dL  --  Normal  --   --    BUN mg/dL 41*  --   30* 36*   Creatinine mg/dL 4.98*  --  4.30* 5.08*   Albumin g/dL 1.9*  --  1.7* 1.5*   Bilirubin, Total mg/dL 0.5  --   --   --    Alk Phos U/L 72  --   --   --    AST U/L <3*  --   --   --    ALT U/L <6*  --   --   --    Phosphorus mg/dL  --   --  2.2* 2.4*       Vancomycin Administrations:  vancomycin given in the last 96 hours                     vancomycin (VANCOCIN) 1,250 mg in dextrose 5 % (D5W) 250 mL IVPB (mg) 1,250 mg New Bag 04/18/24 0543                    Microbiologic Results:  Microbiology Results (last 7 days)       Procedure Component Value Units Date/Time    Culture, Wound [[id number]]  (Abnormal)  (Susceptibility) Collected: 04/17/24 2330    Order Status: Completed Specimen: Wound from Leg, Left Updated: 04/20/24 0934     Culture, Wound/Abscess Moderate Growth Pseudomonas aeruginosa     Comment: Susceptibility To Follow         Moderate Growth Citrobacter werkmanii    Blood culture x two cultures. Draw prior to antibiotics. [[id number]] Collected: 04/17/24 2327    Order Status: Completed Specimen: Blood Updated: 04/20/24 0834     Culture, Blood No Growth At 48 Hours    Blood culture x two cultures. Draw prior to antibiotics. [[id number]] Collected: 04/17/24 2332    Order Status: Completed Specimen: Blood Updated: 04/20/24 0834     Culture, Blood No Growth At 48 Hours    Urine culture [[id number]]  (Abnormal) Collected: 04/18/24 0406    Order Status: Completed Specimen: Urine, Clean Catch Updated: 04/19/24 0921     Culture, Urine >100,000 Coagulase-negative Staphylococcus species     Comment: Identification and Susceptibility to Follow       Influenza A & B by Molecular [[id number]]  (Normal) Collected: 04/18/24 0010    Order Status: Completed Specimen: Nasopharyngeal Swab Updated: 04/18/24 0042     INFLUENZA A MOLECULAR Negative     INFLUENZA B MOLECULAR  Negative

## 2024-04-20 NOTE — PLAN OF CARE
Problem: Skin Injury Risk Increased  Goal: Skin Health and Integrity  Outcome: Ongoing, Progressing     Problem: Adult Inpatient Plan of Care  Goal: Plan of Care Review  Outcome: Ongoing, Progressing  Goal: Patient-Specific Goal (Individualized)  Outcome: Ongoing, Progressing  Goal: Absence of Hospital-Acquired Illness or Injury  Outcome: Ongoing, Progressing  Goal: Optimal Comfort and Wellbeing  Outcome: Ongoing, Progressing  Goal: Readiness for Transition of Care  Outcome: Ongoing, Progressing     Problem: Infection  Goal: Absence of Infection Signs and Symptoms  Outcome: Ongoing, Progressing     Problem: Adjustment to Illness (Sepsis/Septic Shock)  Goal: Optimal Coping  Outcome: Ongoing, Progressing     Problem: Bleeding (Sepsis/Septic Shock)  Goal: Absence of Bleeding  Outcome: Ongoing, Progressing

## 2024-04-20 NOTE — ASSESSMENT & PLAN NOTE
Surgery has been consulted though not sure this is a surgical problem  Broad spectrum IV abx started and cultures are pending.  Wound care team also consulted,.  Perhaps nephrology could offer some recommendations since they may see this more often in HD.      4/19 if this is true biopsy-proven calciphylaxis her prognosis is grim.  There is no approved treatment for this disorder and the pathophysiology is poorly understood.  Six-month mortality is roughly 50% after diagnosis.  We have surgical pathology pending.  The best we can do at this point is to treat her infected wounds.  Hopefully in her particular case this disease we will be self-limiting.  There was apparently extensive palliative care discussions with the patient while for [facility], however she is decided to continue full therapy.    4/20- wound cultures positive for Citrobacter which is sensitive to Merrem and resistant to Zosyn.  We will change antibiotics today.  Blood culture remain negative.

## 2024-04-20 NOTE — PROGRESS NOTES
[facility] - [address] [place] [address] Medicine  Progress Note    Patient Name: Madhav Correa  MRN: [id number]  Patient Class: IP- Inpatient   Admission Date: 4/17/2024  Length of Stay: 2 days  Attending Physician: Luis Alfredo Valentin DO  Primary Care Provider: Rehab, [facility]        Subjective:     Principal Problem:Calciphylaxis with nonhealing ulcer of leg        HPI:  32 yo F presents to [facility] ED from [facility] for worsening wounds.  Patient is severely debilitated due to SLE.  She was diagnosed five years ago and has suffered secondary hypertension and  ESRD and is now on HD TTS.  She has also developed severe calciphylaxis during her daily dialysis when she was hospitalized at [facility].  Patient lives in [place] and was transferred for wound care to [place] because they were one of few nursing homes that would accept HD patients.      This patient has a tragic story and I have read the dc summary from 4/424 regarding the eight week course of thiosulfate and the calciphylaxis becoming worse.  She has developed severe wounds on her legs and buttocks (see photos).  She was on meropenum and levaquin in hospital for MSSA and pseudomonas.  She was discharged to the nursing home with wound care and levaquin.  Patient has worsening wounds and the nursing home sent her to [facility] ED for further evaluation and admission.  Prior to transfer from [facility], the discharging physician and the nephrologist and ID physician spoke to her about considering hospice/palliative care.  Patient was adamant about being a full code.  She has two children (14 yo and 12 yo).  She wants to raise her children.      She states that she is able to stand and walk some with assistance.  She does move her upper extremities and feeds herself but has generalized weakness of all extremities but says she is not familiar with the term transverse myelitis and is not sure why her extremities so  weak (3/5 of all four - able to lift gravity).  She has some atrophy noted but most of her leg problems may be from being bedridden with these wounds.  She has been on steroids previously for lupus flares but not chronically.       Overview/Hospital Course:  4/19 worsening anemia today.  We will transfuse  4/20- the patient was seen examined today resting comfortably in bed during dialysis.  She has been transfused 1 unit packed red blood cells secondary to a low H&H of 6.823.  We will recheck H&H post transfusion.  Also plan to insert maintain urinary River catheter secondary to extensive wounds of the proximal lower extremities and sacrum.    No new subjective & objective note has been filed under this hospital service since the last note was generated.      Assessment/Plan:      * Calciphylaxis with nonhealing ulcer of leg  Surgery has been consulted though not sure this is a surgical problem  Broad spectrum IV abx started and cultures are pending.  Wound care team also consulted,.  Perhaps nephrology could offer some recommendations since they may see this more often in HD.      4/19 if this is true biopsy-proven calciphylaxis her prognosis is grim.  There is no approved treatment for this disorder and the pathophysiology is poorly understood.  Six-month mortality is roughly 50% after diagnosis.  We have surgical pathology pending.  The best we can do at this point is to treat her infected wounds.  Hopefully in her particular case this disease we will be self-limiting.  There was apparently extensive palliative care discussions with the patient while for [facility], however she is decided to continue full therapy.    4/20- wound cultures positive for Citrobacter which is sensitive to Merrem and resistant to Zosyn.  We will change antibiotics today.  Blood culture remain negative.    Acute blood loss anemia  Patient's anemia is currently uncontrolled. Has received 1 units of PRBCs on 4/19 . Etiology likely  d/t acute blood loss which was from surgery yesterday  Current CBC reviewed-   Lab Results   Component Value Date    HGB 6.2 (L) 04/19/2024    HCT 20.8 (L) 04/19/2024     Monitor serial CBC and transfuse if patient becomes hemodynamically unstable, symptomatic or H/H drops below 7/21.    Secondary hyperparathyroidism  Appreciate nephrology input.  Continue dialysis per their recommendation      Pyuria  Antibiotic      Sepsis  This patient does have evidence of infective focus  My overall impression is sepsis.  Source: Skin and Soft Tissue (location lower extremities)  Antibiotics given-   Antibiotics (72h ago, onward)      Start     Stop Route Frequency Ordered    04/19/24 0900  metroNIDAZOLE tablet 500 mg         04/26/24 0859 Oral Every 12 hours 04/19/24 0718    04/19/24 0900  mupirocin 2 % ointment         -- Top Daily 04/19/24 0719    04/18/24 1200  piperacillin-tazobactam (ZOSYN) 4.5 g in dextrose 5 % in water (D5W) 100 mL IVPB (MB+)         -- IV Every 12 hours (non-standard times) 04/18/24 0426    04/18/24 0548  vancomycin - pharmacy to dose         -- IV pharmacy to manage frequency 04/18/24 0448          Latest lactate reviewed-  Recent Labs   Lab 04/17/24  2332   LACTATE 0.8     Organ dysfunction indicated by  none    Fluid challenge Not needed - patient is not hypotensive      Post- resuscitation assessment No - Post resuscitation assessment not needed       Will Not start Pressors- Levophed for MAP of 65  Source control achieved by:     4/19 vancomycin and Zosyn.  Monitor for vancomycin toxicity.  Follow up cultures.  Judicious use of IV fluids given her ESRD.    Pressure injury of skin with infection    Wound care    Severe protein-calorie malnutrition  Nutrition consulted. Most recent weight and BMI monitored-     Measurements:  Wt Readings from Last 1 Encounters:   04/19/24 76.6 kg (168 lb 14 oz)   Body mass index is 24.94 kg/m².    Nutrition consult and PAB ordered.    Patient has been screened and  assessed by RD.    Malnutrition Type:  Context:    Level:      Malnutrition Characteristic Summary:       Interventions/Recommendations (treatment strategy):  recommend to advance diet as medically appropriate; add Keegan BID      Sacral decubitus ulcer  Previous cultures were pseudomonas and MSSA and treated with meropenum and levaquin.  Patient given zosyn in ED after cultures drawn.  I have added vancomycin.  Current lactic acid is normal and other than tachycardia (which may be related to her anemia) she does not meet sepsis criteria.    4/19 continue antibiotics.  Monitor for vancomycin toxicity  4/20- insert maintain urinary River catheter secondary to wounds of the proximal lower extremities and sacrum.    Atypical anxiety disorder  Continue her home buspar and nighttime zyprexa.  Patient appears depressed with flat affect but she assures me she is not and is hopeful that we can help her and for her future.  However, she has been told of her dismal prognosis.  Will not consult psychiatry at this time.  Patient states her condition is controlled on current psychotropics.        Secondary hypertension  Continue her home antihypertensive medications.    Chronic, controlled. Latest blood pressure and vitals reviewed-     Temp:  [98.3 °F (36.8 °C)-102.6 °F (39.2 °C)]   Pulse:  [110-135]   Resp:  [13-27]   BP: ()/(54-94)   SpO2:  [93 %-100 %] .   Home meds for hypertension were reviewed and noted below.   Hypertension Medications               carvediloL (COREG) 25 MG tablet Take 1 tablet by mouth 2 (two) times daily with meals.    hydrALAZINE (APRESOLINE) 100 MG tablet Take 100 mg by mouth every 8 (eight) hours.    isosorbide mononitrate (IMDUR) 30 MG 24 hr tablet Take 30 mg by mouth once daily.            While in the hospital, will manage blood pressure as follows; Continue home antihypertensive regimen    Will utilize p.r.n. blood pressure medication only if patient's blood pressure greater than 180/110  and she develops symptoms such as worsening chest pain or shortness of breath.     SLE (systemic lupus erythematosus)  Not currently on any biologics.  Has been tried on previous medications.  Has followed with a rheumatologist in [place].        ESRD (end stage renal disease) on dialysis  Creatine stable for now. BMP reviewed- noted Estimated Creatinine Clearance: 19.4 mL/min (A) (based on SCr of 4.3 mg/dL (H)). according to latest data. Based on current GFR, CKD stage is stage 5 - GFR < 15.  Monitor UOP and serial BMP and adjust therapy as needed. Renally dose meds. Avoid nephrotoxic medications and procedures.    HD TTF    GERD (gastroesophageal reflux disease)  Continue OP PPI.        Asthma  PRN bronchodilator  4/19 respiratory status is stable    Anemia in chronic kidney disease  Patient's anemia is currently uncontrolled. Has not received any PRBCs to date. Etiology likely d/t chronic disease due to kwa shiorkor and chronic inflammation from wounds.    Current CBC reviewed-   Lab Results   Component Value Date    HGB 6.8 (L) 04/20/2024    HCT 23.0 (L) 04/20/2024     Monitor serial CBC and transfuse if patient becomes hemodynamically unstable, symptomatic or H/H drops below 7/21.    Patient has been T&S and can be transfused today on HD.   She has had multiple transfusion and I will add anemia panel to her previous labs.    4/19 chronic anemia with her CKD, however acute blood loss is causing a worsening of her anemia now.  4/20- Transfuse during dialysis today.      VTE Risk Mitigation (From admission, onward)           Ordered     heparin (porcine) injection 5,000 Units  Every 12 hours         04/18/24 0428                    Discharge Planning   ESTHER: 4/25/2024     Code Status: Full Code   Is the patient medically ready for discharge?:     Reason for patient still in hospital (select all that apply): Treatment  Discharge Plan A: Skilled Nursing Facility                  Luis Alfredo Valentin DO  Department of  [facility] Medicine   [facility] - [address] [place] [address]

## 2024-04-20 NOTE — ASSESSMENT & PLAN NOTE
Previous cultures were pseudomonas and MSSA and treated with meropenum and levaquin.  Patient given zosyn in ED after cultures drawn.  I have added vancomycin.  Current lactic acid is normal and other than tachycardia (which may be related to her anemia) she does not meet sepsis criteria.    4/19 continue antibiotics.  Monitor for vancomycin toxicity  4/20- insert maintain urinary River catheter secondary to wounds of the proximal lower extremities and sacrum.

## 2024-04-21 LAB
ALBUMIN SERPL BCP-MCNC: 1.7 G/DL (ref 3.5–5)
ANION GAP SERPL CALCULATED.3IONS-SCNC: 9 MMOL/L (ref 7–16)
BASOPHILS # BLD AUTO: 0.03 K/UL (ref 0–0.2)
BASOPHILS NFR BLD AUTO: 0.3 % (ref 0–1)
BUN SERPL-MCNC: 24 MG/DL (ref 7–18)
BUN/CREAT SERPL: 6 (ref 6–20)
CALCIUM SERPL-MCNC: 8.6 MG/DL (ref 8.5–10.1)
CHLORIDE SERPL-SCNC: 105 MMOL/L (ref 98–107)
CO2 SERPL-SCNC: 26 MMOL/L (ref 21–32)
CREAT SERPL-MCNC: 3.87 MG/DL (ref 0.55–1.02)
DIFFERENTIAL METHOD BLD: ABNORMAL
EGFR (NO RACE VARIABLE) (RUSH/TITUS): 15 ML/MIN/1.73M2
EOSINOPHIL # BLD AUTO: 0.53 K/UL (ref 0–0.5)
EOSINOPHIL NFR BLD AUTO: 5.7 % (ref 1–4)
ERYTHROCYTE [DISTWIDTH] IN BLOOD BY AUTOMATED COUNT: 20 % (ref 11.5–14.5)
GLUCOSE SERPL-MCNC: 109 MG/DL (ref 74–106)
HCT VFR BLD AUTO: 24.7 % (ref 38–47)
HGB BLD-MCNC: 7.5 G/DL (ref 12–16)
IMM GRANULOCYTES # BLD AUTO: 0.04 K/UL (ref 0–0.04)
IMM GRANULOCYTES NFR BLD: 0.4 % (ref 0–0.4)
LYMPHOCYTES # BLD AUTO: 2.28 K/UL (ref 1–4.8)
LYMPHOCYTES NFR BLD AUTO: 24.6 % (ref 27–41)
MCH RBC QN AUTO: 27.9 PG (ref 27–31)
MCHC RBC AUTO-ENTMCNC: 30.4 G/DL (ref 32–36)
MCV RBC AUTO: 91.8 FL (ref 80–96)
MICROORGANISM SPEC CULT: ABNORMAL
MICROORGANISM SPEC CULT: ABNORMAL
MONOCYTES # BLD AUTO: 1.7 K/UL (ref 0–0.8)
MONOCYTES NFR BLD AUTO: 18.3 % (ref 2–6)
MPC BLD CALC-MCNC: 9.6 FL (ref 9.4–12.4)
NEUTROPHILS # BLD AUTO: 4.69 K/UL (ref 1.8–7.7)
NEUTROPHILS NFR BLD AUTO: 50.7 % (ref 53–65)
NRBC # BLD AUTO: 0 X10E3/UL
NRBC, AUTO (.00): 0 %
PHOSPHATE SERPL-MCNC: 2.2 MG/DL (ref 2.5–4.5)
PLATELET # BLD AUTO: 190 K/UL (ref 150–400)
POTASSIUM SERPL-SCNC: 4.2 MMOL/L (ref 3.5–5.1)
RBC # BLD AUTO: 2.69 M/UL (ref 4.2–5.4)
SODIUM SERPL-SCNC: 136 MMOL/L (ref 136–145)
UA COMPLETE W REFLEX CULTURE PNL UR: ABNORMAL
WBC # BLD AUTO: 9.27 K/UL (ref 4.5–11)

## 2024-04-21 PROCEDURE — 25000003 PHARM REV CODE 250: Performed by: FAMILY MEDICINE

## 2024-04-21 PROCEDURE — 99232 SBSQ HOSP IP/OBS MODERATE 35: CPT | Mod: ,,, | Performed by: FAMILY MEDICINE

## 2024-04-21 PROCEDURE — 63600175 PHARM REV CODE 636 W HCPCS: Performed by: STUDENT IN AN ORGANIZED HEALTH CARE EDUCATION/TRAINING PROGRAM

## 2024-04-21 PROCEDURE — 80069 RENAL FUNCTION PANEL: CPT | Performed by: STUDENT IN AN ORGANIZED HEALTH CARE EDUCATION/TRAINING PROGRAM

## 2024-04-21 PROCEDURE — 63600175 PHARM REV CODE 636 W HCPCS: Performed by: HOSPITALIST

## 2024-04-21 PROCEDURE — 11000001 HC ACUTE MED/SURG PRIVATE ROOM

## 2024-04-21 PROCEDURE — 63600175 PHARM REV CODE 636 W HCPCS: Performed by: FAMILY MEDICINE

## 2024-04-21 PROCEDURE — 25000003 PHARM REV CODE 250: Performed by: HOSPITALIST

## 2024-04-21 PROCEDURE — 25000003 PHARM REV CODE 250: Performed by: STUDENT IN AN ORGANIZED HEALTH CARE EDUCATION/TRAINING PROGRAM

## 2024-04-21 PROCEDURE — 85025 COMPLETE CBC W/AUTO DIFF WBC: CPT | Performed by: STUDENT IN AN ORGANIZED HEALTH CARE EDUCATION/TRAINING PROGRAM

## 2024-04-21 RX ORDER — HYDROMORPHONE HYDROCHLORIDE 2 MG/ML
1 INJECTION, SOLUTION INTRAMUSCULAR; INTRAVENOUS; SUBCUTANEOUS EVERY 6 HOURS PRN
Status: DISCONTINUED | OUTPATIENT
Start: 2024-04-21 | End: 2024-04-30 | Stop reason: HOSPADM

## 2024-04-21 RX ADMIN — SEVELAMER CARBONATE 1600 MG: 800 TABLET, FILM COATED ORAL at 04:04

## 2024-04-21 RX ADMIN — SEVELAMER CARBONATE 1600 MG: 800 TABLET, FILM COATED ORAL at 08:04

## 2024-04-21 RX ADMIN — METRONIDAZOLE 500 MG: 500 TABLET ORAL at 08:04

## 2024-04-21 RX ADMIN — SENNOSIDES AND DOCUSATE SODIUM 2 TABLET: 8.6; 5 TABLET ORAL at 08:04

## 2024-04-21 RX ADMIN — MUPIROCIN: 20 OINTMENT TOPICAL at 08:04

## 2024-04-21 RX ADMIN — MORPHINE SULFATE 4 MG: 4 INJECTION, SOLUTION INTRAMUSCULAR; INTRAVENOUS at 08:04

## 2024-04-21 RX ADMIN — SEVELAMER CARBONATE 1600 MG: 800 TABLET, FILM COATED ORAL at 11:04

## 2024-04-21 RX ADMIN — HYDROMORPHONE HYDROCHLORIDE 1 MG: 2 INJECTION, SOLUTION INTRAMUSCULAR; INTRAVENOUS; SUBCUTANEOUS at 10:04

## 2024-04-21 RX ADMIN — OXYCODONE 15 MG: 5 TABLET ORAL at 05:04

## 2024-04-21 RX ADMIN — CARVEDILOL 25 MG: 25 TABLET, FILM COATED ORAL at 08:04

## 2024-04-21 RX ADMIN — MORPHINE SULFATE 4 MG: 4 INJECTION, SOLUTION INTRAMUSCULAR; INTRAVENOUS at 02:04

## 2024-04-21 RX ADMIN — BUPROPION HYDROCHLORIDE 75 MG: 75 TABLET, FILM COATED ORAL at 08:04

## 2024-04-21 RX ADMIN — ACETAMINOPHEN 1000 MG: 500 TABLET ORAL at 04:04

## 2024-04-21 RX ADMIN — ACETAMINOPHEN 1000 MG: 500 TABLET ORAL at 06:04

## 2024-04-21 RX ADMIN — MORPHINE SULFATE 4 MG: 4 INJECTION, SOLUTION INTRAMUSCULAR; INTRAVENOUS at 12:04

## 2024-04-21 RX ADMIN — CARVEDILOL 25 MG: 25 TABLET, FILM COATED ORAL at 04:04

## 2024-04-21 RX ADMIN — SILVER SULFADIAZINE: 10 CREAM TOPICAL at 08:04

## 2024-04-21 RX ADMIN — HEPARIN SODIUM 5000 UNITS: 5000 INJECTION, SOLUTION INTRAVENOUS; SUBCUTANEOUS at 10:04

## 2024-04-21 RX ADMIN — OXYCODONE 15 MG: 5 TABLET ORAL at 11:04

## 2024-04-21 RX ADMIN — ISOSORBIDE MONONITRATE 30 MG: 30 TABLET, EXTENDED RELEASE ORAL at 08:04

## 2024-04-21 RX ADMIN — MEROPENEM 500 MG: 500 INJECTION, POWDER, FOR SOLUTION INTRAVENOUS at 04:04

## 2024-04-21 RX ADMIN — OXYCODONE 15 MG: 5 TABLET ORAL at 04:04

## 2024-04-21 RX ADMIN — OLANZAPINE 5 MG: 5 TABLET, FILM COATED ORAL at 08:04

## 2024-04-21 RX ADMIN — HEPARIN SODIUM 5000 UNITS: 5000 INJECTION, SOLUTION INTRAVENOUS; SUBCUTANEOUS at 08:04

## 2024-04-21 RX ADMIN — FAMOTIDINE 20 MG: 20 TABLET ORAL at 08:04

## 2024-04-21 NOTE — ASSESSMENT & PLAN NOTE
Previous cultures were pseudomonas and MSSA and treated with meropenum and levaquin.  Patient given zosyn in ED after cultures drawn.  I have added vancomycin.  Current lactic acid is normal and other than tachycardia (which may be related to her anemia) she does not meet sepsis criteria.    4/19 continue antibiotics.  Monitor for vancomycin toxicity  4/20- insert maintain urinary River catheter secondary to wounds of the proximal lower extremities and sacrum.  4/21-  Patient refused River catheterization and is getting up to go to the restroom now.  Continue with Merrem.

## 2024-04-21 NOTE — PLAN OF CARE
Problem: Skin Injury Risk Increased  Goal: Skin Health and Integrity  4/20/2024 2303 by Marion Matias RN  Outcome: Ongoing, Progressing  4/20/2024 2300 by Marion Matias RN  Outcome: Ongoing, Progressing     Problem: Adult Inpatient Plan of Care  Goal: Plan of Care Review  4/20/2024 2303 by Marion Matias RN  Outcome: Ongoing, Progressing  4/20/2024 2300 by Marion Matias RN  Outcome: Ongoing, Progressing  Flowsheets (Taken 4/20/2024 2300)  Plan of Care Reviewed With: patient  Goal: Patient-Specific Goal (Individualized)  4/20/2024 2303 by Marion Matias RN  Outcome: Ongoing, Progressing  4/20/2024 2300 by Marion Matias RN  Outcome: Ongoing, Progressing  Goal: Absence of Hospital-Acquired Illness or Injury  Outcome: Ongoing, Progressing  Goal: Optimal Comfort and Wellbeing  4/20/2024 2303 by Marion Matias RN  Outcome: Ongoing, Progressing  4/20/2024 2300 by Marion Matias RN  Outcome: Ongoing, Progressing  Goal: Readiness for Transition of Care  Outcome: Ongoing, Progressing     Problem: Infection  Goal: Absence of Infection Signs and Symptoms  Outcome: Ongoing, Progressing     Problem: Nutrition Impaired (Sepsis/Septic Shock)  Goal: Optimal Nutrition Intake  4/20/2024 2303 by Marion Matias RN  Outcome: Ongoing, Progressing  4/20/2024 2300 by Marion Matias RN  Outcome: Ongoing, Progressing     Problem: Hemodynamic Instability (Hemodialysis)  Goal: Effective Tissue Perfusion  Outcome: Ongoing, Progressing     Problem: Infection (Hemodialysis)  Goal: Absence of Infection Signs and Symptoms  4/20/2024 2303 by Marion Matias RN  Outcome: Ongoing, Progressing  4/20/2024 2300 by Marion Matias RN  Outcome: Ongoing, Progressing

## 2024-04-21 NOTE — DIALYSIS ROUNDING
The patient was dialyzed today without complication.There was a net fluid removal of 1150 ml during the treatment.  PE  Lungs-clear  Cor-no rub  Abd-soft    Plan:  Continue the present management.

## 2024-04-21 NOTE — SUBJECTIVE & OBJECTIVE
Interval History:  No acute events overnight    Review of Systems   Constitutional:  Negative for appetite change, chills, fatigue, fever and unexpected weight change.   HENT:  Negative for congestion, mouth sores, nosebleeds, sinus pain, sore throat and trouble swallowing.    Respiratory:  Negative for apnea, cough, chest tightness and shortness of breath.    Cardiovascular:  Negative for chest pain, palpitations and leg swelling.   Gastrointestinal:  Negative for abdominal pain, blood in stool, constipation, diarrhea, nausea and vomiting.   Genitourinary:  Negative for decreased urine volume, difficulty urinating, dysuria and frequency.   Musculoskeletal:  Positive for arthralgias and back pain. Negative for neck pain.   Skin:  Positive for wound. Negative for rash.   Neurological:  Negative for syncope, light-headedness and headaches.   Hematological:  Does not bruise/bleed easily.   Psychiatric/Behavioral:  Negative for agitation, confusion and suicidal ideas.      Objective:     Vital Signs (Most Recent):  Temp: 98.7 °F (37.1 °C) (04/21/24 1132)  Pulse: 94 (04/21/24 1132)  Resp: 16 (04/21/24 1423)  BP: 108/72 (04/21/24 1132)  SpO2: 97 % (04/21/24 1132) Vital Signs (24h Range):  Temp:  [98.7 °F (37.1 °C)-101.2 °F (38.4 °C)] 98.7 °F (37.1 °C)  Pulse:  [] 94  Resp:  [16-20] 16  SpO2:  [96 %-100 %] 97 %  BP: (108-139)/(72-94) 108/72     Weight: 69.6 kg (153 lb 7 oz)  Body mass index is 22.66 kg/m².    Intake/Output Summary (Last 24 hours) at 4/21/2024 1444  Last data filed at 4/21/2024 0831  Gross per 24 hour   Intake 360 ml   Output --   Net 360 ml         Physical Exam  Vitals and nursing note reviewed. Exam conducted with a chaperone present.   Constitutional:       General: She is not in acute distress.     Appearance: She is ill-appearing. She is not toxic-appearing or diaphoretic.   HENT:      Head: Atraumatic.      Mouth/Throat:      Mouth: Mucous membranes are moist.      Pharynx: Oropharynx is clear.    Eyes:      Conjunctiva/sclera: Conjunctivae normal.      Pupils: Pupils are equal, round, and reactive to light.   Neck:      Vascular: No carotid bruit.   Cardiovascular:      Rate and Rhythm: Regular rhythm. Tachycardia present.      Pulses: Normal pulses.      Heart sounds: Normal heart sounds.      Comments: Bruit auscultated/thrill palpated LUE  Pulmonary:      Effort: Pulmonary effort is normal.      Breath sounds: Normal breath sounds.   Abdominal:      General: Bowel sounds are normal. There is distension.      Palpations: There is shifting dullness and fluid wave.      Tenderness: There is no abdominal tenderness. There is no right CVA tenderness, left CVA tenderness, guarding or rebound.   Musculoskeletal:         General: Deformity present. No tenderness or signs of injury. Normal range of motion.      Cervical back: Neck supple.      Right lower leg: Edema present.      Left lower leg: Edema present.   Skin:     General: Skin is warm and dry.      Capillary Refill: Capillary refill takes less than 2 seconds.      Coloration: Skin is not jaundiced or pale.      Findings: No bruising, lesion or rash.   Neurological:      General: No focal deficit present.      Mental Status: She is alert and oriented to person, place, and time.   Psychiatric:         Mood and Affect: Mood normal. Affect is blunt and flat.             Significant Labs: All pertinent labs within the past 24 hours have been reviewed.    Significant Imaging: I have reviewed all pertinent imaging results/findings within the past 24 hours.

## 2024-04-21 NOTE — ASSESSMENT & PLAN NOTE
Patient's anemia is currently uncontrolled. Has not received any PRBCs to date. Etiology likely d/t chronic disease due to kwa shiorkor and chronic inflammation from wounds.    Current CBC reviewed-   Lab Results   Component Value Date    HGB 7.5 (L) 04/21/2024    HCT 24.7 (L) 04/21/2024     Monitor serial CBC and transfuse if patient becomes hemodynamically unstable, symptomatic or H/H drops below 7/21.    Patient has been T&S and can be transfused today on HD.   She has had multiple transfusion and I will add anemia panel to her previous labs.    4/19 chronic anemia with her CKD, however acute blood loss is causing a worsening of her anemia now.  4/20- Transfuse during dialysis today.  4/21-  H&H stable at 7.5 and 25 today.  CBC in a.m..

## 2024-04-21 NOTE — ASSESSMENT & PLAN NOTE
Surgery has been consulted though not sure this is a surgical problem  Broad spectrum IV abx started and cultures are pending.  Wound care team also consulted,.  Perhaps nephrology could offer some recommendations since they may see this more often in HD.      4/19 if this is true biopsy-proven calciphylaxis her prognosis is grim.  There is no approved treatment for this disorder and the pathophysiology is poorly understood.  Six-month mortality is roughly 50% after diagnosis.  We have surgical pathology pending.  The best we can do at this point is to treat her infected wounds.  Hopefully in her particular case this disease we will be self-limiting.  There was apparently extensive palliative care discussions with the patient while for [facility], however she is decided to continue full therapy.    4/20- wound cultures positive for Citrobacter which is sensitive to Merrem and resistant to Zosyn.  We will change antibiotics today.  Blood culture remain negative.  4/21-  continue Merrem and monitor for response.

## 2024-04-21 NOTE — PLAN OF CARE
Problem: Skin Injury Risk Increased  Goal: Skin Health and Integrity  Outcome: Ongoing, Progressing     Problem: Adult Inpatient Plan of Care  Goal: Plan of Care Review  Outcome: Ongoing, Progressing  Flowsheets (Taken 4/20/2024 2300)  Plan of Care Reviewed With: patient  Goal: Patient-Specific Goal (Individualized)  Outcome: Ongoing, Progressing  Goal: Optimal Comfort and Wellbeing  Outcome: Ongoing, Progressing     Problem: Nutrition Impaired (Sepsis/Septic Shock)  Goal: Optimal Nutrition Intake  Outcome: Ongoing, Progressing     Problem: Infection (Hemodialysis)  Goal: Absence of Infection Signs and Symptoms  Outcome: Ongoing, Progressing     Problem: Fall Injury Risk  Goal: Absence of Fall and Fall-Related Injury  Outcome: Ongoing, Progressing

## 2024-04-21 NOTE — PROGRESS NOTES
[facility] - [address] [place] [address] Medicine  Progress Note    Patient Name: Madhav Correa  MRN: [id number]  Patient Class: IP- Inpatient   Admission Date: 4/17/2024  Length of Stay: 3 days  Attending Physician: Luis Alfredo Valentin DO  Primary Care Provider: Rehab, [facility]        Subjective:     Principal Problem:Calciphylaxis with nonhealing ulcer of leg        HPI:  32 yo F presents to [facility] ED from [facility] for worsening wounds.  Patient is severely debilitated due to SLE.  She was diagnosed five years ago and has suffered secondary hypertension and  ESRD and is now on HD TTS.  She has also developed severe calciphylaxis during her daily dialysis when she was hospitalized at [facility].  Patient lives in [place] and was transferred for wound care to [place] because they were one of few nursing homes that would accept HD patients.      This patient has a tragic story and I have read the dc summary from 4/424 regarding the eight week course of thiosulfate and the calciphylaxis becoming worse.  She has developed severe wounds on her legs and buttocks (see photos).  She was on meropenum and levaquin in hospital for MSSA and pseudomonas.  She was discharged to the nursing home with wound care and levaquin.  Patient has worsening wounds and the nursing home sent her to [facility] ED for further evaluation and admission.  Prior to transfer from [facility], the discharging physician and the nephrologist and ID physician spoke to her about considering hospice/palliative care.  Patient was adamant about being a full code.  She has two children (12 yo and 12 yo).  She wants to raise her children.      She states that she is able to stand and walk some with assistance.  She does move her upper extremities and feeds herself but has generalized weakness of all extremities but says she is not familiar with the term transverse myelitis and is not sure why her extremities so  weak (3/5 of all four - able to lift gravity).  She has some atrophy noted but most of her leg problems may be from being bedridden with these wounds.  She has been on steroids previously for lupus flares but not chronically.       Overview/Hospital Course:  4/19 worsening anemia today.  We will transfuse  4/20- the patient was seen examined today resting comfortably in bed during dialysis.  She has been transfused 1 unit packed red blood cells secondary to a low H&H of 6.823.  We will recheck H&H post transfusion.  Also plan to insert maintain urinary River catheter secondary to extensive wounds of the proximal lower extremities and sacrum.  4/21-  the patient was  seen and examined resting comfortably in bed, in no acute distress with no acute events overnight.  The patient's antibiotics were changed from Zosyn to Merrem secondary to wound culture sensitivity  To Citrobacter.  We will monitor for response.  Blood cultures have been negative.  Patient's H&H improved 7.5 and 25 following 1 unit packed red blood cells yesterday.    Interval History:  No acute events overnight    Review of Systems   Constitutional:  Negative for appetite change, chills, fatigue, fever and unexpected weight change.   HENT:  Negative for congestion, mouth sores, nosebleeds, sinus pain, sore throat and trouble swallowing.    Respiratory:  Negative for apnea, cough, chest tightness and shortness of breath.    Cardiovascular:  Negative for chest pain, palpitations and leg swelling.   Gastrointestinal:  Negative for abdominal pain, blood in stool, constipation, diarrhea, nausea and vomiting.   Genitourinary:  Negative for decreased urine volume, difficulty urinating, dysuria and frequency.   Musculoskeletal:  Positive for arthralgias and back pain. Negative for neck pain.   Skin:  Positive for wound. Negative for rash.   Neurological:  Negative for syncope, light-headedness and headaches.   Hematological:  Does not bruise/bleed easily.    Psychiatric/Behavioral:  Negative for agitation, confusion and suicidal ideas.      Objective:     Vital Signs (Most Recent):  Temp: 98.7 °F (37.1 °C) (04/21/24 1132)  Pulse: 94 (04/21/24 1132)  Resp: 16 (04/21/24 1423)  BP: 108/72 (04/21/24 1132)  SpO2: 97 % (04/21/24 1132) Vital Signs (24h Range):  Temp:  [98.7 °F (37.1 °C)-101.2 °F (38.4 °C)] 98.7 °F (37.1 °C)  Pulse:  [] 94  Resp:  [16-20] 16  SpO2:  [96 %-100 %] 97 %  BP: (108-139)/(72-94) 108/72     Weight: 69.6 kg (153 lb 7 oz)  Body mass index is 22.66 kg/m².    Intake/Output Summary (Last 24 hours) at 4/21/2024 1444  Last data filed at 4/21/2024 0831  Gross per 24 hour   Intake 360 ml   Output --   Net 360 ml         Physical Exam  Vitals and nursing note reviewed. Exam conducted with a chaperone present.   Constitutional:       General: She is not in acute distress.     Appearance: She is ill-appearing. She is not toxic-appearing or diaphoretic.   HENT:      Head: Atraumatic.      Mouth/Throat:      Mouth: Mucous membranes are moist.      Pharynx: Oropharynx is clear.   Eyes:      Conjunctiva/sclera: Conjunctivae normal.      Pupils: Pupils are equal, round, and reactive to light.   Neck:      Vascular: No carotid bruit.   Cardiovascular:      Rate and Rhythm: Regular rhythm. Tachycardia present.      Pulses: Normal pulses.      Heart sounds: Normal heart sounds.      Comments: Bruit auscultated/thrill palpated LUE  Pulmonary:      Effort: Pulmonary effort is normal.      Breath sounds: Normal breath sounds.   Abdominal:      General: Bowel sounds are normal. There is distension.      Palpations: There is shifting dullness and fluid wave.      Tenderness: There is no abdominal tenderness. There is no right CVA tenderness, left CVA tenderness, guarding or rebound.   Musculoskeletal:         General: Deformity present. No tenderness or signs of injury. Normal range of motion.      Cervical back: Neck supple.      Right lower leg: Edema present.       Left lower leg: Edema present.   Skin:     General: Skin is warm and dry.      Capillary Refill: Capillary refill takes less than 2 seconds.      Coloration: Skin is not jaundiced or pale.      Findings: No bruising, lesion or rash.   Neurological:      General: No focal deficit present.      Mental Status: She is alert and oriented to person, place, and time.   Psychiatric:         Mood and Affect: Mood normal. Affect is blunt and flat.             Significant Labs: All pertinent labs within the past 24 hours have been reviewed.    Significant Imaging: I have reviewed all pertinent imaging results/findings within the past 24 hours.    Assessment/Plan:      * Calciphylaxis with nonhealing ulcer of leg  Surgery has been consulted though not sure this is a surgical problem  Broad spectrum IV abx started and cultures are pending.  Wound care team also consulted,.  Perhaps nephrology could offer some recommendations since they may see this more often in HD.      4/19 if this is true biopsy-proven calciphylaxis her prognosis is grim.  There is no approved treatment for this disorder and the pathophysiology is poorly understood.  Six-month mortality is roughly 50% after diagnosis.  We have surgical pathology pending.  The best we can do at this point is to treat her infected wounds.  Hopefully in her particular case this disease we will be self-limiting.  There was apparently extensive palliative care discussions with the patient while for [facility], however she is decided to continue full therapy.    4/20- wound cultures positive for Citrobacter which is sensitive to Merrem and resistant to Zosyn.  We will change antibiotics today.  Blood culture remain negative.  4/21-  continue Merrem and monitor for response.    Acute blood loss anemia  Patient's anemia is currently uncontrolled. Has received 1 units of PRBCs on 4/19 . Etiology likely d/t acute blood loss which was from surgery yesterday  Current CBC reviewed-    Lab Results   Component Value Date    HGB 6.2 (L) 04/19/2024    HCT 20.8 (L) 04/19/2024     Monitor serial CBC and transfuse if patient becomes hemodynamically unstable, symptomatic or H/H drops below 7/21.    Secondary hyperparathyroidism  Appreciate nephrology input.  Continue dialysis per their recommendation      Pyuria  Antibiotic      Sepsis  This patient does have evidence of infective focus  My overall impression is sepsis.  Source: Skin and Soft Tissue (location lower extremities)  Antibiotics given-   Antibiotics (72h ago, onward)      Start     Stop Route Frequency Ordered    04/19/24 0900  metroNIDAZOLE tablet 500 mg         04/26/24 0859 Oral Every 12 hours 04/19/24 0718    04/19/24 0900  mupirocin 2 % ointment         -- Top Daily 04/19/24 0719 04/18/24 1200  piperacillin-tazobactam (ZOSYN) 4.5 g in dextrose 5 % in water (D5W) 100 mL IVPB (MB+)         -- IV Every 12 hours (non-standard times) 04/18/24 0426    04/18/24 0548  vancomycin - pharmacy to dose         -- IV pharmacy to manage frequency 04/18/24 0448          Latest lactate reviewed-  Recent Labs   Lab 04/17/24  2332   LACTATE 0.8     Organ dysfunction indicated by  none    Fluid challenge Not needed - patient is not hypotensive      Post- resuscitation assessment No - Post resuscitation assessment not needed       Will Not start Pressors- Levophed for MAP of 65  Source control achieved by:     4/19 vancomycin and Zosyn.  Monitor for vancomycin toxicity.  Follow up cultures.  Judicious use of IV fluids given her ESRD.    Pressure injury of skin with infection    Wound care    Severe protein-calorie malnutrition  Nutrition consulted. Most recent weight and BMI monitored-     Measurements:  Wt Readings from Last 1 Encounters:   04/19/24 76.6 kg (168 lb 14 oz)   Body mass index is 24.94 kg/m².    Nutrition consult and PAB ordered.    Patient has been screened and assessed by RD.    Malnutrition Type:  Context:    Level:      Malnutrition  Characteristic Summary:       Interventions/Recommendations (treatment strategy):  recommend to advance diet as medically appropriate; add Keegan BID      Sacral decubitus ulcer  Previous cultures were pseudomonas and MSSA and treated with meropenum and levaquin.  Patient given zosyn in ED after cultures drawn.  I have added vancomycin.  Current lactic acid is normal and other than tachycardia (which may be related to her anemia) she does not meet sepsis criteria.    4/19 continue antibiotics.  Monitor for vancomycin toxicity  4/20- insert maintain urinary River catheter secondary to wounds of the proximal lower extremities and sacrum.  4/21-  Patient refused River catheterization and is getting up to go to the restroom now.  Continue with Merrem.    Atypical anxiety disorder  Continue her home buspar and nighttime zyprexa.  Patient appears depressed with flat affect but she assures me she is not and is hopeful that we can help her and for her future.  However, she has been told of her dismal prognosis.  Will not consult psychiatry at this time.  Patient states her condition is controlled on current psychotropics.        Secondary hypertension  Continue her home antihypertensive medications.    Chronic, controlled. Latest blood pressure and vitals reviewed-     Temp:  [98.7 °F (37.1 °C)-101.2 °F (38.4 °C)]   Pulse:  []   Resp:  [16-20]   BP: (108-139)/(72-94)   SpO2:  [96 %-100 %] .   Home meds for hypertension were reviewed and noted below.   Hypertension Medications               carvediloL (COREG) 25 MG tablet Take 1 tablet by mouth 2 (two) times daily with meals.    hydrALAZINE (APRESOLINE) 100 MG tablet Take 100 mg by mouth every 8 (eight) hours.    isosorbide mononitrate (IMDUR) 30 MG 24 hr tablet Take 30 mg by mouth once daily.            While in the hospital, will manage blood pressure as follows; Continue home antihypertensive regimen    Will utilize p.r.n. blood pressure medication only if patient's  blood pressure greater than 180/110 and she develops symptoms such as worsening chest pain or shortness of breath.     SLE (systemic lupus erythematosus)  Not currently on any biologics.  Has been tried on previous medications.  Has followed with a rheumatologist in [place].        ESRD (end stage renal disease) on dialysis  Creatine stable for now. BMP reviewed- noted Estimated Creatinine Clearance: 19.4 mL/min (A) (based on SCr of 4.3 mg/dL (H)). according to latest data. Based on current GFR, CKD stage is stage 5 - GFR < 15.  Monitor UOP and serial BMP and adjust therapy as needed. Renally dose meds. Avoid nephrotoxic medications and procedures.    HD TTF    GERD (gastroesophageal reflux disease)  Continue OP PPI.        Asthma  PRN bronchodilator  4/19 respiratory status is stable    Anemia in chronic kidney disease  Patient's anemia is currently uncontrolled. Has not received any PRBCs to date. Etiology likely d/t chronic disease due to [place] and chronic inflammation from wounds.    Current CBC reviewed-   Lab Results   Component Value Date    HGB 7.5 (L) 04/21/2024    HCT 24.7 (L) 04/21/2024     Monitor serial CBC and transfuse if patient becomes hemodynamically unstable, symptomatic or H/H drops below 7/21.    Patient has been T&S and can be transfused today on HD.   She has had multiple transfusion and I will add anemia panel to her previous labs.    4/19 chronic anemia with her CKD, however acute blood loss is causing a worsening of her anemia now.  4/20- Transfuse during dialysis today.  4/21-  H&H stable at 7.5 and 25 today.  CBC in a.m..      VTE Risk Mitigation (From admission, onward)           Ordered     heparin (porcine) injection 5,000 Units  Every 12 hours         04/18/24 0428                    Discharge Planning   ESTHER: 4/25/2024     Code Status: Full Code   Is the patient medically ready for discharge?:     Reason for patient still in hospital (select all that apply): Treatment  Discharge  Plan A: Skilled Nursing Facility                  Luis Alfredo Valentin DO  Department of Hospital Medicine   [facility] - [address] [place] [address]

## 2024-04-21 NOTE — ASSESSMENT & PLAN NOTE
Continue her home antihypertensive medications.    Chronic, controlled. Latest blood pressure and vitals reviewed-     Temp:  [98.7 °F (37.1 °C)-101.2 °F (38.4 °C)]   Pulse:  []   Resp:  [16-20]   BP: (108-139)/(72-94)   SpO2:  [96 %-100 %] .   Home meds for hypertension were reviewed and noted below.   Hypertension Medications               carvediloL (COREG) 25 MG tablet Take 1 tablet by mouth 2 (two) times daily with meals.    hydrALAZINE (APRESOLINE) 100 MG tablet Take 100 mg by mouth every 8 (eight) hours.    isosorbide mononitrate (IMDUR) 30 MG 24 hr tablet Take 30 mg by mouth once daily.            While in the hospital, will manage blood pressure as follows; Continue home antihypertensive regimen    Will utilize p.r.n. blood pressure medication only if patient's blood pressure greater than 180/110 and she develops symptoms such as worsening chest pain or shortness of breath.

## 2024-04-22 PROBLEM — N30.00 ACUTE CYSTITIS WITHOUT HEMATURIA: Status: ACTIVE | Noted: 2024-04-22

## 2024-04-22 PROBLEM — N25.81 SECONDARY HYPERPARATHYROIDISM: Status: RESOLVED | Noted: 2024-04-18 | Resolved: 2024-04-22

## 2024-04-22 PROBLEM — L89.90 PRESSURE INJURY OF SKIN WITH INFECTION: Status: RESOLVED | Noted: 2024-04-18 | Resolved: 2024-04-22

## 2024-04-22 PROBLEM — A41.9 SEPSIS: Status: RESOLVED | Noted: 2024-04-18 | Resolved: 2024-04-22

## 2024-04-22 PROBLEM — R82.81 PYURIA: Status: RESOLVED | Noted: 2024-04-18 | Resolved: 2024-04-22

## 2024-04-22 PROBLEM — L08.9 PRESSURE INJURY OF SKIN WITH INFECTION: Status: RESOLVED | Noted: 2024-04-18 | Resolved: 2024-04-22

## 2024-04-22 LAB
ALBUMIN SERPL BCP-MCNC: 1.7 G/DL (ref 3.5–5)
ANION GAP SERPL CALCULATED.3IONS-SCNC: 12 MMOL/L (ref 7–16)
ANISOCYTOSIS BLD QL SMEAR: ABNORMAL
BASOPHILS # BLD AUTO: 0.03 K/UL (ref 0–0.2)
BASOPHILS NFR BLD AUTO: 0.5 % (ref 0–1)
BUN SERPL-MCNC: 34 MG/DL (ref 7–18)
BUN/CREAT SERPL: 7 (ref 6–20)
CALCIUM SERPL-MCNC: 8.5 MG/DL (ref 8.5–10.1)
CHLORIDE SERPL-SCNC: 102 MMOL/L (ref 98–107)
CO2 SERPL-SCNC: 24 MMOL/L (ref 21–32)
CREAT SERPL-MCNC: 4.94 MG/DL (ref 0.55–1.02)
CRENATED CELLS: ABNORMAL
DIFFERENTIAL METHOD BLD: ABNORMAL
EGFR (NO RACE VARIABLE) (RUSH/TITUS): 11 ML/MIN/1.73M2
EOSINOPHIL # BLD AUTO: 0.48 K/UL (ref 0–0.5)
EOSINOPHIL NFR BLD AUTO: 7.5 % (ref 1–4)
EOSINOPHIL NFR BLD MANUAL: 6 % (ref 1–4)
ERYTHROCYTE [DISTWIDTH] IN BLOOD BY AUTOMATED COUNT: 20.2 % (ref 11.5–14.5)
ESTROGEN SERPL-MCNC: NORMAL PG/ML
GLUCOSE SERPL-MCNC: 91 MG/DL (ref 74–106)
HCT VFR BLD AUTO: 27.5 % (ref 38–47)
HGB BLD-MCNC: 8.2 G/DL (ref 12–16)
HYPOCHROMIA BLD QL SMEAR: ABNORMAL
IMM GRANULOCYTES # BLD AUTO: 0.03 K/UL (ref 0–0.04)
IMM GRANULOCYTES NFR BLD: 0.5 % (ref 0–0.4)
INSULIN SERPL-ACNC: NORMAL U[IU]/ML
LAB AP GROSS DESCRIPTION: NORMAL
LAB AP LABORATORY NOTES: NORMAL
LYMPHOCYTES # BLD AUTO: 1.8 K/UL (ref 1–4.8)
LYMPHOCYTES NFR BLD AUTO: 28.3 % (ref 27–41)
LYMPHOCYTES NFR BLD MANUAL: 26 % (ref 27–41)
MCH RBC QN AUTO: 27.9 PG (ref 27–31)
MCHC RBC AUTO-ENTMCNC: 29.8 G/DL (ref 32–36)
MCV RBC AUTO: 93.5 FL (ref 80–96)
MONOCYTES # BLD AUTO: 0.89 K/UL (ref 0–0.8)
MONOCYTES NFR BLD AUTO: 14 % (ref 2–6)
MONOCYTES NFR BLD MANUAL: 11 % (ref 2–6)
MPC BLD CALC-MCNC: 9.4 FL (ref 9.4–12.4)
NEUTROPHILS # BLD AUTO: 3.14 K/UL (ref 1.8–7.7)
NEUTROPHILS NFR BLD AUTO: 49.2 % (ref 53–65)
NEUTS BAND NFR BLD MANUAL: 4 % (ref 1–5)
NEUTS SEG NFR BLD MANUAL: 53 % (ref 50–62)
NRBC # BLD AUTO: 0 X10E3/UL
NRBC, AUTO (.00): 0 %
OVALOCYTES BLD QL SMEAR: ABNORMAL
PHOSPHATE SERPL-MCNC: 3.2 MG/DL (ref 2.5–4.5)
PLATELET # BLD AUTO: 193 K/UL (ref 150–400)
PLATELET MORPHOLOGY: NORMAL
POLYCHROMASIA BLD QL SMEAR: ABNORMAL
POTASSIUM SERPL-SCNC: 5.4 MMOL/L (ref 3.5–5.1)
RBC # BLD AUTO: 2.94 M/UL (ref 4.2–5.4)
SCHISTOCYTES BLD QL AUTO: ABNORMAL
SODIUM SERPL-SCNC: 133 MMOL/L (ref 136–145)
T3RU NFR SERPL: NORMAL %
WBC # BLD AUTO: 6.37 K/UL (ref 4.5–11)

## 2024-04-22 PROCEDURE — 25000003 PHARM REV CODE 250: Performed by: HOSPITALIST

## 2024-04-22 PROCEDURE — 63600175 PHARM REV CODE 636 W HCPCS: Performed by: STUDENT IN AN ORGANIZED HEALTH CARE EDUCATION/TRAINING PROGRAM

## 2024-04-22 PROCEDURE — 11000001 HC ACUTE MED/SURG PRIVATE ROOM

## 2024-04-22 PROCEDURE — 63600175 PHARM REV CODE 636 W HCPCS: Performed by: FAMILY MEDICINE

## 2024-04-22 PROCEDURE — 63600175 PHARM REV CODE 636 W HCPCS: Performed by: HOSPITALIST

## 2024-04-22 PROCEDURE — 85025 COMPLETE CBC W/AUTO DIFF WBC: CPT | Performed by: STUDENT IN AN ORGANIZED HEALTH CARE EDUCATION/TRAINING PROGRAM

## 2024-04-22 PROCEDURE — 99232 SBSQ HOSP IP/OBS MODERATE 35: CPT | Mod: ,,, | Performed by: HOSPITALIST

## 2024-04-22 PROCEDURE — 80069 RENAL FUNCTION PANEL: CPT | Performed by: STUDENT IN AN ORGANIZED HEALTH CARE EDUCATION/TRAINING PROGRAM

## 2024-04-22 PROCEDURE — 25000003 PHARM REV CODE 250: Performed by: STUDENT IN AN ORGANIZED HEALTH CARE EDUCATION/TRAINING PROGRAM

## 2024-04-22 PROCEDURE — 25000003 PHARM REV CODE 250: Performed by: FAMILY MEDICINE

## 2024-04-22 RX ADMIN — MEROPENEM 500 MG: 500 INJECTION, POWDER, FOR SOLUTION INTRAVENOUS at 04:04

## 2024-04-22 RX ADMIN — SENNOSIDES AND DOCUSATE SODIUM 2 TABLET: 8.6; 5 TABLET ORAL at 08:04

## 2024-04-22 RX ADMIN — HYDROMORPHONE HYDROCHLORIDE 1 MG: 2 INJECTION, SOLUTION INTRAMUSCULAR; INTRAVENOUS; SUBCUTANEOUS at 08:04

## 2024-04-22 RX ADMIN — METRONIDAZOLE 500 MG: 500 TABLET ORAL at 08:04

## 2024-04-22 RX ADMIN — HEPARIN SODIUM 5000 UNITS: 5000 INJECTION, SOLUTION INTRAVENOUS; SUBCUTANEOUS at 09:04

## 2024-04-22 RX ADMIN — HEPARIN SODIUM 5000 UNITS: 5000 INJECTION, SOLUTION INTRAVENOUS; SUBCUTANEOUS at 08:04

## 2024-04-22 RX ADMIN — FAMOTIDINE 20 MG: 20 TABLET ORAL at 09:04

## 2024-04-22 RX ADMIN — MUPIROCIN: 20 OINTMENT TOPICAL at 09:04

## 2024-04-22 RX ADMIN — OXYCODONE 15 MG: 5 TABLET ORAL at 12:04

## 2024-04-22 RX ADMIN — BUPROPION HYDROCHLORIDE 75 MG: 75 TABLET, FILM COATED ORAL at 09:04

## 2024-04-22 RX ADMIN — SILVER SULFADIAZINE: 10 CREAM TOPICAL at 09:04

## 2024-04-22 RX ADMIN — METRONIDAZOLE 500 MG: 500 TABLET ORAL at 09:04

## 2024-04-22 RX ADMIN — CARVEDILOL 25 MG: 25 TABLET, FILM COATED ORAL at 04:04

## 2024-04-22 RX ADMIN — SENNOSIDES AND DOCUSATE SODIUM 2 TABLET: 8.6; 5 TABLET ORAL at 09:04

## 2024-04-22 RX ADMIN — ISOSORBIDE MONONITRATE 30 MG: 30 TABLET, EXTENDED RELEASE ORAL at 09:04

## 2024-04-22 RX ADMIN — SEVELAMER CARBONATE 1600 MG: 800 TABLET, FILM COATED ORAL at 04:04

## 2024-04-22 RX ADMIN — SEVELAMER CARBONATE 1600 MG: 800 TABLET, FILM COATED ORAL at 08:04

## 2024-04-22 RX ADMIN — ACETAMINOPHEN 1000 MG: 500 TABLET ORAL at 04:04

## 2024-04-22 RX ADMIN — OXYCODONE 15 MG: 5 TABLET ORAL at 06:04

## 2024-04-22 RX ADMIN — SEVELAMER CARBONATE 1600 MG: 800 TABLET, FILM COATED ORAL at 12:04

## 2024-04-22 RX ADMIN — OLANZAPINE 5 MG: 5 TABLET, FILM COATED ORAL at 08:04

## 2024-04-22 RX ADMIN — HYDROMORPHONE HYDROCHLORIDE 1 MG: 2 INJECTION, SOLUTION INTRAMUSCULAR; INTRAVENOUS; SUBCUTANEOUS at 07:04

## 2024-04-22 RX ADMIN — BUPROPION HYDROCHLORIDE 75 MG: 75 TABLET, FILM COATED ORAL at 08:04

## 2024-04-22 RX ADMIN — HYDROMORPHONE HYDROCHLORIDE 1 MG: 2 INJECTION, SOLUTION INTRAMUSCULAR; INTRAVENOUS; SUBCUTANEOUS at 02:04

## 2024-04-22 RX ADMIN — CARVEDILOL 25 MG: 25 TABLET, FILM COATED ORAL at 08:04

## 2024-04-22 RX ADMIN — MORPHINE SULFATE 4 MG: 4 INJECTION, SOLUTION INTRAMUSCULAR; INTRAVENOUS at 04:04

## 2024-04-22 NOTE — NURSING
1907 Consult to infectious diseased acknowledged. Discussed with nursing supervisor, Rhiannon. Guanako no longer has infectious disease in the hospital. Dr. Duggan made aware and will discuss with Dr. Rodrigues in the AM.

## 2024-04-22 NOTE — PROGRESS NOTES
Patient denies shortness of breath.  Review of systems musculoskeletal-patient continue with complaints of pain in her wounds especially her sacral area.    Physical exam general patient in no acute distress     Vitals:    04/22/24 1222 04/22/24 1432 04/22/24 1557 04/22/24 1623   BP:   109/68    Pulse:   105 105   Resp: 16 18 14 14   Temp:   (!) 100.7 °F (38.2 °C) (!) 100.4 °F (38 °C)   TempSrc:   Oral    SpO2:   97% 98%   Weight:       Height:          Assessment/plan 1.  ESRD-continue HD support  2.  Calciphylaxis-continue sodium thiosulfate  3.  Anemia-patient's hematocrit is 24%, I will start EPO  4.  Pyuria   5. Leg wounds-continue wound care and antibiotics  6.  Sacral wound-continue wound care  7.  Lupus

## 2024-04-22 NOTE — ASSESSMENT & PLAN NOTE
On Merrem and Flagyl; has Psudomonas and Citrobacter in wound cultures; negative blood cultures; followed by wound care; having fevers; will add Vancomycin for presumed UTI

## 2024-04-22 NOTE — PROGRESS NOTES
[facility] Tele[facility]  Nephrology  Progress Note    Patient Name: Madhav Correa  MRN: [id number]  Admission Date: 4/17/2024  Hospital Length of Stay: 3 days  Attending Provider: Luis Alfredo Valentin DO   Primary Care Physician: [facility]  Principal Problem:Calciphylaxis with nonhealing ulcer of leg    Consults  Subjective:     Interval History: The patient has no complaints today    Review of patient's allergies indicates:   Allergen Reactions    Iodine Swelling     Causes swelling, itching , and nausea.     Current Facility-Administered Medications   Medication Dose Route Frequency Provider Last Rate Last Admin    0.9%  NaCl infusion (for blood administration)   Intravenous Q24H PRN Ben Crawford MD        0.9%  NaCl infusion   Intravenous PRN Elvis Ndiaye DO   Stopped at 04/20/24 1255    acetaminophen tablet 1,000 mg  1,000 mg Oral Q6H PRN Alejandrina Duggan MD   1,000 mg at 04/21/24 1641    bisacodyL EC tablet 10 mg  10 mg Oral Daily PRN Alejandrina Duggan MD        bisacodyL suppository 10 mg  10 mg Rectal Daily PRN Luis Alfredo Valentin DO        buPROPion tablet 75 mg  75 mg Oral BID Alejandrina Duggan MD   75 mg at 04/21/24 2029    carvediloL tablet 25 mg  25 mg Oral BID WM Alejandrina Duggan MD   25 mg at 04/21/24 1637    dextromethorphan-guaiFENesin  mg/5 ml liquid 10 mL  10 mL Oral Q6H PRN Alejandrina Duggan MD        famotidine tablet 20 mg  20 mg Oral Daily Alejandrina Duggan MD   20 mg at 04/21/24 0831    heparin (porcine) injection 5,000 Units  5,000 Units Subcutaneous Q12H Alejandrina Duggan MD   5,000 Units at 04/21/24 0832    HYDROmorphone (PF) injection 1 mg  1 mg Intravenous Q6H PRN Luis Alfredo Valentin DO        isosorbide mononitrate 24 hr tablet 30 mg  30 mg Oral Daily Alejandrina Duggan MD   30 mg at 04/21/24 0831    melatonin tablet 6 mg  6 mg Oral Nightly PRN Alejandrina Duggan MD        meropenem (MERREM) 500 mg in  sodium chloride 0.9 % 100 mL IVPB (MB+)  500 mg Intravenous Q24H Luis Alfredo Valentin DO   Stopped at 04/21/24 1737    metroNIDAZOLE tablet 500 mg  500 mg Oral Q12H Elvis Ndiaye DO   500 mg at 04/21/24 2029    morphine injection 4 mg  4 mg Intravenous Q6H PRN Elvis Ndiaye DO   4 mg at 04/21/24 2029    mupirocin 2 % ointment   Topical (Top) Daily Elvis Ndiaye DO   Given at 04/21/24 0833    OLANZapine tablet 5 mg  5 mg Oral QHS Alejandrina Duggan MD   5 mg at 04/21/24 2029    ondansetron injection 8 mg  8 mg Intravenous Q6H PRN Alejandrina Duggan MD        oxyCODONE immediate release tablet 15 mg  15 mg Oral Q6H PRN Alejandrina Duggan MD   15 mg at 04/21/24 1744    senna-docusate 8.6-50 mg per tablet 2 tablet  2 tablet Oral BID Alejandrina Duggan MD   2 tablet at 04/21/24 2029    sevelamer carbonate tablet 1,600 mg  1,600 mg Oral TIDWM Alejandrina Duggan MD   1,600 mg at 04/21/24 1637    silver sulfADIAZINE 1% cream   Topical (Top) Daily Henok Gage DO   Given at 04/21/24 0833    simethicone chewable tablet 80 mg  1 tablet Oral TID PRN Alejandrina Duggan MD        sodium thiosulfate 25 g in sodium chloride 0.9% 250 mL IVPB  25 g Intravenous Every Tues, Thurs, Sat Ben Crawford MD   Stopped at 04/20/24 1253    traZODone tablet 50 mg  50 mg Oral Nightly PRN Alejandrina Duggan MD        vancomycin - pharmacy to dose   Intravenous pharmacy to manage frequency Alejandrina Duggan MD           Objective:     Vital Signs (Most Recent):  Temp: 98.2 °F (36.8 °C) (04/21/24 2026)  Pulse: 97 (04/21/24 2026)  Resp: 20 (04/21/24 2029)  BP: 107/69 (04/21/24 2026)  SpO2: 98 % (04/21/24 2026) Vital Signs (24h Range):  Temp:  [98.2 °F (36.8 °C)-101.2 °F (38.4 °C)] 98.2 °F (36.8 °C)  Pulse:  [] 97  Resp:  [16-20] 20  SpO2:  [96 %-100 %] 98 %  BP: (107-123)/(69-76) 107/69     Weight: 69.6 kg (153 lb 7 oz) (04/21/24 0514)  Body mass index is 22.66 kg/m².  Body surface area is 1.84 meters squared.    I/O  last 3 completed shifts:  In: 2210 [P.O.:1160; Blood:700; Other:350]  Out: 2000 [Other:2000]    Physical Exam  Lungs-clear  Cor-no murmur or rub  Abd-soft,nontender    Significant Labs:sureCBC:   Recent Labs   Lab 04/21/24  0409   WBC 9.27   RBC 2.69*   HGB 7.5*   HCT 24.7*      MCV 91.8   MCH 27.9   MCHC 30.4*     CMP:   Recent Labs   Lab 04/18/24  0024 04/19/24  0743 04/21/24  0409   GLU 90   < > 109*   CALCIUM 8.6   < > 8.6   ALBUMIN 1.9*   < > 1.7*   PROT 7.0  --   --       < > 136   K 4.5   < > 4.2   CO2 30   < > 26      < > 105   BUN 41*   < > 24*   CREATININE 4.98*   < > 3.87*   ALKPHOS 72  --   --    ALT <6*  --   --    AST <3*  --   --    BILITOT 0.5  --   --     < > = values in this interval not displayed.     All labs within the past 24 hours have been reviewed.    Significant Imaging:      Assessment/Plan:     Active Diagnoses:    Diagnosis Date Noted POA    PRINCIPAL PROBLEM:  Calciphylaxis with nonhealing ulcer of leg [E83.59, L97.909]  Yes    Acute blood loss anemia [D62] 04/19/2024 Yes    Sacral decubitus ulcer [L89.159] 04/18/2024 Yes    Pressure injury of skin with infection [L89.90, L08.9] 04/18/2024 Yes    Sepsis [A41.9] 04/18/2024 Yes    Pyuria [R82.81] 04/18/2024 Yes    Secondary hyperparathyroidism [N25.81] 04/18/2024 Yes    Anemia in chronic kidney disease [N18.9, D63.1]  Yes    Asthma [J45.909]  Yes    GERD (gastroesophageal reflux disease) [K21.9]  Yes    ESRD (end stage renal disease) on dialysis [N18.6, Z99.2]  Not Applicable    SLE (systemic lupus erythematosus) [M32.9]  Yes    Secondary hypertension [I15.9]  Yes    Atypical anxiety disorder [F41.9]  Yes    Severe protein-calorie malnutrition [E43]  Yes      Problems Resolved During this Admission:       PlaN:  Continue the present management    Thank you for your consult. I will follow-up with patient. Please contact us if you have any additional questions.    Tony Lerma MD  Nephrology  [facility] - [facility]  [facility]

## 2024-04-22 NOTE — SUBJECTIVE & OBJECTIVE
I  Objective:     Vital Signs (Most Recent):  Temp: (!) 100.4 °F (38 °C) (04/22/24 0751)  Pulse: 105 (04/22/24 0751)  Resp: 16 (04/22/24 0751)  BP: 118/74 (04/22/24 0751)  SpO2: 97 % (04/22/24 0751) Vital Signs (24h Range):  Temp:  [98.2 °F (36.8 °C)-101.2 °F (38.4 °C)] 100.4 °F (38 °C)  Pulse:  [] 105  Resp:  [16-21] 16  SpO2:  [95 %-99 %] 97 %  BP: (101-126)/(67-80) 118/74       PHYSICAL EXAM:    GEN: NAD; alert and oriented x 3  CVS: regular rate and rhythm; no murmurs  RESP: clear to auscultation bilaterally; no rhonchi, rales, or wheezes noted  GI: soft, non-tender, non-distended; + bowel sounds  EXTR: bilateral LE wounds

## 2024-04-22 NOTE — PROGRESS NOTES
04/22/24 1011   Wound Care Follow Up   Wound Care Follow-up? Yes   Wound Care- Next Visit Date 04/26/24   Follow Up Plan Paula POC

## 2024-04-22 NOTE — PROGRESS NOTES
[facility] - [address] [place] [address] Medicine  Progress Note    Patient Name: Madhav Correa  MRN: [id number]  Patient Class: IP- Inpatient   Admission Date: 4/17/2024  Length of Stay: 4 days  Attending Physician: Sugar Garcia DO  Primary Care Provider: Rehab, [facility]        Subjective:     Principal Problem:Calciphylaxis with nonhealing ulcer of leg        HPI:  32 yo F presents to [facility] ED from [facility] for worsening wounds.  Patient is severely debilitated due to SLE.  She was diagnosed five years ago and has suffered secondary hypertension and  ESRD and is now on HD TTS.  She has also developed severe calciphylaxis during her daily dialysis when she was hospitalized at [facility].  Patient lives in [place] and was transferred for wound care to [place] because they were one of few nursing homes that would accept HD patients.      This patient has a tragic story and I have read the dc summary from 4/424 regarding the eight week course of thiosulfate and the calciphylaxis becoming worse.  She has developed severe wounds on her legs and buttocks (see photos).  She was on meropenum and levaquin in hospital for MSSA and pseudomonas.  She was discharged to the nursing home with wound care and levaquin.  Patient has worsening wounds and the nursing home sent her to [facility] ED for further evaluation and admission.  Prior to transfer from [facility], the discharging physician and the nephrologist and ID physician spoke to her about considering hospice/palliative care.  Patient was adamant about being a full code.  She has two children (14 yo and 12 yo).  She wants to raise her children.      She states that she is able to stand and walk some with assistance.  She does move her upper extremities and feeds herself but has generalized weakness of all extremities but says she is not familiar with the term transverse myelitis and is not sure why her  extremities so weak (3/5 of all four - able to lift gravity).  She has some atrophy noted but most of her leg problems may be from being bedridden with these wounds.  She has been on steroids previously for lupus flares but not chronically.       Overview/Hospital Course:  33 year old female with an extensive PMH presents with calciphylaxis and is ESRD.  Patient denies chest pain, shortness of breath, nausea, vomiting, or diarrhea.        Objective:     Vital Signs (Most Recent):  Temp: (!) 100.4 °F (38 °C) (04/22/24 0751)  Pulse: 105 (04/22/24 0751)  Resp: 16 (04/22/24 0751)  BP: 118/74 (04/22/24 0751)  SpO2: 97 % (04/22/24 0751) Vital Signs (24h Range):  Temp:  [98.2 °F (36.8 °C)-101.2 °F (38.4 °C)] 100.4 °F (38 °C)  Pulse:  [] 105  Resp:  [16-21] 16  SpO2:  [95 %-99 %] 97 %  BP: (101-126)/(67-80) 118/74       PHYSICAL EXAM:    GEN: NAD; alert and oriented x 3  CVS: regular rate and rhythm; no murmurs  RESP: clear to auscultation bilaterally; no rhonchi, rales, or wheezes noted  GI: soft, non-tender, non-distended; + bowel sounds  EXTR: bilateral LE wounds      Assessment/Plan:      * Calciphylaxis with nonhealing ulcer of leg  On Merrem and Flagyl; has Psudomonas and Citrobacter in wound cultures; negative blood cultures; followed by wound care; having fevers; will add Vancomycin for presumed UTI    Acute cystitis without hematuria  Add Vancomycin; on Merrem; having fevers; has Staph epidermidis in urine culture but may be contamination; added Vancomycin because she is having fevers      ESRD (end stage renal disease) on dialysis  On HD    Acute blood loss anemia  S/P transfusion; occult blood negative; s/p surgical intervention    Sacral decubitus ulcer  On Merrem; followed by wound care    SLE (systemic lupus erythematosus)  No active disease noted at this time    Anemia in chronic kidney disease  Hgb in the 8 range; s/p transfusion; will monitor Hgb           Sugar Garcia DO  Department of  [facility] Medicine   [facility] - [address] [place] [address]

## 2024-04-22 NOTE — PLAN OF CARE
Problem: Adult Inpatient Plan of Care  Goal: Plan of Care Review  4/22/2024 0044 by Marion Matias RN  Outcome: Ongoing, Progressing  4/22/2024 0042 by Marion Matias RN  Outcome: Ongoing, Progressing  Goal: Patient-Specific Goal (Individualized)  4/22/2024 0044 by Marion Matias RN  Outcome: Ongoing, Progressing  4/22/2024 0042 by Marion Matias RN  Outcome: Ongoing, Progressing  Goal: Absence of Hospital-Acquired Illness or Injury  4/22/2024 0044 by Marion Matias RN  Outcome: Ongoing, Progressing  4/22/2024 0042 by Marion Matias RN  Outcome: Ongoing, Progressing  Goal: Optimal Comfort and Wellbeing  4/22/2024 0044 by Marion Matias RN  Outcome: Ongoing, Progressing  4/22/2024 0042 by Marion Matias RN  Outcome: Ongoing, Progressing  Goal: Readiness for Transition of Care  Outcome: Ongoing, Progressing     Problem: Infection  Goal: Absence of Infection Signs and Symptoms  Outcome: Ongoing, Progressing     Problem: Fall Injury Risk  Goal: Absence of Fall and Fall-Related Injury  4/22/2024 0044 by Marion Matias RN  Outcome: Ongoing, Progressing  4/22/2024 0042 by Marion Matias RN  Outcome: Ongoing, Progressing

## 2024-04-22 NOTE — ASSESSMENT & PLAN NOTE
Add Vancomycin; on Merrem; having fevers; has Staph epidermidis in urine culture but may be contamination; added Vancomycin because she is having fevers

## 2024-04-22 NOTE — PROGRESS NOTES
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     Anemia in chronic kidney disease     Asthma     Atypical anxiety disorder     Calciphylaxis     severe failed 8 weeks of thiosulfate tx in [place]   see dc summary    Encounter for blood transfusion     multiple    ESRD (end stage renal disease) on dialysis     HD   TTS    GERD (gastroesophageal reflux disease)     Kwashiorkor     Secondary hypertension     SLE (systemic lupus erythematosus)        Social History     Socioeconomic History    Marital status: Unknown   Tobacco Use    Smoking status: Former     Current packs/day: 1.00     Average packs/day: 1 pack/day for 4.0 years (4.0 ttl pk-yrs)     Types: Cigarettes     Start date: 5/6/2020     Quit date: 5/6/2015    Smokeless tobacco: Never   Substance and Sexual Activity    Alcohol use: Never    Drug use: Never    Sexual activity: Not Currently     Social Determinants of Health     Financial Resource Strain: Low Risk  (4/18/2024)    Overall Financial Resource Strain (CARDIA)     Difficulty of Paying Living Expenses: Not hard at all   Food Insecurity: No Food Insecurity (4/18/2024)    Hunger Vital Sign     Worried About Running Out of Food in the Last Year: Never true     Ran Out of Food in the Last Year: Never true   Transportation Needs: No Transportation Needs (4/18/2024)    PRAPARE - Transportation     Lack of Transportation (Medical): No     Lack of Transportation (Non-Medical): No   Physical Activity: Inactive (4/18/2024)    Exercise Vital Sign     Days of Exercise per Week: 0 days     Minutes of Exercise per Session: 0 min   Stress: No Stress Concern Present (4/18/2024)    [place] [facility] - Occupational Stress Questionnaire     Feeling of Stress : Only a  little   Recent Concern: Stress - Stress Concern Present (2/4/2024)    Received from [facility] and [facility] of Occupational Health - Occupational Stress Questionnaire     Feeling of Stress : Rather much   Social Connections: Moderately Isolated (4/18/2024)    Social Connection and Isolation Panel [NHANES]     Frequency of Communication with Friends and Family: More than three times a week     Frequency of Social Gatherings with Friends and Family: More than three times a week     Attends Nondenominational Services: More than 4 times per year     Active Member of Clubs or Organizations: No     Attends Club or Organization Meetings: Never     Marital Status: Never    Housing Stability: Low Risk  (4/18/2024)    Housing Stability Vital Sign     Unable to Pay for Housing in the Last Year: No     Number of Times Moved in the Last Year: 0     Homeless in the Last Year: No       Precautions:     Allergies as of 04/17/2024 - Reviewed 04/17/2024   Allergen Reaction Noted    Iodine Swelling 10/20/2022       WOC Assessment Details/Treatment        04/22/24 1012   WOCN Assessment   WOCN Total Time (mins) 165   Visit Date 04/22/24   Visit Time 0815   Consult Type New   WOCN Speciality Wound   Wound pressure;other;At risk for pressure Injury  (ulcerations)   Number of Wounds 10   Intervention chart review;assessed;changed;applied;orders;team conference   Skin Interventions   Device Skin Pressure Protection absorbent pad utilized/changed;adhesive use limited;pressure points protected   Pressure Reduction Devices pressure-redistributing mattress utilized;alternating pressure pump (ADD)   Pressure Reduction Techniques sit time limited to 1 hour   Skin Protection adhesive use limited;incontinence pads utilized;protective footwear used   Positioning   Positioning/Transfer Devices pillows   Pressure Injury Prevention    Check Moisture Management Pad Done   Sacral Foam Dressing Apply         Wound 04/18/24 0500 Pressure Injury Right anterior;lower Leg #1   Date First Assessed/Time First Assessed: 04/18/24 0500   Present on Original Admission: Yes  Primary Wound Type: Pressure Injury  Side: Right  Orientation: anterior;lower  Location: Leg  Wound Number: #1  Is this injury device related?: No   Wound Image    Dressing Appearance Intact;Moist drainage   Drainage Amount Moderate   Appearance Red;Maroon;Moist;Bleeding;Granulating   Tissue loss description Full thickness   Red (%), Wound Tissue Color 80 %   Periwound Area Intact;Dry;Pink;Scar tissue   Wound Edges Defined;Irregular;Open   Wound Length (cm) 6.5 cm   Wound Width (cm) 3 cm   Wound Surface Area (cm^2) 19.5 cm^2   Care Cleansed with:;Antimicrobial agent;Wound cleanser;Applied:;Other (see comments)  (Silvadene)   Dressing Changed;Silicone;Island/border;Foam   Dressing Change Due 04/23/24        Wound 04/18/24 1020 Incision Sacral spine   Date First Assessed/Time First Assessed: 04/18/24 1020   Present on Original Admission: Yes  Primary Wound Type: Incision  Location: Sacral spine  Additional Comments: debridement, no closure   Wound Image    Dressing Appearance Intact;Moist drainage   Drainage Amount Large   Drainage Characteristics/Odor Creamy;Serous;No odor   Appearance Red;Maroon;Moist;Granulating;Muscle   Periwound Area Denuded;Purple   Wound Edges Defined;Irregular;Open   Dressing Changed;Applied;Gauze;Silver;Hydrofiber;Silicone;Island/border;Foam        Wound 04/18/24 1024 Incision Right posterior Thigh   Date First Assessed/Time First Assessed: 04/18/24 1024   Present on Original Admission: Yes  Primary Wound Type: Incision  Side: Right  Orientation: posterior  Location: Thigh  Additional Comments: debridement, no closure   Wound Image    Dressing Appearance Intact;Moist drainage   Drainage Amount Large   Drainage Characteristics/Odor Creamy;Tan;No odor   Appearance Red;Tan;Slough;Moist;Granulating;Adipose   Periwound Area Intact;Dry;Maroon  "  Wound Edges Defined;Dehisced;Open   Care Cleansed with:;Antimicrobial agent;Sterile normal saline;Applied:;Other (see comments)  (Siilvadene)   Dressing Changed;Gauze;Silver;Hydrofiber;Silicone;Island/border;Foam        Wound 04/18/24 1029 Incision Left anterior;lateral Thigh   Date First Assessed/Time First Assessed: 04/18/24 1029   Present on Original Admission: Yes  Primary Wound Type: Incision  Side: Left  Orientation: anterior;lateral  Location: Thigh  Additional Comments: debridement, no closure   Wound Image    Dressing Appearance Intact;Moist drainage   Drainage Amount Large   Drainage Characteristics/Odor Green;Creamy;Purulent;No odor   Appearance Tan;Slough;Eschar;Necrotic;Moist;Granulating   Periwound Area Intact;Dry   Wound Edges Defined;Irregular;Open   Care Cleansed with:;Antimicrobial agent;Wound cleanser;Applied:;Other (see comments)  (silvadene)   Dressing Gauze;Silver;Hydrofiber;Silicone;Island/border;Foam   Dressing Change Due 04/23/24        Wound 04/18/24 1050 Incision Right anterior Thigh   Date First Assessed/Time First Assessed: 04/18/24 1050   Present on Original Admission: Yes  Primary Wound Type: Incision  Side: Right  Orientation: anterior  Location: Thigh  Additional Comments: debridement, no closure   Wound Image    Dressing Appearance Intact;Moist drainage   Drainage Amount Large   Drainage Characteristics/Odor Creamy;Tan;No odor   Appearance Red;Pink;Tan;Moist;Granulating   Periwound Area Intact;Dry   Wound Edges Defined;Irregular;Open   Care Cleansed with:;Antimicrobial agent;Wound cleanser;Applied:;Other (see comments)  (Silvadene)   Dressing Gauze;Silver;Hydrofiber;Silicone;Island/border;Foam   Dressing Change Due 04/23/24     [facility] Team consulted for "Altered Skin Integrity BLE and sacrum"    Narrative: Pt alert and oriented.  Pain medication received prior to dressing changes.  Pt report pain during assessment and cleansing/dressing of wounds.      Active Wounds and " Recommendations:      Stage 4 Pressure Injury Sacral Spine      Silvadene, Silver hydrofiber, silcone foam     Ulcerations R & L upper thighs, anterior      Silvadene, Silver hydrofiber, silicone foam     Ulceration R lateral calf      Silvadene    Goals for Wound Healing: Promote moist wound healing, Manage drainage, Apply antimicrobial, Reduce pain, Reduce bioburden, and Educate on proper wound management post D/C     Barriers to Wound Healing: multiple co-morbidities poor vascular supply heavy drainage decreased granulation tissue large surface area large volume fragile skin infection    Orders placed.    Thank you for the consult.     We will continue to follow. See additional note under Notes Tab for tentative f/u plan/dates.    04/22/2024

## 2024-04-22 NOTE — PLAN OF CARE
Chart reviewed. Pt continues to receive abx, wound care, and HD TTS. SS will continue to follow for anticipated discharge needs.

## 2024-04-23 LAB
ABO + RH BLD: NORMAL
ALBUMIN SERPL BCP-MCNC: 1.5 G/DL (ref 3.5–5)
ANION GAP SERPL CALCULATED.3IONS-SCNC: 12 MMOL/L (ref 7–16)
ANISOCYTOSIS BLD QL SMEAR: ABNORMAL
BACTERIA BLD CULT: NORMAL
BACTERIA BLD CULT: NORMAL
BASOPHILS # BLD AUTO: 0.02 K/UL (ref 0–0.2)
BASOPHILS NFR BLD AUTO: 0.4 % (ref 0–1)
BLD PROD TYP BPU: NORMAL
BLOOD UNIT EXPIRATION DATE: NORMAL
BLOOD UNIT TYPE CODE: 6200
BUN SERPL-MCNC: 42 MG/DL (ref 7–18)
BUN/CREAT SERPL: 7 (ref 6–20)
CALCIUM SERPL-MCNC: 8.4 MG/DL (ref 8.5–10.1)
CHLORIDE SERPL-SCNC: 104 MMOL/L (ref 98–107)
CO2 SERPL-SCNC: 25 MMOL/L (ref 21–32)
CREAT SERPL-MCNC: 5.89 MG/DL (ref 0.55–1.02)
CROSSMATCH INTERPRETATION: NORMAL
DIFFERENTIAL METHOD BLD: ABNORMAL
DISPENSE STATUS: NORMAL
EGFR (NO RACE VARIABLE) (RUSH/TITUS): 9 ML/MIN/1.73M2
EOSINOPHIL # BLD AUTO: 0.38 K/UL (ref 0–0.5)
EOSINOPHIL NFR BLD AUTO: 7.4 % (ref 1–4)
EOSINOPHIL NFR BLD MANUAL: 6 % (ref 1–4)
ERYTHROCYTE [DISTWIDTH] IN BLOOD BY AUTOMATED COUNT: 19.7 % (ref 11.5–14.5)
GLUCOSE SERPL-MCNC: 107 MG/DL (ref 74–106)
HCT VFR BLD AUTO: 22.3 % (ref 38–47)
HGB BLD-MCNC: 6.8 G/DL (ref 12–16)
HYPOCHROMIA BLD QL SMEAR: ABNORMAL
IMM GRANULOCYTES # BLD AUTO: 0.07 K/UL (ref 0–0.04)
IMM GRANULOCYTES NFR BLD: 1.4 % (ref 0–0.4)
INDIRECT COOMBS: NORMAL
LYMPHOCYTES # BLD AUTO: 1.65 K/UL (ref 1–4.8)
LYMPHOCYTES NFR BLD AUTO: 32.2 % (ref 27–41)
LYMPHOCYTES NFR BLD MANUAL: 34 % (ref 27–41)
MCH RBC QN AUTO: 27.8 PG (ref 27–31)
MCHC RBC AUTO-ENTMCNC: 30.5 G/DL (ref 32–36)
MCV RBC AUTO: 91 FL (ref 80–96)
MONOCYTES # BLD AUTO: 0.78 K/UL (ref 0–0.8)
MONOCYTES NFR BLD AUTO: 15.2 % (ref 2–6)
MONOCYTES NFR BLD MANUAL: 10 % (ref 2–6)
MPC BLD CALC-MCNC: 10.2 FL (ref 9.4–12.4)
NEUTROPHILS # BLD AUTO: 2.22 K/UL (ref 1.8–7.7)
NEUTROPHILS NFR BLD AUTO: 43.4 % (ref 53–65)
NEUTS BAND NFR BLD MANUAL: 1 % (ref 1–5)
NEUTS SEG NFR BLD MANUAL: 49 % (ref 50–62)
NRBC # BLD AUTO: 0 X10E3/UL
NRBC, AUTO (.00): 0 %
OVALOCYTES BLD QL SMEAR: ABNORMAL
PHOSPHATE SERPL-MCNC: 3.3 MG/DL (ref 2.5–4.5)
PLATELET # BLD AUTO: 206 K/UL (ref 150–400)
PLATELET MORPHOLOGY: NORMAL
POLYCHROMASIA BLD QL SMEAR: ABNORMAL
POTASSIUM SERPL-SCNC: 4.9 MMOL/L (ref 3.5–5.1)
RBC # BLD AUTO: 2.45 M/UL (ref 4.2–5.4)
RH BLD: NORMAL
SODIUM SERPL-SCNC: 136 MMOL/L (ref 136–145)
SPECIMEN OUTDATE: NORMAL
UNIT NUMBER: NORMAL
WBC # BLD AUTO: 5.12 K/UL (ref 4.5–11)

## 2024-04-23 PROCEDURE — 86901 BLOOD TYPING SEROLOGIC RH(D): CPT | Performed by: HOSPITALIST

## 2024-04-23 PROCEDURE — 63600175 PHARM REV CODE 636 W HCPCS: Performed by: FAMILY MEDICINE

## 2024-04-23 PROCEDURE — 85025 COMPLETE CBC W/AUTO DIFF WBC: CPT | Performed by: STUDENT IN AN ORGANIZED HEALTH CARE EDUCATION/TRAINING PROGRAM

## 2024-04-23 PROCEDURE — 86923 COMPATIBILITY TEST ELECTRIC: CPT | Performed by: HOSPITALIST

## 2024-04-23 PROCEDURE — 25000003 PHARM REV CODE 250: Performed by: FAMILY MEDICINE

## 2024-04-23 PROCEDURE — P9016 RBC LEUKOCYTES REDUCED: HCPCS | Performed by: HOSPITALIST

## 2024-04-23 PROCEDURE — 25000003 PHARM REV CODE 250: Performed by: STUDENT IN AN ORGANIZED HEALTH CARE EDUCATION/TRAINING PROGRAM

## 2024-04-23 PROCEDURE — 80069 RENAL FUNCTION PANEL: CPT | Performed by: STUDENT IN AN ORGANIZED HEALTH CARE EDUCATION/TRAINING PROGRAM

## 2024-04-23 PROCEDURE — 90935 HEMODIALYSIS ONE EVALUATION: CPT

## 2024-04-23 PROCEDURE — 11000001 HC ACUTE MED/SURG PRIVATE ROOM

## 2024-04-23 PROCEDURE — 36415 COLL VENOUS BLD VENIPUNCTURE: CPT | Performed by: HOSPITALIST

## 2024-04-23 PROCEDURE — 25000003 PHARM REV CODE 250: Performed by: INTERNAL MEDICINE

## 2024-04-23 PROCEDURE — 87070 CULTURE OTHR SPECIMN AEROBIC: CPT | Performed by: HOSPITALIST

## 2024-04-23 PROCEDURE — 63600175 PHARM REV CODE 636 W HCPCS: Mod: JZ,EC,JG | Performed by: INTERNAL MEDICINE

## 2024-04-23 PROCEDURE — 99232 SBSQ HOSP IP/OBS MODERATE 35: CPT | Mod: ,,, | Performed by: HOSPITALIST

## 2024-04-23 PROCEDURE — 36415 COLL VENOUS BLD VENIPUNCTURE: CPT | Performed by: STUDENT IN AN ORGANIZED HEALTH CARE EDUCATION/TRAINING PROGRAM

## 2024-04-23 PROCEDURE — 63600175 PHARM REV CODE 636 W HCPCS: Performed by: HOSPITALIST

## 2024-04-23 PROCEDURE — 25000003 PHARM REV CODE 250: Performed by: HOSPITALIST

## 2024-04-23 RX ORDER — HYDROCODONE BITARTRATE AND ACETAMINOPHEN 500; 5 MG/1; MG/1
TABLET ORAL
Status: DISCONTINUED | OUTPATIENT
Start: 2024-04-23 | End: 2024-04-30 | Stop reason: HOSPADM

## 2024-04-23 RX ADMIN — METRONIDAZOLE 500 MG: 500 TABLET ORAL at 08:04

## 2024-04-23 RX ADMIN — SEVELAMER CARBONATE 1600 MG: 800 TABLET, FILM COATED ORAL at 04:04

## 2024-04-23 RX ADMIN — BUPROPION HYDROCHLORIDE 75 MG: 75 TABLET, FILM COATED ORAL at 08:04

## 2024-04-23 RX ADMIN — HEPARIN SODIUM 5000 UNITS: 5000 INJECTION, SOLUTION INTRAVENOUS; SUBCUTANEOUS at 08:04

## 2024-04-23 RX ADMIN — OXYCODONE 15 MG: 5 TABLET ORAL at 01:04

## 2024-04-23 RX ADMIN — SODIUM THIOSULFATE 25 G: 250 INJECTION, SOLUTION INTRAVENOUS at 11:04

## 2024-04-23 RX ADMIN — SEVELAMER CARBONATE 1600 MG: 800 TABLET, FILM COATED ORAL at 08:04

## 2024-04-23 RX ADMIN — SENNOSIDES AND DOCUSATE SODIUM 2 TABLET: 8.6; 5 TABLET ORAL at 08:04

## 2024-04-23 RX ADMIN — SILVER SULFADIAZINE: 10 CREAM TOPICAL at 08:04

## 2024-04-23 RX ADMIN — CARVEDILOL 25 MG: 25 TABLET, FILM COATED ORAL at 04:04

## 2024-04-23 RX ADMIN — MUPIROCIN: 20 OINTMENT TOPICAL at 08:04

## 2024-04-23 RX ADMIN — OLANZAPINE 5 MG: 5 TABLET, FILM COATED ORAL at 08:04

## 2024-04-23 RX ADMIN — OXYCODONE 15 MG: 5 TABLET ORAL at 04:04

## 2024-04-23 RX ADMIN — EPOETIN ALFA 10000 UNITS: 10000 SOLUTION INTRAVENOUS; SUBCUTANEOUS at 10:04

## 2024-04-23 RX ADMIN — SEVELAMER CARBONATE 1600 MG: 800 TABLET, FILM COATED ORAL at 01:04

## 2024-04-23 RX ADMIN — CARVEDILOL 25 MG: 25 TABLET, FILM COATED ORAL at 08:04

## 2024-04-23 RX ADMIN — ACETAMINOPHEN 1000 MG: 500 TABLET ORAL at 04:04

## 2024-04-23 RX ADMIN — FAMOTIDINE 20 MG: 20 TABLET ORAL at 08:04

## 2024-04-23 RX ADMIN — HYDROMORPHONE HYDROCHLORIDE 1 MG: 2 INJECTION, SOLUTION INTRAMUSCULAR; INTRAVENOUS; SUBCUTANEOUS at 04:04

## 2024-04-23 RX ADMIN — MEROPENEM 500 MG: 500 INJECTION, POWDER, FOR SOLUTION INTRAVENOUS at 04:04

## 2024-04-23 RX ADMIN — HYDROMORPHONE HYDROCHLORIDE 1 MG: 2 INJECTION, SOLUTION INTRAMUSCULAR; INTRAVENOUS; SUBCUTANEOUS at 01:04

## 2024-04-23 RX ADMIN — HYDROMORPHONE HYDROCHLORIDE 1 MG: 2 INJECTION, SOLUTION INTRAMUSCULAR; INTRAVENOUS; SUBCUTANEOUS at 08:04

## 2024-04-23 RX ADMIN — ISOSORBIDE MONONITRATE 30 MG: 30 TABLET, EXTENDED RELEASE ORAL at 08:04

## 2024-04-23 RX ADMIN — OXYCODONE 15 MG: 5 TABLET ORAL at 08:04

## 2024-04-23 NOTE — PLAN OF CARE
Betito called to check on pt, updates faxed per request, pt is on bedhold day 7 from [facility], ss to inform [facility] as soon as dc date is known

## 2024-04-23 NOTE — PLAN OF CARE
Problem: Skin Injury Risk Increased  Goal: Skin Health and Integrity  4/23/2024 0447 by Mariel Lam RN  Outcome: Progressing  4/23/2024 0345 by Mariel Lam RN  Outcome: Progressing     Problem: Adult Inpatient Plan of Care  Goal: Plan of Care Review  4/23/2024 0447 by Mariel Lam RN  Outcome: Progressing  4/23/2024 0346 by Mariel Lam RN  Outcome: Progressing  4/23/2024 0345 by Mariel Lam RN  Outcome: Progressing  Goal: Patient-Specific Goal (Individualized)  4/23/2024 0447 by Mariel Lam RN  Outcome: Progressing  4/23/2024 0346 by Mariel Lam RN  Outcome: Progressing  4/23/2024 0345 by Mariel Lam RN  Outcome: Progressing  Goal: Absence of Hospital-Acquired Illness or Injury  4/23/2024 0346 by Mariel Lam RN  Outcome: Progressing  4/23/2024 0345 by Mariel Lam RN  Outcome: Progressing  Goal: Optimal Comfort and Wellbeing  4/23/2024 0346 by Mariel Lam RN  Outcome: Progressing  4/23/2024 0345 by Mariel Lam RN  Outcome: Progressing  Goal: Readiness for Transition of Care  4/23/2024 0447 by Mariel Lam RN  Outcome: Progressing  4/23/2024 0345 by Mariel Lam RN  Outcome: Progressing     Problem: Infection  Goal: Absence of Infection Signs and Symptoms  4/23/2024 0447 by Mariel Lam RN  Outcome: Progressing  4/23/2024 0345 by Mariel Lam RN  Outcome: Progressing

## 2024-04-23 NOTE — PROGRESS NOTES
Patient denies shortness of breath.  Review of systems musculoskeletal-patient continue with complaints of pain in her wounds , GI-patient denies nausea vomiting or diarrhea    Physical exam general patient in no acute distress     Vitals:    04/23/24 1130 04/23/24 1200 04/23/24 1230 04/23/24 1332   BP: 123/71 123/74 131/81 (!) 134/93   BP Location: Right arm Right arm Right arm    Patient Position: Lying Lying Lying    Pulse: 97 98 97 102   Resp: 18 16 16 16   Temp:    99.3 °F (37.4 °C)   TempSrc:    Oral   SpO2:    97%   Weight:       Height:          Assessment/plan 1.  ESRD-continue HD support  2.  Calciphylaxis-continue sodium thiosulfate  3.  Anemia-patient's hematocrit is 23%, she received a unit of blood today, continue EPO  4.  Pyuria   5. Leg wounds-continue wound care and antibiotics  6.  Sacral wound-continue wound care  7.  Lupus

## 2024-04-23 NOTE — NURSING
Dr. Rodrigues requested patient be re-evaluated by Dr. Gage. Spoke with Dr. Gage, patient to be NPO after midnight and he will come evaluate her tomorrow morning(4/24/24).

## 2024-04-23 NOTE — PROGRESS NOTES
[facility] - [address] [place] [address] Medicine  Progress Note    Patient Name: Madhav Correa  MRN: [id number]  Patient Class: IP- Inpatient   Admission Date: 4/17/2024  Length of Stay: 5 days  Attending Physician: Sugar Garcia DO  Primary Care Provider: Rehab, [facility]        Subjective:     Principal Problem:Calciphylaxis with nonhealing ulcer of leg        HPI:  32 yo F presents to [facility] ED from [facility] for worsening wounds.  Patient is severely debilitated due to SLE.  She was diagnosed five years ago and has suffered secondary hypertension and  ESRD and is now on HD TTS.  She has also developed severe calciphylaxis during her daily dialysis when she was hospitalized at [facility].  Patient lives in [place] and was transferred for wound care to [place] because they were one of few nursing homes that would accept HD patients.      This patient has a tragic story and I have read the dc summary from 4/424 regarding the eight week course of thiosulfate and the calciphylaxis becoming worse.  She has developed severe wounds on her legs and buttocks (see photos).  She was on meropenum and levaquin in hospital for MSSA and pseudomonas.  She was discharged to the nursing home with wound care and levaquin.  Patient has worsening wounds and the nursing home sent her to [facility] ED for further evaluation and admission.  Prior to transfer from [facility], the discharging physician and the nephrologist and ID physician spoke to her about considering hospice/palliative care.  Patient was adamant about being a full code.  She has two children (14 yo and 10 yo).  She wants to raise her children.      She states that she is able to stand and walk some with assistance.  She does move her upper extremities and feeds herself but has generalized weakness of all extremities but says she is not familiar with the term transverse myelitis and is not sure why her  extremities so weak (3/5 of all four - able to lift gravity).  She has some atrophy noted but most of her leg problems may be from being bedridden with these wounds.  She has been on steroids previously for lupus flares but not chronically.       Overview/Hospital Course:  33 year old female with an extensive PMH presents with calciphylaxis and is ESRD.  Patient denies chest pain, shortness of breath, nausea, vomiting, or diarrhea.      Vitals:    04/23/24 1255 04/23/24 1257 04/23/24 1332 04/23/24 1336   BP: 127/72 132/80 (!) 134/93    BP Location: Right arm Right arm     Patient Position: Lying Lying     Pulse: 98 98 102    Resp: 16 16 16 18   Temp:  98.9 °F (37.2 °C) 99.3 °F (37.4 °C)    TempSrc:  Oral Oral    SpO2:   97%    Weight:       Height:             PHYSICAL EXAMINATION:    GEN: NAD; alert and oriented x 3  CVS: regular rate and rhythm  RESP: normal respiratory effort; no audible wheezing noted  GI: non-distended  EXTR: +LE wounds      Assessment/Plan:      * Calciphylaxis with nonhealing ulcer of leg  On Merrem and Flagyl; has Psudomonas and Citrobacter in wound cultures; negative blood cultures; followed by wound care; ID consult pending; On sodium thiosulfate    Acute cystitis without hematuria  On Vancomycin; has Staph epidermidis in urine culture but may be contamination    ESRD (end stage renal disease) on dialysis  On HD    Acute blood loss anemia  S/P transfusion on dialysis; occult blood negative; s/p surgical intervention    Sacral decubitus ulcer  On Merrem; followed by wound care    SLE (systemic lupus erythematosus)  No active disease noted at this time    Anemia in chronic kidney disease  S/p transfusion on dialysis; monitor Hgb          Sugar Garcia DO  Department of Hospital Medicine   [facility]

## 2024-04-23 NOTE — PLAN OF CARE
Problem: Skin Injury Risk Increased  Goal: Skin Health and Integrity  Outcome: Progressing     Problem: Adult Inpatient Plan of Care  Goal: Plan of Care Review  4/23/2024 0346 by Mariel Lam RN  Outcome: Progressing  4/23/2024 0345 by Mariel Lam RN  Outcome: Progressing  Goal: Patient-Specific Goal (Individualized)  4/23/2024 0346 by Mariel Lam RN  Outcome: Progressing  4/23/2024 0345 by Mariel Lam RN  Outcome: Progressing  Goal: Absence of Hospital-Acquired Illness or Injury  4/23/2024 0346 by Mariel Lam RN  Outcome: Progressing  4/23/2024 0345 by Mariel Lam RN  Outcome: Progressing  Goal: Optimal Comfort and Wellbeing  4/23/2024 0346 by Mariel Lam RN  Outcome: Progressing  4/23/2024 0345 by Mariel Lam RN  Outcome: Progressing  Goal: Readiness for Transition of Care  Outcome: Progressing     Problem: Infection  Goal: Absence of Infection Signs and Symptoms  Outcome: Progressing

## 2024-04-23 NOTE — PLAN OF CARE
Problem: Skin Injury Risk Increased  Goal: Skin Health and Integrity  Outcome: Progressing     Problem: Adult Inpatient Plan of Care  Goal: Plan of Care Review  Outcome: Progressing  Goal: Patient-Specific Goal (Individualized)  Outcome: Progressing  Goal: Absence of Hospital-Acquired Illness or Injury  Outcome: Progressing  Goal: Optimal Comfort and Wellbeing  Outcome: Progressing  Goal: Readiness for Transition of Care  Outcome: Progressing     Problem: Infection  Goal: Absence of Infection Signs and Symptoms  Outcome: Progressing

## 2024-04-23 NOTE — ASSESSMENT & PLAN NOTE
On Merrem and Flagyl; has Psudomonas and Citrobacter in wound cultures; negative blood cultures; followed by wound care; ID consult pending; On sodium thiosulfate

## 2024-04-24 ENCOUNTER — ANESTHESIA (OUTPATIENT)
Dept: SURGERY | Facility: HOSPITAL | Age: 34
DRG: 853 | End: 2024-04-24
Payer: MEDICARE

## 2024-04-24 ENCOUNTER — ANESTHESIA EVENT (OUTPATIENT)
Dept: SURGERY | Facility: HOSPITAL | Age: 34
DRG: 853 | End: 2024-04-24
Payer: MEDICARE

## 2024-04-24 LAB
ALBUMIN SERPL BCP-MCNC: 1.6 G/DL (ref 3.5–5)
ANION GAP SERPL CALCULATED.3IONS-SCNC: 12 MMOL/L (ref 7–16)
ANISOCYTOSIS BLD QL SMEAR: ABNORMAL
BASOPHILS # BLD AUTO: 0.01 K/UL (ref 0–0.2)
BASOPHILS NFR BLD AUTO: 0.2 % (ref 0–1)
BUN SERPL-MCNC: 30 MG/DL (ref 7–18)
BUN/CREAT SERPL: 7 (ref 6–20)
CALCIUM SERPL-MCNC: 8.2 MG/DL (ref 8.5–10.1)
CHLORIDE SERPL-SCNC: 105 MMOL/L (ref 98–107)
CO2 SERPL-SCNC: 26 MMOL/L (ref 21–32)
CREAT SERPL-MCNC: 4.22 MG/DL (ref 0.55–1.02)
DIFFERENTIAL METHOD BLD: ABNORMAL
EGFR (NO RACE VARIABLE) (RUSH/TITUS): 14 ML/MIN/1.73M2
EOSINOPHIL # BLD AUTO: 0.35 K/UL (ref 0–0.5)
EOSINOPHIL NFR BLD AUTO: 7.4 % (ref 1–4)
EOSINOPHIL NFR BLD MANUAL: 4 % (ref 1–4)
ERYTHROCYTE [DISTWIDTH] IN BLOOD BY AUTOMATED COUNT: 19.1 % (ref 11.5–14.5)
GLUCOSE SERPL-MCNC: 102 MG/DL (ref 74–106)
GLUCOSE SERPL-MCNC: 76 MG/DL (ref 70–105)
HCT VFR BLD AUTO: 25.5 % (ref 38–47)
HGB BLD-MCNC: 7.8 G/DL (ref 12–16)
HYPOCHROMIA BLD QL SMEAR: ABNORMAL
IMM GRANULOCYTES # BLD AUTO: 0.04 K/UL (ref 0–0.04)
IMM GRANULOCYTES NFR BLD: 0.8 % (ref 0–0.4)
LYMPHOCYTES # BLD AUTO: 1.7 K/UL (ref 1–4.8)
LYMPHOCYTES NFR BLD AUTO: 35.7 % (ref 27–41)
LYMPHOCYTES NFR BLD MANUAL: 33 % (ref 27–41)
MCH RBC QN AUTO: 27.9 PG (ref 27–31)
MCHC RBC AUTO-ENTMCNC: 30.6 G/DL (ref 32–36)
MCV RBC AUTO: 91.1 FL (ref 80–96)
MONOCYTES # BLD AUTO: 0.8 K/UL (ref 0–0.8)
MONOCYTES NFR BLD AUTO: 16.8 % (ref 2–6)
MONOCYTES NFR BLD MANUAL: 11 % (ref 2–6)
MPC BLD CALC-MCNC: 9.7 FL (ref 9.4–12.4)
NEUTROPHILS # BLD AUTO: 1.86 K/UL (ref 1.8–7.7)
NEUTROPHILS NFR BLD AUTO: 39.1 % (ref 53–65)
NEUTS BAND NFR BLD MANUAL: 11 % (ref 1–5)
NEUTS SEG NFR BLD MANUAL: 42 % (ref 50–62)
NRBC # BLD AUTO: 0 X10E3/UL
NRBC, AUTO (.00): 0 %
OVALOCYTES BLD QL SMEAR: ABNORMAL
PHOSPHATE SERPL-MCNC: 2.4 MG/DL (ref 2.5–4.5)
PLATELET # BLD AUTO: 205 K/UL (ref 150–400)
PLATELET MORPHOLOGY: NORMAL
POLYCHROMASIA BLD QL SMEAR: ABNORMAL
POTASSIUM SERPL-SCNC: 4.5 MMOL/L (ref 3.5–5.1)
RBC # BLD AUTO: 2.8 M/UL (ref 4.2–5.4)
SCHISTOCYTES BLD QL AUTO: ABNORMAL
SODIUM SERPL-SCNC: 138 MMOL/L (ref 136–145)
WBC # BLD AUTO: 4.76 K/UL (ref 4.5–11)

## 2024-04-24 PROCEDURE — 93010 ELECTROCARDIOGRAM REPORT: CPT | Mod: ,,, | Performed by: HOSPITALIST

## 2024-04-24 PROCEDURE — 27000716 HC OXISENSOR PROBE, ANY SIZE: Performed by: ANESTHESIOLOGY

## 2024-04-24 PROCEDURE — 27000655: Performed by: ANESTHESIOLOGY

## 2024-04-24 PROCEDURE — 99232 SBSQ HOSP IP/OBS MODERATE 35: CPT | Mod: ,,, | Performed by: HOSPITALIST

## 2024-04-24 PROCEDURE — 11045 DBRDMT SUBQ TISS EACH ADDL: CPT | Mod: ,,, | Performed by: STUDENT IN AN ORGANIZED HEALTH CARE EDUCATION/TRAINING PROGRAM

## 2024-04-24 PROCEDURE — 71000033 HC RECOVERY, INTIAL HOUR: Performed by: STUDENT IN AN ORGANIZED HEALTH CARE EDUCATION/TRAINING PROGRAM

## 2024-04-24 PROCEDURE — 85025 COMPLETE CBC W/AUTO DIFF WBC: CPT | Performed by: STUDENT IN AN ORGANIZED HEALTH CARE EDUCATION/TRAINING PROGRAM

## 2024-04-24 PROCEDURE — 71000039 HC RECOVERY, EACH ADD'L HOUR: Performed by: STUDENT IN AN ORGANIZED HEALTH CARE EDUCATION/TRAINING PROGRAM

## 2024-04-24 PROCEDURE — 93005 ELECTROCARDIOGRAM TRACING: CPT

## 2024-04-24 PROCEDURE — 63600175 PHARM REV CODE 636 W HCPCS: Performed by: HOSPITALIST

## 2024-04-24 PROCEDURE — 37000009 HC ANESTHESIA EA ADD 15 MINS: Performed by: STUDENT IN AN ORGANIZED HEALTH CARE EDUCATION/TRAINING PROGRAM

## 2024-04-24 PROCEDURE — 27000510 HC BLANKET BAIR HUGGER ANY SIZE: Performed by: ANESTHESIOLOGY

## 2024-04-24 PROCEDURE — 63600175 PHARM REV CODE 636 W HCPCS: Performed by: FAMILY MEDICINE

## 2024-04-24 PROCEDURE — 27000284 HC CANNULA NASAL: Performed by: ANESTHESIOLOGY

## 2024-04-24 PROCEDURE — 25000003 PHARM REV CODE 250: Performed by: STUDENT IN AN ORGANIZED HEALTH CARE EDUCATION/TRAINING PROGRAM

## 2024-04-24 PROCEDURE — 36000707: Performed by: STUDENT IN AN ORGANIZED HEALTH CARE EDUCATION/TRAINING PROGRAM

## 2024-04-24 PROCEDURE — 36415 COLL VENOUS BLD VENIPUNCTURE: CPT | Performed by: STUDENT IN AN ORGANIZED HEALTH CARE EDUCATION/TRAINING PROGRAM

## 2024-04-24 PROCEDURE — D9220A PRA ANESTHESIA: Mod: CRNA,,, | Performed by: NURSE ANESTHETIST, CERTIFIED REGISTERED

## 2024-04-24 PROCEDURE — 80069 RENAL FUNCTION PANEL: CPT | Performed by: STUDENT IN AN ORGANIZED HEALTH CARE EDUCATION/TRAINING PROGRAM

## 2024-04-24 PROCEDURE — 88304 TISSUE EXAM BY PATHOLOGIST: CPT | Mod: 26,,, | Performed by: PATHOLOGY

## 2024-04-24 PROCEDURE — 27000177 HC AIRWAY, LARYNGEAL MASK: Performed by: ANESTHESIOLOGY

## 2024-04-24 PROCEDURE — 37000008 HC ANESTHESIA 1ST 15 MINUTES: Performed by: STUDENT IN AN ORGANIZED HEALTH CARE EDUCATION/TRAINING PROGRAM

## 2024-04-24 PROCEDURE — 88304 TISSUE EXAM BY PATHOLOGIST: CPT | Mod: TC,SUR | Performed by: STUDENT IN AN ORGANIZED HEALTH CARE EDUCATION/TRAINING PROGRAM

## 2024-04-24 PROCEDURE — 97598 DBRDMT OPN WND ADDL 20CM/<: CPT | Mod: 59,,, | Performed by: STUDENT IN AN ORGANIZED HEALTH CARE EDUCATION/TRAINING PROGRAM

## 2024-04-24 PROCEDURE — 5A1D70Z PERFORMANCE OF URINARY FILTRATION, INTERMITTENT, LESS THAN 6 HOURS PER DAY: ICD-10-PCS | Performed by: STUDENT IN AN ORGANIZED HEALTH CARE EDUCATION/TRAINING PROGRAM

## 2024-04-24 PROCEDURE — 0HB6XZZ EXCISION OF BACK SKIN, EXTERNAL APPROACH: ICD-10-PCS | Performed by: STUDENT IN AN ORGANIZED HEALTH CARE EDUCATION/TRAINING PROGRAM

## 2024-04-24 PROCEDURE — 82962 GLUCOSE BLOOD TEST: CPT

## 2024-04-24 PROCEDURE — 36000706: Performed by: STUDENT IN AN ORGANIZED HEALTH CARE EDUCATION/TRAINING PROGRAM

## 2024-04-24 PROCEDURE — D9220A PRA ANESTHESIA: Mod: ANES,,, | Performed by: ANESTHESIOLOGY

## 2024-04-24 PROCEDURE — 25000003 PHARM REV CODE 250: Performed by: HOSPITALIST

## 2024-04-24 PROCEDURE — 63600175 PHARM REV CODE 636 W HCPCS: Performed by: NURSE ANESTHETIST, CERTIFIED REGISTERED

## 2024-04-24 PROCEDURE — 11042 DBRDMT SUBQ TIS 1ST 20SQCM/<: CPT | Mod: ,,, | Performed by: STUDENT IN AN ORGANIZED HEALTH CARE EDUCATION/TRAINING PROGRAM

## 2024-04-24 PROCEDURE — 97597 DBRDMT OPN WND 1ST 20 CM/<: CPT | Mod: 59,,, | Performed by: STUDENT IN AN ORGANIZED HEALTH CARE EDUCATION/TRAINING PROGRAM

## 2024-04-24 PROCEDURE — 25000003 PHARM REV CODE 250: Performed by: NURSE ANESTHETIST, CERTIFIED REGISTERED

## 2024-04-24 PROCEDURE — 11000001 HC ACUTE MED/SURG PRIVATE ROOM

## 2024-04-24 PROCEDURE — 63600175 PHARM REV CODE 636 W HCPCS: Performed by: ANESTHESIOLOGY

## 2024-04-24 RX ORDER — MEPERIDINE HYDROCHLORIDE 25 MG/ML
25 INJECTION INTRAMUSCULAR; INTRAVENOUS; SUBCUTANEOUS EVERY 10 MIN PRN
Status: DISCONTINUED | OUTPATIENT
Start: 2024-04-24 | End: 2024-04-24 | Stop reason: HOSPADM

## 2024-04-24 RX ORDER — ONDANSETRON HYDROCHLORIDE 2 MG/ML
4 INJECTION, SOLUTION INTRAVENOUS DAILY PRN
Status: DISCONTINUED | OUTPATIENT
Start: 2024-04-24 | End: 2024-04-24 | Stop reason: HOSPADM

## 2024-04-24 RX ORDER — ONDANSETRON HYDROCHLORIDE 2 MG/ML
INJECTION, SOLUTION INTRAVENOUS
Status: DISCONTINUED | OUTPATIENT
Start: 2024-04-24 | End: 2024-04-24

## 2024-04-24 RX ORDER — HYDROMORPHONE HYDROCHLORIDE 2 MG/ML
INJECTION, SOLUTION INTRAMUSCULAR; INTRAVENOUS; SUBCUTANEOUS
Status: DISCONTINUED | OUTPATIENT
Start: 2024-04-24 | End: 2024-04-24

## 2024-04-24 RX ORDER — DIPHENHYDRAMINE HYDROCHLORIDE 50 MG/ML
25 INJECTION INTRAMUSCULAR; INTRAVENOUS EVERY 6 HOURS PRN
Status: DISCONTINUED | OUTPATIENT
Start: 2024-04-24 | End: 2024-04-24 | Stop reason: HOSPADM

## 2024-04-24 RX ORDER — PROPOFOL 10 MG/ML
INJECTION, EMULSION INTRAVENOUS
Status: DISCONTINUED | OUTPATIENT
Start: 2024-04-24 | End: 2024-04-24

## 2024-04-24 RX ORDER — LIDOCAINE HYDROCHLORIDE 20 MG/ML
INJECTION, SOLUTION EPIDURAL; INFILTRATION; INTRACAUDAL; PERINEURAL
Status: DISCONTINUED | OUTPATIENT
Start: 2024-04-24 | End: 2024-04-24

## 2024-04-24 RX ORDER — FENTANYL CITRATE 50 UG/ML
INJECTION, SOLUTION INTRAMUSCULAR; INTRAVENOUS
Status: DISCONTINUED | OUTPATIENT
Start: 2024-04-24 | End: 2024-04-24

## 2024-04-24 RX ORDER — MORPHINE SULFATE 10 MG/ML
4 INJECTION INTRAMUSCULAR; INTRAVENOUS; SUBCUTANEOUS EVERY 5 MIN PRN
Status: DISCONTINUED | OUTPATIENT
Start: 2024-04-24 | End: 2024-04-24 | Stop reason: HOSPADM

## 2024-04-24 RX ORDER — HYDROMORPHONE HYDROCHLORIDE 2 MG/ML
0.5 INJECTION, SOLUTION INTRAMUSCULAR; INTRAVENOUS; SUBCUTANEOUS EVERY 5 MIN PRN
Status: DISCONTINUED | OUTPATIENT
Start: 2024-04-24 | End: 2024-04-24 | Stop reason: HOSPADM

## 2024-04-24 RX ADMIN — HEPARIN SODIUM 5000 UNITS: 5000 INJECTION, SOLUTION INTRAVENOUS; SUBCUTANEOUS at 08:04

## 2024-04-24 RX ADMIN — HYDROMORPHONE HYDROCHLORIDE 1 MG: 2 INJECTION, SOLUTION INTRAMUSCULAR; INTRAVENOUS; SUBCUTANEOUS at 07:04

## 2024-04-24 RX ADMIN — FENTANYL CITRATE 25 MCG: 50 INJECTION INTRAMUSCULAR; INTRAVENOUS at 03:04

## 2024-04-24 RX ADMIN — HYDROMORPHONE HYDROCHLORIDE 0.5 MG: 2 INJECTION, SOLUTION INTRAMUSCULAR; INTRAVENOUS; SUBCUTANEOUS at 05:04

## 2024-04-24 RX ADMIN — TRAZODONE HYDROCHLORIDE 50 MG: 50 TABLET ORAL at 08:04

## 2024-04-24 RX ADMIN — METRONIDAZOLE 500 MG: 500 TABLET ORAL at 08:04

## 2024-04-24 RX ADMIN — MUPIROCIN: 20 OINTMENT TOPICAL at 09:04

## 2024-04-24 RX ADMIN — FENTANYL CITRATE 25 MCG: 50 INJECTION INTRAMUSCULAR; INTRAVENOUS at 04:04

## 2024-04-24 RX ADMIN — ACETAMINOPHEN 1000 MG: 500 TABLET ORAL at 09:04

## 2024-04-24 RX ADMIN — LIDOCAINE HYDROCHLORIDE 60 MG: 20 INJECTION, SOLUTION INTRAVENOUS at 03:04

## 2024-04-24 RX ADMIN — SODIUM CHLORIDE: 9 INJECTION, SOLUTION INTRAVENOUS at 03:04

## 2024-04-24 RX ADMIN — FAMOTIDINE 20 MG: 20 TABLET ORAL at 08:04

## 2024-04-24 RX ADMIN — SENNOSIDES AND DOCUSATE SODIUM 2 TABLET: 8.6; 5 TABLET ORAL at 08:04

## 2024-04-24 RX ADMIN — HYDROMORPHONE HYDROCHLORIDE 0.5 MG: 2 INJECTION, SOLUTION INTRAMUSCULAR; INTRAVENOUS; SUBCUTANEOUS at 04:04

## 2024-04-24 RX ADMIN — BUPROPION HYDROCHLORIDE 75 MG: 75 TABLET, FILM COATED ORAL at 08:04

## 2024-04-24 RX ADMIN — FENTANYL CITRATE 50 MCG: 50 INJECTION INTRAMUSCULAR; INTRAVENOUS at 04:04

## 2024-04-24 RX ADMIN — PROPOFOL 120 MG: 10 INJECTION, EMULSION INTRAVENOUS at 03:04

## 2024-04-24 RX ADMIN — OLANZAPINE 5 MG: 5 TABLET, FILM COATED ORAL at 08:04

## 2024-04-24 RX ADMIN — OXYCODONE 15 MG: 5 TABLET ORAL at 08:04

## 2024-04-24 RX ADMIN — HYDROMORPHONE HYDROCHLORIDE 1 MG: 2 INJECTION, SOLUTION INTRAMUSCULAR; INTRAVENOUS; SUBCUTANEOUS at 02:04

## 2024-04-24 RX ADMIN — CARVEDILOL 25 MG: 25 TABLET, FILM COATED ORAL at 08:04

## 2024-04-24 RX ADMIN — ONDANSETRON 4 MG: 2 INJECTION INTRAMUSCULAR; INTRAVENOUS at 04:04

## 2024-04-24 RX ADMIN — ISOSORBIDE MONONITRATE 30 MG: 30 TABLET, EXTENDED RELEASE ORAL at 08:04

## 2024-04-24 NOTE — ANESTHESIA PREPROCEDURE EVALUATION
04/24/2024  Madhav Correa is a 33 y.o., female.      Pre-op Assessment    I have reviewed the Patient Summary Reports.    I have reviewed the NPO Status.   I have reviewed the Medications.     Review of Systems         Anesthesia Plan  Type of Anesthesia, risks & benefits discussed:    Anesthesia Type: Gen Supraglottic Airway  Intra-op Monitoring Plan: Standard ASA Monitors  Post Op Pain Control Plan: IV/PO Opioids PRN  Induction:  IV  Informed Consent: Informed consent signed with the Patient and all parties understand the risks and agree with anesthesia plan.  All questions answered.   ASA Score: 3    Ready For Surgery From Anesthesia Perspective.     .  NPO greater than 8 hours  No anesthetic complications  Allergic to iodine and strawberries    Hct 26  Cr 4.2  Ca 8.2  HCG negative    ESRD . . . Dialyzed yesterday  SLE  HTN (> 2 meds)    Airway exam deferred (COVID precautions); adequate ROM at neck.

## 2024-04-24 NOTE — PROGRESS NOTES
1657 RECEIVED TO RR EASILY AROUSED. HOB ELEVATED. RESP. UNLABORED. O2 VIA NC. DRESSINGS BILATERAL INNER THIGHS, RIGHT HIP, SACRAL AREA D/I. FLOTATION BOOTS TO LOWER EXTREMITIES. IV INFUSING WELL RIGHT FOREARM 20G. CATH. CLAMPED AT PRESENT. BLOOD SUGAR 76. OBSERVING CLOSELY. SEE FLOW SHEET.      1719 C/O PAIN AT OP-SITES. DILAUDID TITRATED FOR RELIEF.    1805 DOZING AT INTERVALS. PAIN LEVEL 5. DRESSINGS D/I TO OP-SITES. TRANSFERRED TO ROOM WITH OUT DISTRESS NOTED.

## 2024-04-24 NOTE — TRANSFER OF CARE
"Anesthesia Transfer of Care Note    Patient: Madhav Correa    Procedure(s) Performed: Procedure(s) (LRB):  DEBRIDEMENT, WOUND, SACRUM (N/A)  DEBRIDEMENT, LOWER EXTREMITY (Bilateral)    Patient location: PACU    Anesthesia Type: general    Transport from OR: Transported from OR on 2-3 L/min O2 by NC with adequate spontaneous ventilation    Post pain: adequate analgesia    Post assessment: no apparent anesthetic complications and tolerated procedure well    Post vital signs: stable    Level of consciousness: alert and responds to stimulation    Nausea/Vomiting: no nausea/vomiting    Complications: none    Transfer of care protocol was followed      Last vitals: Visit Vitals  BP (!) 134/97   Pulse 90   Temp 36.6 °C (97.9 °F) (Oral)   Resp 10   Ht 5' 9" (1.753 m)   Wt 61.9 kg (136 lb 6.4 oz)   LMP 04/10/2024   SpO2 100%   Breastfeeding No   BMI 20.14 kg/m²     "

## 2024-04-24 NOTE — PROGRESS NOTES
Wounds overall look pretty well.  There is a few spots with some necrosis, but nothing as significant as previously.      Patient NPO.  We will take her to debridement of a few spots to remove some necrotic skin and debride down to healthy tissue of sacrum and bilateral lower extremities.      Risks and benefits explained with the patient including risks for infection, bleeding, injury to surrounding structures, hematoma/seroma formation with need for possible evacuation, possible open.  The patient verbalized understanding, agrees and wishes to proceed with surgery.

## 2024-04-24 NOTE — PROGRESS NOTES
Patient denies shortness of breath.  Review of systems musculoskeletal-patient continue with complaints of pain in her wounds    Physical exam general patient in no acute distress , is moderately drowsy    Vitals:    04/24/24 0300 04/24/24 0424 04/24/24 0740 04/24/24 0745   BP:  133/89  135/88   BP Location:       Patient Position:       Pulse:  100  103   Resp:  18 16 20   Temp:  99.6 °F (37.6 °C)  (!) 100.7 °F (38.2 °C)   TempSrc:    Oral   SpO2:  100%  97%   Weight: 61.9 kg (136 lb 6.4 oz)      Height:          Assessment/plan 1.  ESRD-continue HD support  2.  Calciphylaxis-continue sodium thiosulfate  3.  Anemia- continue EPO on dialysis  4.  Pyuria   5. Leg wounds-continue wound care and antibiotics  6.  Sacral wound-continue wound care  7.  Lupus

## 2024-04-24 NOTE — ANESTHESIA POSTPROCEDURE EVALUATION
Anesthesia Post Evaluation    Patient: Madhav Correa    Procedure(s) Performed: Procedure(s) (LRB):  DEBRIDEMENT, WOUND, SACRUM (N/A)  DEBRIDEMENT, LOWER EXTREMITY (Bilateral)    Final Anesthesia Type: general      Patient location during evaluation: PACU  Post-procedure vital signs: reviewed and stable  Pain management: adequate  Airway patency: patent    PONV status at discharge: No PONV  Anesthetic complications: no      Cardiovascular status: hemodynamically stable  Respiratory status: unassisted  Hydration status: euvolemic  Follow-up not needed.              Vitals Value Taken Time   /84 04/24/24 1826   Temp 36.8 °C (98.2 °F) 04/24/24 1826   Pulse 96 04/24/24 1826   Resp 12 04/24/24 1805   SpO2 96 % 04/24/24 1826         Event Time   Out of Recovery 04/24/2024 18:05:00         Pain/Chandler Score: Pain Rating Prior to Med Admin: 6 (4/24/2024  5:49 PM)  Pain Rating Post Med Admin: 5 (4/24/2024  6:00 PM)  Chandler Score: 10 (4/24/2024  6:00 PM)

## 2024-04-24 NOTE — ANESTHESIA PROCEDURE NOTES
Intubation    Date/Time: 4/24/2024 3:56 PM    Performed by: Deo Rodriguez Jr., CRNA  Authorized by: Greg Marte MD    Intubation:     Induction:  Intravenous    Intubated:  Postinduction    Mask Ventilation:  Easy mask    Attempts:  1    Attempted By:  CRNA    Method of Intubation:  Direct    Difficult Airway Encountered?: No      Airway Device:  Intubating LMA    Airway Device Size:  4.0    Style/Cuff Inflation:  Cuffed (inflated to minimal occlusive pressure)    Secured at:  The lips    Placement Verified By:  Capnometry    Complicating Factors:  None    Findings Post-Intubation:  Atraumatic/condition of teeth unchanged

## 2024-04-24 NOTE — PROGRESS NOTES
[facility] - [address] [place] [address] Medicine  Progress Note    Patient Name: Madhav Correa  MRN: [id number]  Patient Class: IP- Inpatient   Admission Date: 4/17/2024  Length of Stay: 6 days  Attending Physician: Sugar Garcia DO  Primary Care Provider: Rehab, [facility]        Subjective:     Principal Problem:Calciphylaxis with nonhealing ulcer of leg        HPI:  32 yo F presents to [facility] ED from [facility] for worsening wounds.  Patient is severely debilitated due to SLE.  She was diagnosed five years ago and has suffered secondary hypertension and  ESRD and is now on HD TTS.  She has also developed severe calciphylaxis during her daily dialysis when she was hospitalized at [facility].  Patient lives in [place] and was transferred for wound care to [place] because they were one of few nursing homes that would accept HD patients.      This patient has a tragic story and I have read the dc summary from 4/424 regarding the eight week course of thiosulfate and the calciphylaxis becoming worse.  She has developed severe wounds on her legs and buttocks (see photos).  She was on meropenum and levaquin in hospital for MSSA and pseudomonas.  She was discharged to the nursing home with wound care and levaquin.  Patient has worsening wounds and the nursing home sent her to [facility] ED for further evaluation and admission.  Prior to transfer from [facility], the discharging physician and the nephrologist and ID physician spoke to her about considering hospice/palliative care.  Patient was adamant about being a full code.  She has two children (14 yo and 12 yo).  She wants to raise her children.      She states that she is able to stand and walk some with assistance.  She does move her upper extremities and feeds herself but has generalized weakness of all extremities but says she is not familiar with the term transverse myelitis and is not sure why her  extremities so weak (3/5 of all four - able to lift gravity).  She has some atrophy noted but most of her leg problems may be from being bedridden with these wounds.  She has been on steroids previously for lupus flares but not chronically.       Overview/Hospital Course:  33 year old female with an extensive PMH presents with calciphylaxis and is ESRD.  Patient denies chest pain, shortness of breath, nausea, vomiting, or diarrhea.      No new subjective & objective note has been filed under this hospital service since the last note was generated.      Assessment/Plan:      * Calciphylaxis with nonhealing ulcer of leg  On Merrem and Flagyl; has Psudomonas and Citrobacter in wound cultures; negative blood cultures; followed by wound care; ID consult pending; On sodium thiosulfate    Acute cystitis without hematuria  On Vancomycin; has Staph epidermidis in urine culture but may be contamination    ESRD (end stage renal disease) on dialysis  On HD    Acute blood loss anemia  S/P transfusion on dialysis; occult blood negative; s/p surgical intervention    Sacral decubitus ulcer  On Merrem; followed by wound care    SLE (systemic lupus erythematosus)  No active disease noted at this time    Anemia in chronic kidney disease  S/p transfusion on dialysis; monitor Hgb        Sugar Garcia DO  Department of Hospital Medicine   [facility] - [address] [place] [address]

## 2024-04-24 NOTE — OP NOTE
[facility] - Periop Services  Surgery Department  Operative Note    SUMMARY     Date of Procedure: 4/24/2024     Procedure: Procedure(s) (LRB):  DEBRIDEMENT, WOUND, SACRUM (N/A)  DEBRIDEMENT, LOWER EXTREMITY (Bilateral)     Surgeons and Role:     * Henok Gage, DO - Primary    Assisting Surgeon: None    Pre-Operative Diagnosis: Wound infection [T14.8XXA, L08.9]  Sacral decubitus ulcer [L89.159]    Post-Operative Diagnosis: Post-Op Diagnosis Codes:     * Wound infection [T14.8XXA, L08.9]     * Sacral decubitus ulcer [L89.159]    Anesthesia: General    Operative Findings (including complications, if any):  Fat necrosis with some purulent drainage extending on all the wounds, but easily wiped off with physical rubbing with sponges and gauze.  Skin necrosis with some fat necrosis of the sacrum;    Description of Technical Procedures:  Patient was taken the operating room and placed on the operating table in the right lateral decubitus position.  Patient underwent general anesthesia per the anesthesia team.  The sacrum and bilateral lower extremities were prepped and draped in usual sterile fashion.  There was some necrotic skin and fat necrosis on the sacrum.  We debrided some necrotic skin off of the wound performing a sharp excisional debridement with electrocautery and the wound measured 10 by 10 by 1 cm.  We mechanically scrubbed the right inner thigh in left lateral thigh and cleaned any necrotic fat and purulent drainage and there was healthy bleeding granulation tissue underneath; the lateral thigh wound measured 13 x 8 x 1 cm the right inner thigh measured 20 x 10 x 0.5 cm.  We then irrigated all areas and suctioned clear, 4 x 4 gauze and ABD pads placed.  The patient was then turned supine and we evaluated the other wounds.  We found there to be fat necrosis and purulent drainage on the wounds, but after mechanical scrubbing with sponges, these were cleaned down to healthy granulation tissue  which was healthy and bleeding.  The right anterior thigh measured 15 x 10 x 0.5 cm, the right hip measured 12 x 9 x 0.5 cm.  We irrigated the wounds and suctioned clear.  4 x 4 gauze and ABD pads placed.  Patient was awakened, extubated and taken back to the PACU in stable condition.  Patient tolerated procedure well.        Estimated Blood Loss (EBL): 25cc           Implants: * No implants in log *    Specimens:   Specimen (24h ago, onward)       Start     Ordered    04/24/24 1622  Surgical Pathology  RELEASE UPON ORDERING         04/24/24 1622                            Condition: Good    Disposition: PACU - hemodynamically stable.    Attestation: Op Note Attestation: I was physically present and scrubbed for the entire procedure.

## 2024-04-24 NOTE — PLAN OF CARE
Problem: Skin Injury Risk Increased  Goal: Skin Health and Integrity  Outcome: Progressing     Problem: Adult Inpatient Plan of Care  Goal: Plan of Care Review  Outcome: Progressing  Goal: Patient-Specific Goal (Individualized)  Outcome: Progressing  Goal: Absence of Hospital-Acquired Illness or Injury  Outcome: Progressing  Goal: Optimal Comfort and Wellbeing  Outcome: Progressing  Goal: Readiness for Transition of Care  Outcome: Progressing     Problem: Infection  Goal: Absence of Infection Signs and Symptoms  Outcome: Progressing     Problem: Adjustment to Illness (Sepsis/Septic Shock)  Goal: Optimal Coping  Outcome: Progressing     Problem: Bleeding (Sepsis/Septic Shock)  Goal: Absence of Bleeding  Outcome: Progressing     Problem: Glycemic Control Impaired (Sepsis/Septic Shock)  Goal: Blood Glucose Level Within Desired Range  Outcome: Progressing     Problem: Infection Progression (Sepsis/Septic Shock)  Goal: Absence of Infection Signs and Symptoms  Outcome: Progressing     Problem: Nutrition Impaired (Sepsis/Septic Shock)  Goal: Optimal Nutrition Intake  Outcome: Progressing     Problem: Device-Related Complication Risk (Hemodialysis)  Goal: Safe, Effective Therapy Delivery  Outcome: Progressing     Problem: Hemodynamic Instability (Hemodialysis)  Goal: Effective Tissue Perfusion  Outcome: Progressing     Problem: Infection (Hemodialysis)  Goal: Absence of Infection Signs and Symptoms  Outcome: Progressing     Problem: Fall Injury Risk  Goal: Absence of Fall and Fall-Related Injury  Outcome: Progressing     Problem: Wound  Goal: Optimal Coping  Outcome: Progressing  Goal: Optimal Functional Ability  Outcome: Progressing  Goal: Absence of Infection Signs and Symptoms  Outcome: Progressing  Goal: Improved Oral Intake  Outcome: Progressing  Goal: Optimal Pain Control and Function  Outcome: Progressing  Goal: Skin Health and Integrity  Outcome: Progressing  Goal: Optimal Wound Healing  Outcome: Progressing

## 2024-04-24 NOTE — PLAN OF CARE
Problem: Skin Injury Risk Increased  Goal: Skin Health and Integrity  4/24/2024 1235 by Irene Minaya RN  Outcome: Progressing  4/24/2024 1233 by Irene Minaya RN  Outcome: Progressing     Problem: Adult Inpatient Plan of Care  Goal: Plan of Care Review  4/24/2024 1235 by Irene Minaya RN  Outcome: Progressing  4/24/2024 1233 by Irene Minaya RN  Outcome: Progressing  Goal: Patient-Specific Goal (Individualized)  4/24/2024 1235 by Irene Minaya RN  Outcome: Progressing  4/24/2024 1233 by Irene Minaya RN  Outcome: Progressing  Goal: Absence of Hospital-Acquired Illness or Injury  4/24/2024 1235 by Irene Minaya RN  Outcome: Progressing  4/24/2024 1233 by Irene Minaya RN  Outcome: Progressing  Goal: Optimal Comfort and Wellbeing  4/24/2024 1235 by Irene Minaya RN  Outcome: Progressing  4/24/2024 1233 by Irene Minaya RN  Outcome: Progressing  Goal: Readiness for Transition of Care  4/24/2024 1235 by Irene Minaya RN  Outcome: Progressing  4/24/2024 1233 by Irene Minaya RN  Outcome: Progressing     Problem: Infection  Goal: Absence of Infection Signs and Symptoms  4/24/2024 1235 by Irene Minaya RN  Outcome: Progressing  4/24/2024 1233 by Irene Minaya RN  Outcome: Progressing     Problem: Adjustment to Illness (Sepsis/Septic Shock)  Goal: Optimal Coping  4/24/2024 1235 by Irene Minaya RN  Outcome: Progressing  4/24/2024 1233 by Irene Minaya RN  Outcome: Progressing     Problem: Bleeding (Sepsis/Septic Shock)  Goal: Absence of Bleeding  4/24/2024 1235 by Irene Minaya RN  Outcome: Progressing  4/24/2024 1233 by Irene Minaya RN  Outcome: Progressing     Problem: Glycemic Control Impaired (Sepsis/Septic Shock)  Goal: Blood Glucose Level Within Desired Range  4/24/2024 1235 by Irene Minaya RN  Outcome: Progressing  4/24/2024 1233 by Irene Minaya RN  Outcome: Progressing     Problem: Infection Progression (Sepsis/Septic Shock)  Goal: Absence of Infection Signs and  Symptoms  4/24/2024 1235 by Irene Minaya RN  Outcome: Progressing  4/24/2024 1233 by Irene Minaya RN  Outcome: Progressing     Problem: Nutrition Impaired (Sepsis/Septic Shock)  Goal: Optimal Nutrition Intake  4/24/2024 1235 by Irene Minaya RN  Outcome: Progressing  4/24/2024 1233 by Irene Minaya RN  Outcome: Progressing     Problem: Device-Related Complication Risk (Hemodialysis)  Goal: Safe, Effective Therapy Delivery  4/24/2024 1235 by Irene Minaya RN  Outcome: Progressing  4/24/2024 1233 by Irene Minaya RN  Outcome: Progressing     Problem: Hemodynamic Instability (Hemodialysis)  Goal: Effective Tissue Perfusion  4/24/2024 1235 by Irene Minaya RN  Outcome: Progressing  4/24/2024 1233 by Irene Minaya RN  Outcome: Progressing     Problem: Infection (Hemodialysis)  Goal: Absence of Infection Signs and Symptoms  4/24/2024 1235 by Irene Minaya RN  Outcome: Progressing  4/24/2024 1233 by Irene Minaya RN  Outcome: Progressing     Problem: Fall Injury Risk  Goal: Absence of Fall and Fall-Related Injury  4/24/2024 1235 by Irene Minaya RN  Outcome: Progressing  4/24/2024 1233 by Irene Minaya RN  Outcome: Progressing     Problem: Wound  Goal: Optimal Coping  4/24/2024 1235 by Irene Minaya RN  Outcome: Progressing  4/24/2024 1233 by Irene Minaya RN  Outcome: Progressing  Goal: Optimal Functional Ability  4/24/2024 1235 by Irene Minaya RN  Outcome: Progressing  4/24/2024 1233 by Irene Minaya RN  Outcome: Progressing  Goal: Absence of Infection Signs and Symptoms  4/24/2024 1235 by Irene Minaya RN  Outcome: Progressing  4/24/2024 1233 by Irene Minaya RN  Outcome: Progressing  Goal: Improved Oral Intake  4/24/2024 1235 by Irene Minaya RN  Outcome: Progressing  4/24/2024 1233 by Irene Minaya RN  Outcome: Progressing  Goal: Optimal Pain Control and Function  4/24/2024 1235 by Vercher, Irene, RN  Outcome: Progressing  4/24/2024 1233 by Irene Minaya RN  Outcome:  Progressing  Goal: Skin Health and Integrity  4/24/2024 1235 by Irene Minaya, RN  Outcome: Progressing  4/24/2024 1233 by Irene Minaya RN  Outcome: Progressing  Goal: Optimal Wound Healing  4/24/2024 1235 by Irene Minaya, RN  Outcome: Progressing  4/24/2024 1233 by Irene Minaya, RN  Outcome: Progressing

## 2024-04-24 NOTE — PROGRESS NOTES
1825 RELEASED TO FLOOR RN AWAKE, ALERT. V/S 130/84-96-16-98.2 TEMP-96% O2 SAT ON ROOM AIR. NO FAMILY PRESENT.

## 2024-04-25 LAB
ALBUMIN SERPL BCP-MCNC: 1.6 G/DL (ref 3.5–5)
ANION GAP SERPL CALCULATED.3IONS-SCNC: 13 MMOL/L (ref 7–16)
BASOPHILS # BLD AUTO: 0.02 K/UL (ref 0–0.2)
BASOPHILS NFR BLD AUTO: 0.3 % (ref 0–1)
BUN SERPL-MCNC: 37 MG/DL (ref 7–18)
BUN/CREAT SERPL: 7 (ref 6–20)
CALCIUM SERPL-MCNC: 8.2 MG/DL (ref 8.5–10.1)
CHLORIDE SERPL-SCNC: 106 MMOL/L (ref 98–107)
CO2 SERPL-SCNC: 25 MMOL/L (ref 21–32)
CREAT SERPL-MCNC: 5.24 MG/DL (ref 0.55–1.02)
DIFFERENTIAL METHOD BLD: ABNORMAL
EGFR (NO RACE VARIABLE) (RUSH/TITUS): 10 ML/MIN/1.73M2
EOSINOPHIL # BLD AUTO: 0.27 K/UL (ref 0–0.5)
EOSINOPHIL NFR BLD AUTO: 4.3 % (ref 1–4)
ERYTHROCYTE [DISTWIDTH] IN BLOOD BY AUTOMATED COUNT: 19.4 % (ref 11.5–14.5)
GLUCOSE SERPL-MCNC: 88 MG/DL (ref 74–106)
HCT VFR BLD AUTO: 26.3 % (ref 38–47)
HGB BLD-MCNC: 8 G/DL (ref 12–16)
IMM GRANULOCYTES # BLD AUTO: 0.06 K/UL (ref 0–0.04)
IMM GRANULOCYTES NFR BLD: 0.9 % (ref 0–0.4)
LYMPHOCYTES # BLD AUTO: 1.89 K/UL (ref 1–4.8)
LYMPHOCYTES NFR BLD AUTO: 29.8 % (ref 27–41)
MCH RBC QN AUTO: 27.8 PG (ref 27–31)
MCHC RBC AUTO-ENTMCNC: 30.4 G/DL (ref 32–36)
MCV RBC AUTO: 91.3 FL (ref 80–96)
MICROORGANISM SPEC CULT: ABNORMAL
MONOCYTES # BLD AUTO: 0.98 K/UL (ref 0–0.8)
MONOCYTES NFR BLD AUTO: 15.4 % (ref 2–6)
MPC BLD CALC-MCNC: 9.4 FL (ref 9.4–12.4)
NEUTROPHILS # BLD AUTO: 3.13 K/UL (ref 1.8–7.7)
NEUTROPHILS NFR BLD AUTO: 49.3 % (ref 53–65)
NRBC # BLD AUTO: 0.02 X10E3/UL
NRBC, AUTO (.00): 0.3 %
PHOSPHATE SERPL-MCNC: 3.2 MG/DL (ref 2.5–4.5)
PLATELET # BLD AUTO: 235 K/UL (ref 150–400)
POTASSIUM SERPL-SCNC: 5.1 MMOL/L (ref 3.5–5.1)
RBC # BLD AUTO: 2.88 M/UL (ref 4.2–5.4)
SODIUM SERPL-SCNC: 139 MMOL/L (ref 136–145)
VANCOMYCIN SERPL-MCNC: 16.5 ΜG/ML (ref 0–20)
WBC # BLD AUTO: 6.35 K/UL (ref 4.5–11)

## 2024-04-25 PROCEDURE — 25000003 PHARM REV CODE 250: Performed by: HOSPITALIST

## 2024-04-25 PROCEDURE — 11000001 HC ACUTE MED/SURG PRIVATE ROOM

## 2024-04-25 PROCEDURE — 90935 HEMODIALYSIS ONE EVALUATION: CPT

## 2024-04-25 PROCEDURE — 25000003 PHARM REV CODE 250: Performed by: FAMILY MEDICINE

## 2024-04-25 PROCEDURE — 80202 ASSAY OF VANCOMYCIN: CPT | Performed by: FAMILY MEDICINE

## 2024-04-25 PROCEDURE — 63600175 PHARM REV CODE 636 W HCPCS: Mod: TB | Performed by: INTERNAL MEDICINE

## 2024-04-25 PROCEDURE — 36415 COLL VENOUS BLD VENIPUNCTURE: CPT | Performed by: STUDENT IN AN ORGANIZED HEALTH CARE EDUCATION/TRAINING PROGRAM

## 2024-04-25 PROCEDURE — 80069 RENAL FUNCTION PANEL: CPT | Performed by: STUDENT IN AN ORGANIZED HEALTH CARE EDUCATION/TRAINING PROGRAM

## 2024-04-25 PROCEDURE — 99232 SBSQ HOSP IP/OBS MODERATE 35: CPT | Mod: ,,, | Performed by: HOSPITALIST

## 2024-04-25 PROCEDURE — 25000003 PHARM REV CODE 250: Performed by: STUDENT IN AN ORGANIZED HEALTH CARE EDUCATION/TRAINING PROGRAM

## 2024-04-25 PROCEDURE — 25000003 PHARM REV CODE 250: Performed by: INTERNAL MEDICINE

## 2024-04-25 PROCEDURE — 63600175 PHARM REV CODE 636 W HCPCS: Performed by: FAMILY MEDICINE

## 2024-04-25 PROCEDURE — 63600175 PHARM REV CODE 636 W HCPCS: Performed by: HOSPITALIST

## 2024-04-25 PROCEDURE — 85025 COMPLETE CBC W/AUTO DIFF WBC: CPT | Performed by: STUDENT IN AN ORGANIZED HEALTH CARE EDUCATION/TRAINING PROGRAM

## 2024-04-25 RX ORDER — L. ACIDOPHILUS/L.BULGARICUS 100MM CELL
1 GRANULES IN PACKET (EA) ORAL 2 TIMES DAILY
Status: DISCONTINUED | OUTPATIENT
Start: 2024-04-25 | End: 2024-04-30 | Stop reason: HOSPADM

## 2024-04-25 RX ADMIN — SILVER SULFADIAZINE: 10 CREAM TOPICAL at 08:04

## 2024-04-25 RX ADMIN — CARVEDILOL 25 MG: 25 TABLET, FILM COATED ORAL at 05:04

## 2024-04-25 RX ADMIN — BUPROPION HYDROCHLORIDE 75 MG: 75 TABLET, FILM COATED ORAL at 09:04

## 2024-04-25 RX ADMIN — OXYCODONE 15 MG: 5 TABLET ORAL at 05:04

## 2024-04-25 RX ADMIN — BUPROPION HYDROCHLORIDE 75 MG: 75 TABLET, FILM COATED ORAL at 08:04

## 2024-04-25 RX ADMIN — OXYCODONE 15 MG: 5 TABLET ORAL at 03:04

## 2024-04-25 RX ADMIN — ISOSORBIDE MONONITRATE 30 MG: 30 TABLET, EXTENDED RELEASE ORAL at 08:04

## 2024-04-25 RX ADMIN — ACETAMINOPHEN 1000 MG: 500 TABLET ORAL at 06:04

## 2024-04-25 RX ADMIN — HEPARIN SODIUM 5000 UNITS: 5000 INJECTION, SOLUTION INTRAVENOUS; SUBCUTANEOUS at 08:04

## 2024-04-25 RX ADMIN — SEVELAMER CARBONATE 1600 MG: 800 TABLET, FILM COATED ORAL at 08:04

## 2024-04-25 RX ADMIN — HYDROMORPHONE HYDROCHLORIDE 1 MG: 2 INJECTION, SOLUTION INTRAMUSCULAR; INTRAVENOUS; SUBCUTANEOUS at 12:04

## 2024-04-25 RX ADMIN — SEVELAMER CARBONATE 1600 MG: 800 TABLET, FILM COATED ORAL at 05:04

## 2024-04-25 RX ADMIN — EPOETIN ALFA 10000 UNITS: 10000 SOLUTION INTRAVENOUS; SUBCUTANEOUS at 12:04

## 2024-04-25 RX ADMIN — SODIUM THIOSULFATE 25 G: 250 INJECTION, SOLUTION INTRAVENOUS at 12:04

## 2024-04-25 RX ADMIN — HYDROMORPHONE HYDROCHLORIDE 1 MG: 2 INJECTION, SOLUTION INTRAMUSCULAR; INTRAVENOUS; SUBCUTANEOUS at 03:04

## 2024-04-25 RX ADMIN — HEPARIN SODIUM 5000 UNITS: 5000 INJECTION, SOLUTION INTRAVENOUS; SUBCUTANEOUS at 09:04

## 2024-04-25 RX ADMIN — SENNOSIDES AND DOCUSATE SODIUM 2 TABLET: 8.6; 5 TABLET ORAL at 09:04

## 2024-04-25 RX ADMIN — SENNOSIDES AND DOCUSATE SODIUM 2 TABLET: 8.6; 5 TABLET ORAL at 08:04

## 2024-04-25 RX ADMIN — METRONIDAZOLE 500 MG: 500 TABLET ORAL at 09:04

## 2024-04-25 RX ADMIN — OLANZAPINE 5 MG: 5 TABLET, FILM COATED ORAL at 09:04

## 2024-04-25 RX ADMIN — CARVEDILOL 25 MG: 25 TABLET, FILM COATED ORAL at 08:04

## 2024-04-25 RX ADMIN — MEROPENEM 500 MG: 500 INJECTION, POWDER, FOR SOLUTION INTRAVENOUS at 03:04

## 2024-04-25 RX ADMIN — HYDROMORPHONE HYDROCHLORIDE 1 MG: 2 INJECTION, SOLUTION INTRAMUSCULAR; INTRAVENOUS; SUBCUTANEOUS at 09:04

## 2024-04-25 RX ADMIN — MUPIROCIN: 20 OINTMENT TOPICAL at 08:04

## 2024-04-25 RX ADMIN — Medication 1 EACH: at 09:04

## 2024-04-25 RX ADMIN — METRONIDAZOLE 500 MG: 500 TABLET ORAL at 08:04

## 2024-04-25 RX ADMIN — FAMOTIDINE 20 MG: 20 TABLET ORAL at 08:04

## 2024-04-25 NOTE — PROGRESS NOTES
[facility] - [address] [place] [address] Medicine  Progress Note    Patient Name: Madhav Correa  MRN: [id number]  Patient Class: IP- Inpatient   Admission Date: 4/17/2024  Length of Stay: 7 days  Attending Physician: Sugar Garcia DO  Primary Care Provider: Rehab, [facility]        Subjective:     Principal Problem:Calciphylaxis with nonhealing ulcer of leg        HPI:  32 yo F presents to [facility] ED from [facility] for worsening wounds.  Patient is severely debilitated due to SLE.  She was diagnosed five years ago and has suffered secondary hypertension and  ESRD and is now on HD TTS.  She has also developed severe calciphylaxis during her daily dialysis when she was hospitalized at [facility].  Patient lives in [place] and was transferred for wound care to [place] because they were one of few nursing homes that would accept HD patients.      This patient has a tragic story and I have read the dc summary from 4/424 regarding the eight week course of thiosulfate and the calciphylaxis becoming worse.  She has developed severe wounds on her legs and buttocks (see photos).  She was on meropenum and levaquin in hospital for MSSA and pseudomonas.  She was discharged to the nursing home with wound care and levaquin.  Patient has worsening wounds and the nursing home sent her to [facility] ED for further evaluation and admission.  Prior to transfer from [facility], the discharging physician and the nephrologist and ID physician spoke to her about considering hospice/palliative care.  Patient was adamant about being a full code.  She has two children (14 yo and 12 yo).  She wants to raise her children.      She states that she is able to stand and walk some with assistance.  She does move her upper extremities and feeds herself but has generalized weakness of all extremities but says she is not familiar with the term transverse myelitis and is not sure why her  extremities so weak (3/5 of all four - able to lift gravity).  She has some atrophy noted but most of her leg problems may be from being bedridden with these wounds.  She has been on steroids previously for lupus flares but not chronically.       Overview/Hospital Course:  33 year old female with an extensive PMH presents with calciphylaxis and is ESRD.  Patient denies chest pain, shortness of breath, nausea, vomiting, or diarrhea.      Vitals:    04/25/24 1130 04/25/24 1200 04/25/24 1230 04/25/24 1300   BP: 131/84 134/88 (!) 144/93 136/81   BP Location: Right arm Right arm Right arm Right arm   Patient Position: Lying Lying Lying Lying   Pulse: 86 86 85 88   Resp: 18 18 18 18   Temp:       TempSrc:       SpO2:       Weight:       Height:             PHYSICAL EXAMINATION:    GEN: NAD; alert and oriented x 3  CVS: regular rate and rhythm  RESP: normal respiratory effort; no audible wheezing noted  GI: non-distended  EXTR: BLE wounds      Assessment/Plan:      * Calciphylaxis with nonhealing ulcer of leg  On Merrem for the Citrobacter; has Psudomonas and Citrobacter in wound cultures; negative blood cultures; followed by wound care; spoke to ID at [facility] who suggested Zerbaxa for the Pseudomonas; will order; On sodium thiosulfate    Acute cystitis without hematuria  On Vancomycin; has Staph epidermidis in urine culture but may be contamination    ESRD (end stage renal disease) on dialysis  On HD    Acute blood loss anemia  S/P transfusion on dialysis; occult blood negative; s/p surgical intervention; Hgb ok    Sacral decubitus ulcer  On Merrem and Zerbaxa; followed by wound care; s/p debridement    SLE (systemic lupus erythematosus)  No active disease noted at this time    Anemia in chronic kidney disease  S/p transfusion on dialysis; monitor Hgb; Hgb ok        Sugar Garcia DO  Department of Hospital Medicine   [facility] [address] [facility] Tele[facility]

## 2024-04-25 NOTE — PROGRESS NOTES
Wounds cleaned in the operating room; no significant necrosis.  Mostly just fibrinous exudate/purulent drainage; this was wiped off with gauze and had a healthy granular bed of tissue underneath.      Dressing changes need to be done daily to remove moisture and fluid.     No need for further debridement at this time; general surgery will sign off

## 2024-04-25 NOTE — PROGRESS NOTES
Vancomycin random resulted as 16.5, patient to get HD today. Will schedule 1250 mg vancomycin IV x 1 for 1800 after HD. Noted patient weight change to 61.9 kg. Will order random level for 4/30 at 0600. Pharmacy to follow and make necessary adjustments.    Cinthia Roach, PharmD  Ext. [phone]

## 2024-04-25 NOTE — PLAN OF CARE
Problem: Skin Injury Risk Increased  Goal: Skin Health and Integrity  Outcome: Progressing  Intervention: Optimize Skin Protection  Flowsheets (Taken 4/24/2024 1934)  Pressure Reduction Techniques:   frequent weight shift encouraged   weight shift assistance provided  Pressure Reduction Devices:   pressure-redistributing mattress utilized   specialty bed utilized  Skin Protection: incontinence pads utilized  Activity Management:   Arm raise - L1   Ankle pumps - L1  Head of Bed (HOB) Positioning: HOB at 20-30 degrees  Intervention: Promote and Optimize Oral Intake  Flowsheets (Taken 4/24/2024 1934)  Oral Nutrition Promotion: social interaction promoted  Nutrition Interventions: supplemental foods provided     Problem: Infection  Goal: Absence of Infection Signs and Symptoms  Outcome: Progressing  Intervention: Prevent or Manage Infection  Flowsheets (Taken 4/24/2024 1934)  Fever Reduction/Comfort Measures: lightweight bedding  Infection Management: aseptic technique maintained  Isolation Precautions: precautions maintained

## 2024-04-25 NOTE — PLAN OF CARE
Chart review. Pt had successful wound debridement 4/24. Pt is on day 9/15 of her bed hold from [facility]. SS following for additional dc needs.

## 2024-04-25 NOTE — CONSULTS
[facility] - Periop Services  Adult Nutrition  Consult Note         Reason for Assessment  Reason For Assessment: RD follow-up for protein malnutrition  Nutrition Risk Screen: large or nonhealing wound, burn or pressure injury    Assessment and Plan  4/25/2024 RD Follow up: Patient advanced to a renal high protein diet with Keegan BID + Boost Plus all meals. Patient currently out of room with visits attempted by RD x 3 but unable to visit with patient. Po intake is good per flowsheets with % documented per flowsheets.     Current weight is 136 pounds. Weight fluctuations noted frequently per chart review and expected due to fluid changes/ESRD on HD. Recommend to continue diet and supplements as tolerated. Diet appropriate to meet nutritional needs. RD Following. Will attempt RD visit on follow up.     Consult received and appreciated. Patient admitted 4/17 with a dx of SLE, sacral decubitus ulcer s/p debridement, calciphylaxis with nonhealing ulcer of leg, ESRD on HD, and sepsis.     Patient is currently NPO s/p debridement. Patient is 169 pounds with a BMI of 24.96. Chart review shows historical weight of 222 pounds in Jan 2024. Noted dx anasarca in February 2024. Fluid and ESRD likely contributing to weight changes.      Patient currently off floor for procedure. Will complete patient interview and NFPE on follow up. Malnutrition suspected due to chronic illness (SLE, ESRD on HD) and multiple wounds.    Recommend to advance diet as medically appropriate to Renal High protein. Recommend to add Novasource Renal all meals and Keegan BID when diet advanced. RD Following.             Learning Needs/Social Determinants of Health  Learning Assessment       04/18/2024 0603 [facility] (4/17/2024 - Present)   Created by Cathy Mcintosh, RN - RN (Nurse) Status: Complete                 PRIMARY LEARNER     Primary Learner Name:  Madhav Sunny ARMSTRONG - 04/18/2024 0603    Relationship:   Patient JF - 04/18/2024 0603    Does the primary learner have any barriers to learning?:  No Barriers JF - 04/18/2024 0603    What is the preferred language of the primary learner?:  English JF - 04/18/2024 0603    Is an  required?:  No  - 04/18/2024 0603    How does the primary learner prefer to learn new concepts?:  Listening  - 04/18/2024 0603    How often do you need to have someone help you read instructions, pamphlets, or written material from your doctor or pharmacy?:  Sometimes  - 04/18/2024 0603        CO-LEARNER #1     No question answered        CO-LEARNER #2     No question answered        SPECIAL TOPICS     No question answered        ANSWERED BY:     No question answered        Comments         Edit History       Cathy Mcintosh, RN - RN (Nurse)   04/18/2024 0603                          Social Determinants of Health     Tobacco Use: Medium Risk (4/18/2024)    Patient History     Smoking Tobacco Use: Former     Smokeless Tobacco Use: Never     Passive Exposure: Not on file   Alcohol Use: Not At Risk (4/18/2024)    AUDIT-C     Frequency of Alcohol Consumption: Never     Average Number of Drinks: Patient does not drink     Frequency of Binge Drinking: Never   Financial Resource Strain: Low Risk  (4/18/2024)    Overall Financial Resource Strain (CARDIA)     Difficulty of Paying Living Expenses: Not hard at all   Food Insecurity: No Food Insecurity (4/18/2024)    Hunger Vital Sign     Worried About Running Out of Food in the Last Year: Never true     Ran Out of Food in the Last Year: Never true   Transportation Needs: No Transportation Needs (4/18/2024)    PRAPARE - Transportation     Lack of Transportation (Medical): No     Lack of Transportation (Non-Medical): No   Physical Activity: Inactive (4/18/2024)    Exercise Vital Sign     Days of Exercise per Week: 0 days     Minutes of Exercise per Session: 0 min   Stress: No Stress Concern Present (4/18/2024)    [place] of  Occupational Health - Occupational Stress Questionnaire     Feeling of Stress : Only a little   Recent Concern: Stress - Stress Concern Present (2/4/2024)    Received from [facility]    [place] of Occupational Health - Occupational Stress Questionnaire     Feeling of Stress : Rather much   Social Connections: Moderately Isolated (4/18/2024)    Social Connection and Isolation Panel [NHANES]     Frequency of Communication with Friends and Family: More than three times a week     Frequency of Social Gatherings with Friends and Family: More than three times a week     Attends [employer] Services: More than 4 times per year     Active Member of Clubs or Organizations: No     Attends Club or Organization Meetings: Never     Marital Status: Never    Housing Stability: Low Risk  (4/18/2024)    Housing Stability Vital Sign     Unable to Pay for Housing in the Last Year: No     Number of Times Moved in the Last Year: 0     Homeless in the Last Year: No   Depression: Moderate depression (2/4/2024)    Received from [facility]    PHQ-9     Patient Health Questionnaire-9 Score: 10            Malnutrition  NFPE will be completed on follow up; patient unavailable at time of initial assessment    Nutrition Diagnosis  Increased Protein Needs related to Wound healing as evidenced by multiple wounds to lower extremities and sacral area  Comments: recommend novasource renal all meals/Keegan BID when diet advanced    Recent Labs   Lab 04/24/24  1658 04/25/24  0438   GLU  --  88   POCGLU 76  --      Comments on Glucose: WNL     Nutrition Prescription / Recommendations  Recommendation/Intervention: recommend to advance diet as medically appropriate; add Keegan BID  Goals: diet advancement with po intake 75%; weight maintenance during admission  Nutrition Goal Status: progressing towards goal  Current Diet Order: Renal on HD  Oral Nutrition Supplement:  Boost Max protein + Keegan BID  Chewing or Swallowing Difficulty?: no issues noted per flowsheets  Recommended Diet: Regular  Recommended Oral Supplement: Keegan [90 kcals, 2.5g Protein, 10g Carbs(3g Sugar), 7g L-Arginine, 7g L-Glutamine, Vitamin C 300mg, 9.5mg Zinc] 2 times a day  Is Nutrition Support Recommended: [facility] Support: No  Is Nutrition Education Recommended: No    Monitor and Evaluation  % current Intake: P.O. intake of 75 - 100 %  % intake to meet estimated needs: 75 - 100 %  Food and Nutrient Intake: energy intake, food and beverage intake  Food and Nutrient Adminstration: diet order  Anthropometric Measurements: height/length, weight, weight change, body mass index  Biochemical Data, Medical Tests and Procedures: electrolyte and renal panel, lipid profile, gastrointestinal profile, glucose/endocrine profile, inflammatory profile  Energy Calories Required: meeting needs  Protein Required: meeting needs  Fluid Required: meeting needs  Tolerance: tolerating    Current Medical Diagnosis and Past Medical History     Past Medical History:   Diagnosis Date    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     Anemia in chronic kidney disease     Asthma     Atypical anxiety disorder     Calciphylaxis     severe failed 8 weeks of thiosulfate tx in [place]   see dc summary    Encounter for blood transfusion     multiple    ESRD (end stage renal disease) on dialysis     HD   TTS    GERD (gastroesophageal reflux disease)     Kwashiorkor     Secondary hypertension     SLE (systemic lupus erythematosus)        Nutrition/Diet History  Spiritual, Cultural Beliefs, Sabianism Practices, Values that Affect Care: no  Food Allergies: NKFA  Factors Affecting Nutritional Intake: None identified at this time    Lab/Procedures/Meds  Recent Labs   Lab 04/25/24  0438      K 5.1   BUN 37*   CREATININE 5.24*   CALCIUM 8.2*   ALBUMIN 1.6*      PHOS 3.2   BUN/Cr elevated; Hx CKD on HD  Alb low; sacral wound with sepsis  "dx  Last A1c: No results found for: "HGBA1C"  Lab Results   Component Value Date    RBC 2.88 (L) 04/25/2024    HGB 8.0 (L) 04/25/2024    HCT 26.3 (L) 04/25/2024    MCV 91.3 04/25/2024    MCH 27.8 04/25/2024    MCHC 30.4 (L) 04/25/2024     Pertinent Labs Reviewed: reviewed  Pertinent Medications Reviewed: reviewed  Scheduled Meds:  Current Facility-Administered Medications   Medication Dose Route Frequency    buPROPion  75 mg Oral BID    carvediloL  25 mg Oral BID WM    [START ON 4/26/2024] ceftolozane-tazobactam  2,500 mg Intravenous Once    [START ON 4/26/2024] ceftolozane-tazobactam  450 mg Intravenous Q8H    epoetin pawel (PROCRIT) injection  10,000 Units Intravenous Every Tues, Thurs, Sat    famotidine  20 mg Oral Daily    heparin (porcine)  5,000 Units Subcutaneous Q12H    isosorbide mononitrate  30 mg Oral Daily    meropenem IV (PEDS and ADULTS)  500 mg Intravenous Q24H    metroNIDAZOLE  500 mg Oral Q12H    mupirocin   Topical (Top) Daily    OLANZapine  5 mg Oral QHS    senna-docusate 8.6-50 mg  2 tablet Oral BID    sevelamer carbonate  1,600 mg Oral TIDWM    silver sulfADIAZINE 1%   Topical (Top) Daily    sodium thiosulfate 25 g in sodium chloride 0.9% 250 mL IVPB  25 g Intravenous Every Tues, Thurs, Sat     Continuous Infusions:  Current Facility-Administered Medications   Medication Dose Route Frequency Last Rate Last Admin     PRN Meds:.  Current Facility-Administered Medications:     0.9%  NaCl infusion (for blood administration), , Intravenous, Q24H PRN    0.9%  NaCl infusion (for blood administration), , Intravenous, Q24H PRN    sodium chloride 0.9%, , Intravenous, PRN    acetaminophen, 1,000 mg, Oral, Q6H PRN    bisacodyL, 10 mg, Oral, Daily PRN    bisacodyL, 10 mg, Rectal, Daily PRN    dextromethorphan-guaiFENesin  mg/5 ml, 10 mL, Oral, Q6H PRN    HYDROmorphone, 1 mg, Intravenous, Q6H PRN    melatonin, 6 mg, Oral, Nightly PRN    ondansetron, 8 mg, Intravenous, Q6H PRN    oxyCODONE, 15 mg, Oral, " "Q6H PRN    simethicone, 1 tablet, Oral, TID PRN    traZODone, 50 mg, Oral, Nightly PRN    vancomycin - pharmacy to dose, , Intravenous, pharmacy to manage frequency    Anthropometrics  Temp: (!) 101.7 °F (38.7 °C)  Height Method: Stated  Height: 5' 9" (175.3 cm)  Height (inches): 69 in  Weight Method: Bed Scale  Weight: 61.9 kg (136 lb 6.4 oz)  Weight (lb): 136.4 lb  Ideal Body Weight (IBW), Female: 145 lb  % Ideal Body Weight, Female (lb): 116.55 %  BMI (Calculated): 20.1       Estimated/Assessed Needs      Temp: (!) 101.7 °F (38.7 °C)Axillary  Weight Used For Calorie Calculations: 76.7 kg (169 lb 1.5 oz)   Energy Need Method: Kcal/kg    Weight Used For Protein Calculations: 76.7 kg (169 lb 1.5 oz)  Protein Requirements:   Estimated Fluid Requirement Method: RDA Method            Nutrition by Nursing  Diet/Nutrition Received: NPO  Intake (%): 100%  Diet/Feeding Assistance: none  Diet/Feeding Tolerance: good  Last Bowel Movement: 04/23/24                Nutrition Follow-Up  RD Follow-up?: Yes      Nutrition Discharge Planning: Patient is NH resident and will return; recommend liberalized diet with Keegan BID            Available via Secure Chat  "

## 2024-04-25 NOTE — PROGRESS NOTES
Patient denies shortness of breath.  Review of systems musculoskeletal-patient states her pain is unchanged, GI-she denies nausea or vomiting    Physical exam general patient in no acute distress     Vitals:    04/25/24 0205 04/25/24 0312 04/25/24 0632 04/25/24 0648   BP: 116/76  119/76    Pulse: 107  104    Resp: 16 18 20    Temp: 98.8 °F (37.1 °C)  (!) 101.7 °F (38.7 °C) (!) 101.7 °F (38.7 °C)   TempSrc: Oral  Axillary    SpO2: 96%  (!) 93%    Weight:       Height:          Assessment/plan 1.  ESRD-continue HD support  2.  Calciphylaxis-continue sodium thiosulfate  3.  Anemia- continue EPO on dialysis, hematocrit is 26%  4.  Pyuria   5. Leg wounds-continue wound care and antibiotics  6.  Sacral wound-continue wound care  7.  Lupus

## 2024-04-26 PROBLEM — R50.9 FEVER: Status: ACTIVE | Noted: 2024-04-26

## 2024-04-26 PROBLEM — R00.0 TACHYCARDIA: Status: ACTIVE | Noted: 2024-04-26

## 2024-04-26 LAB
ALBUMIN SERPL BCP-MCNC: 1.6 G/DL (ref 3.5–5)
ANION GAP SERPL CALCULATED.3IONS-SCNC: 9 MMOL/L (ref 7–16)
ANISOCYTOSIS BLD QL SMEAR: ABNORMAL
BASOPHILS # BLD AUTO: 0.03 K/UL (ref 0–0.2)
BASOPHILS NFR BLD AUTO: 0.3 % (ref 0–1)
BUN SERPL-MCNC: 27 MG/DL (ref 7–18)
BUN/CREAT SERPL: 7 (ref 6–20)
CALCIUM SERPL-MCNC: 8.2 MG/DL (ref 8.5–10.1)
CHLORIDE SERPL-SCNC: 103 MMOL/L (ref 98–107)
CO2 SERPL-SCNC: 27 MMOL/L (ref 21–32)
CREAT SERPL-MCNC: 3.87 MG/DL (ref 0.55–1.02)
CRENATED CELLS: ABNORMAL
DIFFERENTIAL METHOD BLD: ABNORMAL
EGFR (NO RACE VARIABLE) (RUSH/TITUS): 15 ML/MIN/1.73M2
EOSINOPHIL # BLD AUTO: 0.23 K/UL (ref 0–0.5)
EOSINOPHIL NFR BLD AUTO: 2.5 % (ref 1–4)
EOSINOPHIL NFR BLD MANUAL: 1 % (ref 1–4)
ERYTHROCYTE [DISTWIDTH] IN BLOOD BY AUTOMATED COUNT: 19.7 % (ref 11.5–14.5)
ESTROGEN SERPL-MCNC: NORMAL PG/ML
GLUCOSE SERPL-MCNC: 95 MG/DL (ref 74–106)
HCT VFR BLD AUTO: 25.3 % (ref 38–47)
HGB BLD-MCNC: 7.7 G/DL (ref 12–16)
IMM GRANULOCYTES # BLD AUTO: 0.08 K/UL (ref 0–0.04)
IMM GRANULOCYTES NFR BLD: 0.9 % (ref 0–0.4)
INSULIN SERPL-ACNC: NORMAL U[IU]/ML
LAB AP GROSS DESCRIPTION: NORMAL
LAB AP LABORATORY NOTES: NORMAL
LYMPHOCYTES # BLD AUTO: 2.5 K/UL (ref 1–4.8)
LYMPHOCYTES NFR BLD AUTO: 27.3 % (ref 27–41)
LYMPHOCYTES NFR BLD MANUAL: 20 % (ref 27–41)
MCH RBC QN AUTO: 27.5 PG (ref 27–31)
MCHC RBC AUTO-ENTMCNC: 30.4 G/DL (ref 32–36)
MCV RBC AUTO: 90.4 FL (ref 80–96)
MONOCYTES # BLD AUTO: 1.4 K/UL (ref 0–0.8)
MONOCYTES NFR BLD AUTO: 15.3 % (ref 2–6)
MONOCYTES NFR BLD MANUAL: 7 % (ref 2–6)
MPC BLD CALC-MCNC: 9.6 FL (ref 9.4–12.4)
NEUTROPHILS # BLD AUTO: 4.93 K/UL (ref 1.8–7.7)
NEUTROPHILS NFR BLD AUTO: 53.7 % (ref 53–65)
NEUTS BAND NFR BLD MANUAL: 8 % (ref 1–5)
NEUTS SEG NFR BLD MANUAL: 64 % (ref 50–62)
NRBC # BLD AUTO: 0 X10E3/UL
NRBC, AUTO (.00): 0 %
OVALOCYTES BLD QL SMEAR: ABNORMAL
PHOSPHATE SERPL-MCNC: 2.1 MG/DL (ref 2.5–4.5)
PLATELET # BLD AUTO: 241 K/UL (ref 150–400)
PLATELET MORPHOLOGY: ABNORMAL
POTASSIUM SERPL-SCNC: 4.4 MMOL/L (ref 3.5–5.1)
RBC # BLD AUTO: 2.8 M/UL (ref 4.2–5.4)
SCHISTOCYTES BLD QL AUTO: ABNORMAL
SODIUM SERPL-SCNC: 135 MMOL/L (ref 136–145)
T3RU NFR SERPL: NORMAL %
TARGETS BLD QL SMEAR: ABNORMAL
WBC # BLD AUTO: 9.17 K/UL (ref 4.5–11)

## 2024-04-26 PROCEDURE — 25000003 PHARM REV CODE 250: Performed by: HOSPITALIST

## 2024-04-26 PROCEDURE — 99232 SBSQ HOSP IP/OBS MODERATE 35: CPT | Mod: ,,, | Performed by: HOSPITALIST

## 2024-04-26 PROCEDURE — 85025 COMPLETE CBC W/AUTO DIFF WBC: CPT | Performed by: STUDENT IN AN ORGANIZED HEALTH CARE EDUCATION/TRAINING PROGRAM

## 2024-04-26 PROCEDURE — 63600175 PHARM REV CODE 636 W HCPCS: Mod: JZ,JG | Performed by: HOSPITALIST

## 2024-04-26 PROCEDURE — 25000003 PHARM REV CODE 250: Performed by: FAMILY MEDICINE

## 2024-04-26 PROCEDURE — 80069 RENAL FUNCTION PANEL: CPT | Performed by: STUDENT IN AN ORGANIZED HEALTH CARE EDUCATION/TRAINING PROGRAM

## 2024-04-26 PROCEDURE — 63600175 PHARM REV CODE 636 W HCPCS: Performed by: FAMILY MEDICINE

## 2024-04-26 PROCEDURE — 63600175 PHARM REV CODE 636 W HCPCS: Performed by: HOSPITALIST

## 2024-04-26 PROCEDURE — 36415 COLL VENOUS BLD VENIPUNCTURE: CPT | Performed by: STUDENT IN AN ORGANIZED HEALTH CARE EDUCATION/TRAINING PROGRAM

## 2024-04-26 PROCEDURE — 11000001 HC ACUTE MED/SURG PRIVATE ROOM

## 2024-04-26 RX ADMIN — ISOSORBIDE MONONITRATE 30 MG: 30 TABLET, EXTENDED RELEASE ORAL at 08:04

## 2024-04-26 RX ADMIN — OXYCODONE 15 MG: 5 TABLET ORAL at 10:04

## 2024-04-26 RX ADMIN — OXYCODONE 15 MG: 5 TABLET ORAL at 04:04

## 2024-04-26 RX ADMIN — SEVELAMER CARBONATE 1600 MG: 800 TABLET, FILM COATED ORAL at 08:04

## 2024-04-26 RX ADMIN — MEROPENEM 500 MG: 500 INJECTION, POWDER, FOR SOLUTION INTRAVENOUS at 04:04

## 2024-04-26 RX ADMIN — HEPARIN SODIUM 5000 UNITS: 5000 INJECTION, SOLUTION INTRAVENOUS; SUBCUTANEOUS at 08:04

## 2024-04-26 RX ADMIN — CARVEDILOL 25 MG: 25 TABLET, FILM COATED ORAL at 04:04

## 2024-04-26 RX ADMIN — OLANZAPINE 5 MG: 5 TABLET, FILM COATED ORAL at 09:04

## 2024-04-26 RX ADMIN — CEFTOLOZANE AND TAZOBACTAM 450 MG: 1; .5 INJECTION, POWDER, LYOPHILIZED, FOR SOLUTION INTRAVENOUS at 08:04

## 2024-04-26 RX ADMIN — OXYCODONE 15 MG: 5 TABLET ORAL at 08:04

## 2024-04-26 RX ADMIN — SEVELAMER CARBONATE 1600 MG: 800 TABLET, FILM COATED ORAL at 11:04

## 2024-04-26 RX ADMIN — SEVELAMER CARBONATE 1600 MG: 800 TABLET, FILM COATED ORAL at 04:04

## 2024-04-26 RX ADMIN — BUPROPION HYDROCHLORIDE 75 MG: 75 TABLET, FILM COATED ORAL at 08:04

## 2024-04-26 RX ADMIN — Medication 1 EACH: at 09:04

## 2024-04-26 RX ADMIN — CEFTOLOZANE AND TAZOBACTAM 2250 MG: 1; .5 INJECTION, POWDER, LYOPHILIZED, FOR SOLUTION INTRAVENOUS at 11:04

## 2024-04-26 RX ADMIN — SILVER SULFADIAZINE: 10 CREAM TOPICAL at 08:04

## 2024-04-26 RX ADMIN — HYDROMORPHONE HYDROCHLORIDE 1 MG: 2 INJECTION, SOLUTION INTRAMUSCULAR; INTRAVENOUS; SUBCUTANEOUS at 11:04

## 2024-04-26 RX ADMIN — OXYCODONE 15 MG: 5 TABLET ORAL at 01:04

## 2024-04-26 RX ADMIN — MUPIROCIN: 20 OINTMENT TOPICAL at 08:04

## 2024-04-26 RX ADMIN — BUPROPION HYDROCHLORIDE 75 MG: 75 TABLET, FILM COATED ORAL at 09:04

## 2024-04-26 RX ADMIN — HEPARIN SODIUM 5000 UNITS: 5000 INJECTION, SOLUTION INTRAVENOUS; SUBCUTANEOUS at 09:04

## 2024-04-26 RX ADMIN — HYDROMORPHONE HYDROCHLORIDE 1 MG: 2 INJECTION, SOLUTION INTRAMUSCULAR; INTRAVENOUS; SUBCUTANEOUS at 03:04

## 2024-04-26 RX ADMIN — ACETAMINOPHEN 1000 MG: 500 TABLET ORAL at 05:04

## 2024-04-26 RX ADMIN — CARVEDILOL 25 MG: 25 TABLET, FILM COATED ORAL at 08:04

## 2024-04-26 RX ADMIN — SENNOSIDES AND DOCUSATE SODIUM 2 TABLET: 8.6; 5 TABLET ORAL at 09:04

## 2024-04-26 RX ADMIN — Medication 1 EACH: at 08:04

## 2024-04-26 RX ADMIN — SENNOSIDES AND DOCUSATE SODIUM 2 TABLET: 8.6; 5 TABLET ORAL at 08:04

## 2024-04-26 RX ADMIN — FAMOTIDINE 20 MG: 20 TABLET ORAL at 08:04

## 2024-04-26 RX ADMIN — HYDROMORPHONE HYDROCHLORIDE 1 MG: 2 INJECTION, SOLUTION INTRAMUSCULAR; INTRAVENOUS; SUBCUTANEOUS at 06:04

## 2024-04-26 NOTE — PROGRESS NOTES
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     Anemia in chronic kidney disease     Asthma     Atypical anxiety disorder     Calciphylaxis     severe failed 8 weeks of thiosulfate tx in [place]   see dc summary    Encounter for blood transfusion     multiple    ESRD (end stage renal disease) on dialysis     HD   TTS    GERD (gastroesophageal reflux disease)     Kwashiorkor     Secondary hypertension     SLE (systemic lupus erythematosus)        Social History     Socioeconomic History    Marital status: Unknown   Tobacco Use    Smoking status: Former     Current packs/day: 1.00     Average packs/day: 1 pack/day for 4.0 years (4.0 ttl pk-yrs)     Types: Cigarettes     Start date: 5/6/2020     Quit date: 5/6/2015    Smokeless tobacco: Never   Substance and Sexual Activity    Alcohol use: Never    Drug use: Never    Sexual activity: Not Currently     Social Determinants of Health     Financial Resource Strain: Low Risk  (4/18/2024)    Overall Financial Resource Strain (CARDIA)     Difficulty of Paying Living Expenses: Not hard at all   Food Insecurity: No Food Insecurity (4/18/2024)    Hunger Vital Sign     Worried About Running Out of Food in the Last Year: Never true     Ran Out of Food in the Last Year: Never true   Transportation Needs: No Transportation Needs (4/18/2024)    PRAPARE - Transportation     Lack of Transportation (Medical): No     Lack of Transportation (Non-Medical): No   Physical Activity: Inactive (4/18/2024)    Exercise Vital Sign     Days of Exercise per Week: 0 days     Minutes of Exercise per Session: 0 min   Stress: No Stress Concern Present (4/18/2024)    [place] of Occupational Health - Occupational Stress Questionnaire     Feeling of Stress : Only a  little   Recent Concern: Stress - Stress Concern Present (2/4/2024)    Received from [facility] and [facility] [place] of Occupational Health - Occupational Stress Questionnaire     Feeling of Stress : Rather much   Social Connections: Moderately Isolated (4/18/2024)    Social Connection and Isolation Panel [NHANES]     Frequency of Communication with Friends and Family: More than three times a week     Frequency of Social Gatherings with Friends and Family: More than three times a week     Attends Roman Catholic Services: More than 4 times per year     Active Member of Clubs or Organizations: No     Attends Club or Organization Meetings: Never     Marital Status: Never    Housing Stability: Low Risk  (4/18/2024)    Housing Stability Vital Sign     Unable to Pay for Housing in the Last Year: No     Number of Times Moved in the Last Year: 0     Homeless in the Last Year: No       Precautions:     Allergies as of 04/17/2024 - Reviewed 04/17/2024   Allergen Reaction Noted    Iodine Swelling 10/20/2022       WOC Assessment Details/Treatment      04/26/24 0935   WOCN Assessment   WOCN Total Time (mins) 105   Visit Date 04/26/24   Visit Time 0915   Consult Type Follow Up   WOCN Speciality Wound   Wound pressure;At risk for pressure Injury  (Ulcerations)   Number of Wounds 5   Continence Type Urinary   Intervention chart review;assessed;applied;coordination of care   Skin Interventions   Device Skin Pressure Protection adhesive use limited;absorbent pad utilized/changed;pressure points protected;positioning supports utilized   Pressure Reduction Devices positioning supports utilized;pressure-redistributing mattress utilized   Pressure Reduction Techniques frequent weight shift encouraged;positioned off wounds;heels elevated off bed;pressure points protected;weight shift assistance provided   Skin Protection incontinence pads utilized   Positioning   Body Position turned;left   Head of  Bed (HOB) Positioning HOB at 20-30 degrees   Positioning/Transfer Devices pillows;in use   Pressure Injury Prevention    Check Moisture Management Pad Done   Heel protection technique Foam dressing        Wound 04/18/24 1020 Incision Sacral spine   Date First Assessed/Time First Assessed: 04/18/24 1020   Present on Original Admission: Yes  Primary Wound Type: Incision  Location: Sacral spine  Additional Comments: debridement, no closure   Wound Image   (6 x 11 x 3)   Dressing Appearance Intact;Saturated   Drainage Amount Moderate   Drainage Characteristics/Odor Serosanguineous;Creamy;Yellow   Appearance Pink;Tan;White;Wet   Periwound Area Dry;Scar tissue   Wound Edges Open;Irregular;Defined   Care Cleansed with:;Antimicrobial agent;Wound cleanser   Dressing Applied;Silver;Hydrofiber;Absorptive Pad        Wound 04/18/24 1024 Incision Right posterior Thigh   Date First Assessed/Time First Assessed: 04/18/24 1024   Present on Original Admission: Yes  Primary Wound Type: Incision  Side: Right  Orientation: posterior  Location: Thigh  Additional Comments: debridement, no closure   Wound Image    Dressing Appearance Intact;Dried drainage   Drainage Amount Small   Drainage Characteristics/Odor Tan;Serosanguineous   Appearance Red;Pink;Tan;Moist;Dry   Periwound Area Dry;Scar tissue   Wound Edges Callused;Open   Care Cleansed with:;Antimicrobial agent;Wound cleanser;Applied:  (silverdene)   Dressing Applied;Absorptive Pad        Wound 04/18/24 1029 Incision Left anterior;lateral Thigh   Date First Assessed/Time First Assessed: 04/18/24 1029   Present on Original Admission: Yes  Primary Wound Type: Incision  Side: Left  Orientation: anterior;lateral  Location: Thigh  Additional Comments: debridement, no closure   Wound Image   (14 x 7 x 1.3 Undermining 1.5 6 oclock to 9)   Dressing Appearance Intact;Dried drainage   Drainage Amount Moderate   Drainage Characteristics/Odor Brown;Serosanguineous   Appearance Pink;Moist  "  Periwound Area Dry   Wound Edges Undefined;Irregular   Care Cleansed with:;Antimicrobial agent;Wound cleanser   Dressing Applied;Hydrofiber        Wound 04/18/24 1050 Incision Right anterior Thigh   Date First Assessed/Time First Assessed: 04/18/24 1050   Present on Original Admission: Yes  Primary Wound Type: Incision  Side: Right  Orientation: anterior  Location: Thigh  Additional Comments: debridement, no closure   Wound Image     (25.5 x 21 x 2.5)   Dressing Appearance Intact;Dried drainage   Drainage Amount Moderate   Drainage Characteristics/Odor Brown;Serosanguineous;Tan   Appearance Red;Tan   Periwound Area Dry   Wound Edges Defined;Irregular;Open   Care Cleansed with:;Antimicrobial agent;Wound cleanser   Dressing Applied;Hydrofiber;Absorptive Pad        Wound 04/20/24 0509 Left anterior Thigh   Date First Assessed/Time First Assessed: 04/20/24 0509   Present on Original Admission: No  Side: Left  Orientation: anterior  Location: Thigh   Wound Image   (22 x 10 x 0.5)     WOC Team consulted for Altered Skin Integrity BLE and sacrum      Narrative: Pt alert and oriented. Pt resting. Pt tolerated wound care with little pain. Per Defatta note "Dressing changes need to be done daily to remove moisture and fluid"   Notified OP Wound Care JORGE Eagle, discontinue silver sulfadiazine cream per Fco. Continue Hydrofiber.    Active Wounds and Recommendations: Continue POC    Goals for Wound Healing: Manage drainage, Moisture management, Apply antimicrobial, Reduce pain, Reduce bioburden, and Educate on proper wound management post D/C     Barriers to Wound Healing: multiple co-morbidities poor vascular supply heavy drainage excessive wound moisture and macerated tissue large surface area large volume    Orders placed.    Thank you for the consult.     We will continue to follow. See additional note under Notes Tab for tentative f/u plan/dates.      04/26/2024  "

## 2024-04-26 NOTE — PROGRESS NOTES
Patient denies shortness of breath. Review of systems, G.I. she denies nausea or vomiting.    Physical exam general patient in no acute distress.    Assessment/plan 1. ESRD continue dialysis support.  Two. Calciphylaxis  continue wound care and sodium thiosulfate  Three. Anemia  Four. Lupus.

## 2024-04-26 NOTE — PLAN OF CARE
Chart reviewed. Continuing IV abx, wound care, and HD TTS. SS received call from Barbara at [facility] requesting update. Pt on day 10/15 for their bed hold policy. SS spoke with Dr. Rodrigues, pt will need at least 7 more days of IV abx (Zerbaxa). SS attempted to call Barbara back and left  at 1314. Progress notes faxed. SS following.

## 2024-04-26 NOTE — PLAN OF CARE
Problem: Skin Injury Risk Increased  Goal: Skin Health and Integrity  Outcome: Progressing     Problem: Adult Inpatient Plan of Care  Goal: Plan of Care Review  4/26/2024 0136 by Mariel Lam RN  Outcome: Progressing  4/26/2024 0135 by Mariel Lam RN  Outcome: Progressing  4/26/2024 0133 by Mariel Lam RN  Outcome: Progressing  Goal: Patient-Specific Goal (Individualized)  4/26/2024 0136 by Mariel Lam RN  Outcome: Progressing  4/26/2024 0135 by Mariel Lam RN  Outcome: Progressing  Goal: Absence of Hospital-Acquired Illness or Injury  4/26/2024 0136 by Mariel Lam RN  Outcome: Progressing  4/26/2024 0133 by Mariel Lam RN  Outcome: Progressing  Goal: Optimal Comfort and Wellbeing  4/26/2024 0136 by Mariel Lam RN  Outcome: Progressing  4/26/2024 0135 by Mariel Lam RN  Outcome: Progressing  4/26/2024 0133 by Mariel Lam RN  Outcome: Progressing  Goal: Readiness for Transition of Care  Outcome: Progressing     Problem: Infection  Goal: Absence of Infection Signs and Symptoms  Outcome: Progressing     Problem: Adjustment to Illness (Sepsis/Septic Shock)  Goal: Optimal Coping  Outcome: Progressing     Problem: Bleeding (Sepsis/Septic Shock)  Goal: Absence of Bleeding  Outcome: Progressing     Problem: Infection Progression (Sepsis/Septic Shock)  Goal: Absence of Infection Signs and Symptoms  Outcome: Progressing     Problem: Nutrition Impaired (Sepsis/Septic Shock)  Goal: Optimal Nutrition Intake  Outcome: Progressing     Problem: Device-Related Complication Risk (Hemodialysis)  Goal: Safe, Effective Therapy Delivery  4/26/2024 0143 by Mariel Lam RN  Outcome: Progressing  4/26/2024 0133 by Mariel Lam RN  Outcome: Progressing     Problem: Hemodynamic Instability (Hemodialysis)  Goal: Effective Tissue Perfusion  4/26/2024 0143 by Mariel Lam RN  Outcome: Progressing  4/26/2024 0133 by Mariel Lam RN  Outcome: Progressing     Problem: Infection (Hemodialysis)  Goal: Absence of  Infection Signs and Symptoms  4/26/2024 0143 by Mariel Lam, RN  Outcome: Progressing  4/26/2024 0133 by Mariel Lam, RN  Outcome: Progressing     Problem: Fall Injury Risk  Goal: Absence of Fall and Fall-Related Injury  Outcome: Progressing

## 2024-04-26 NOTE — PLAN OF CARE
Problem: Skin Injury Risk Increased  Goal: Skin Health and Integrity  Outcome: Progressing     Problem: Adult Inpatient Plan of Care  Goal: Plan of Care Review  4/26/2024 0135 by Mariel Lam RN  Outcome: Progressing  4/26/2024 0133 by Mariel Lam RN  Outcome: Progressing  Goal: Patient-Specific Goal (Individualized)  Outcome: Progressing  Goal: Absence of Hospital-Acquired Illness or Injury  Outcome: Progressing  Goal: Optimal Comfort and Wellbeing  4/26/2024 0135 by Mariel Lam RN  Outcome: Progressing  4/26/2024 0133 by Mariel Lam RN  Outcome: Progressing     Problem: Infection  Goal: Absence of Infection Signs and Symptoms  Outcome: Progressing     Problem: Adjustment to Illness (Sepsis/Septic Shock)  Goal: Optimal Coping  Outcome: Progressing     Problem: Bleeding (Sepsis/Septic Shock)  Goal: Absence of Bleeding  Outcome: Progressing     Problem: Infection Progression (Sepsis/Septic Shock)  Goal: Absence of Infection Signs and Symptoms  Outcome: Progressing     Problem: Nutrition Impaired (Sepsis/Septic Shock)  Goal: Optimal Nutrition Intake  Outcome: Progressing     Problem: Device-Related Complication Risk (Hemodialysis)  Goal: Safe, Effective Therapy Delivery  Outcome: Progressing     Problem: Hemodynamic Instability (Hemodialysis)  Goal: Effective Tissue Perfusion  Outcome: Progressing     Problem: Infection (Hemodialysis)  Goal: Absence of Infection Signs and Symptoms  Outcome: Progressing

## 2024-04-26 NOTE — ASSESSMENT & PLAN NOTE
Due to infection; on Merrem and Zerbaxa; completed Vancomycin for UTI; will monitor     denies pain/discomfort

## 2024-04-26 NOTE — ASSESSMENT & PLAN NOTE
On Merrem for the Citrobacter; has Psudomonas and Citrobacter in wound cultures; negative blood cultures; followed by wound care; spoke to ID at [facility] who suggested Zerbaxa for the Pseudomonas; On sodium thiosulfate; having fevers and is tachycardic--BP on the low side to start meds; will monitor; appreciate Surgical consult for evaluation of wounds

## 2024-04-26 NOTE — PLAN OF CARE
Problem: Skin Injury Risk Increased  Goal: Skin Health and Integrity  4/26/2024 0650 by Mariel Lam RN  Outcome: Progressing  4/26/2024 0133 by Mariel Lam RN  Outcome: Progressing     Problem: Adult Inpatient Plan of Care  Goal: Plan of Care Review  4/26/2024 0650 by Mariel Lam RN  Outcome: Progressing  4/26/2024 0136 by Mariel Lam RN  Outcome: Progressing  4/26/2024 0135 by Mariel Lam RN  Outcome: Progressing  4/26/2024 0133 by Mariel Lam RN  Outcome: Progressing  Goal: Patient-Specific Goal (Individualized)  4/26/2024 0650 by Mariel Lam RN  Outcome: Progressing  4/26/2024 0136 by Mariel Lam RN  Outcome: Progressing  4/26/2024 0135 by Mariel Lam RN  Outcome: Progressing  Goal: Absence of Hospital-Acquired Illness or Injury  4/26/2024 0650 by Mariel Lam RN  Outcome: Progressing  4/26/2024 0136 by Mariel Lam RN  Outcome: Progressing  4/26/2024 0133 by Mariel Lam RN  Outcome: Progressing  Goal: Optimal Comfort and Wellbeing  4/26/2024 0650 by Mariel Lam RN  Outcome: Progressing  4/26/2024 0136 by Mariel Lam RN  Outcome: Progressing  4/26/2024 0135 by Mariel Lam RN  Outcome: Progressing  4/26/2024 0133 by Mariel Lam RN  Outcome: Progressing  Goal: Readiness for Transition of Care  Outcome: Progressing     Problem: Infection  Goal: Absence of Infection Signs and Symptoms  Outcome: Progressing     Problem: Adjustment to Illness (Sepsis/Septic Shock)  Goal: Optimal Coping  Outcome: Progressing     Problem: Bleeding (Sepsis/Septic Shock)  Goal: Absence of Bleeding  Outcome: Progressing     Problem: Infection Progression (Sepsis/Septic Shock)  Goal: Absence of Infection Signs and Symptoms  Outcome: Progressing     Problem: Nutrition Impaired (Sepsis/Septic Shock)  Goal: Optimal Nutrition Intake  Outcome: Progressing     Problem: Device-Related Complication Risk (Hemodialysis)  Goal: Safe, Effective Therapy Delivery  4/26/2024 0143 by Mariel Lam  RN  Outcome: Progressing  4/26/2024 0133 by Mariel Lam RN  Outcome: Progressing     Problem: Hemodynamic Instability (Hemodialysis)  Goal: Effective Tissue Perfusion  4/26/2024 0143 by Mariel Lam RN  Outcome: Progressing  4/26/2024 0133 by Mariel Lam RN  Outcome: Progressing     Problem: Infection (Hemodialysis)  Goal: Absence of Infection Signs and Symptoms  4/26/2024 0143 by Mariel Lam RN  Outcome: Progressing  4/26/2024 0133 by Mariel Lam RN  Outcome: Progressing     Problem: Fall Injury Risk  Goal: Absence of Fall and Fall-Related Injury  Outcome: Progressing

## 2024-04-26 NOTE — PROGRESS NOTES
04/26/24 1152   Wound Care Follow Up   Wound Care Follow-up? Yes   Wound Care- Next Visit Date 05/01/24   Follow Up Plan Paula POC

## 2024-04-26 NOTE — PLAN OF CARE
Problem: Skin Injury Risk Increased  Goal: Skin Health and Integrity  Outcome: Progressing     Problem: Adult Inpatient Plan of Care  Goal: Plan of Care Review  Outcome: Progressing  Goal: Absence of Hospital-Acquired Illness or Injury  Outcome: Progressing  Goal: Optimal Comfort and Wellbeing  Outcome: Progressing     Problem: Infection  Goal: Absence of Infection Signs and Symptoms  Outcome: Progressing     Problem: Adjustment to Illness (Sepsis/Septic Shock)  Goal: Optimal Coping  Outcome: Progressing     Problem: Bleeding (Sepsis/Septic Shock)  Goal: Absence of Bleeding  Outcome: Progressing     Problem: Infection Progression (Sepsis/Septic Shock)  Goal: Absence of Infection Signs and Symptoms  Outcome: Progressing     Problem: Nutrition Impaired (Sepsis/Septic Shock)  Goal: Optimal Nutrition Intake  Outcome: Progressing     Problem: Device-Related Complication Risk (Hemodialysis)  Goal: Safe, Effective Therapy Delivery  Outcome: Progressing     Problem: Hemodynamic Instability (Hemodialysis)  Goal: Effective Tissue Perfusion  Outcome: Progressing     Problem: Infection (Hemodialysis)  Goal: Absence of Infection Signs and Symptoms  Outcome: Progressing

## 2024-04-26 NOTE — PROGRESS NOTES
[facility] - [address] [place] [address] Medicine  Progress Note    Patient Name: Madhav Correa  MRN: [id number]  Patient Class: IP- Inpatient   Admission Date: 4/17/2024  Length of Stay: 8 days  Attending Physician: Sugar Garcia DO  Primary Care Provider: [facility]        Subjective:     Principal Problem:Calciphylaxis with nonhealing ulcer of leg        HPI:  32 yo F presents to [facility] ED from [facility] for worsening wounds.  Patient is severely debilitated due to SLE.  She was diagnosed five years ago and has suffered secondary hypertension and  ESRD and is now on HD TTS.  She has also developed severe calciphylaxis during her daily dialysis when she was hospitalized at [facility].  Patient lives in [place] and was transferred for wound care to [place] because they were one of few nursing homes that would accept HD patients.      This patient has a tragic story and I have read the dc summary from 4/424 regarding the eight week course of thiosulfate and the calciphylaxis becoming worse.  She has developed severe wounds on her legs and buttocks (see photos).  She was on meropenum and levaquin in hospital for MSSA and pseudomonas.  She was discharged to the nursing home with wound care and levaquin.  Patient has worsening wounds and the nursing home sent her to [facility] ED for further evaluation and admission.  Prior to transfer from [facility], the discharging physician and the nephrologist and ID physician spoke to her about considering hospice/palliative care.  Patient was adamant about being a full code.  She has two children (14 yo and 12 yo).  She wants to raise her children.      She states that she is able to stand and walk some with assistance.  She does move her upper extremities and feeds herself but has generalized weakness of all extremities but says she is not familiar with the term transverse myelitis and is not sure why her  extremities so weak (3/5 of all four - able to lift gravity).  She has some atrophy noted but most of her leg problems may be from being bedridden with these wounds.  She has been on steroids previously for lupus flares but not chronically.       Overview/Hospital Course:  33 year old female with an extensive PMH presents with calciphylaxis and is ESRD.  Patient denies chest pain, shortness of breath, nausea, vomiting, or diarrhea.      Vitals:    04/26/24 0827 04/26/24 0845 04/26/24 1108 04/26/24 1220   BP:  124/84  115/82   BP Location:  Right arm  Right arm   Patient Position:  Lying  Lying   Pulse:  (!) 126  (!) 124   Resp: 18 18 18 18   Temp:  97.6 °F (36.4 °C)  97.4 °F (36.3 °C)   TempSrc:  Axillary  Oral   SpO2:  (!) 94%  95%   Weight:       Height:           PHYSICAL EXAM:    GEN: NAD; alert and oriented x 3  CVS: regular rate and rhythm; no murmurs  RESP: clear to auscultation bilaterally; no rhonchi, rales, or wheezes noted  GI: soft, non-tender, non-distended; + bowel sounds  EXTR: bilateral LE wounds      Assessment/Plan:      * Calciphylaxis with nonhealing ulcer of leg  On Merrem for the Citrobacter; has Psudomonas and Citrobacter in wound cultures; negative blood cultures; followed by wound care; spoke to ID at [facility] who suggested Zerbaxa for the Pseudomonas; On sodium thiosulfate; having fevers and is tachycardic--BP on the low side to start meds; will monitor; appreciate Surgical consult for evaluation of wounds    Acute cystitis without hematuria  Completed Vancomycin for Staph epidermidis in urine culture but may be contamination    ESRD (end stage renal disease) on dialysis  On HD    Fever  Due to infection; on Merrem and Zerbaxa; completed Vancomycin for UTI; will monitor      Tachycardia  Due to infection; BP is too low to give BP meds; will monitor      Acute blood loss anemia  S/P transfusion on dialysis; occult blood negative; s/p surgical intervention; Hgb ok    Sacral decubitus ulcer  On  Merrem and Zerbaxa; followed by wound care; s/p debridement    SLE (systemic lupus erythematosus)  No active disease noted at this time    Anemia in chronic kidney disease  S/p transfusion on dialysis; monitor Hgb; Hgb ok              Sugar Garcia DO  Department of Hospital Medicine   [facility] - [facility] [address] [facility] Tele[facility]

## 2024-04-26 NOTE — PLAN OF CARE
Problem: Skin Injury Risk Increased  Goal: Skin Health and Integrity  Outcome: Progressing     Problem: Adult Inpatient Plan of Care  Goal: Plan of Care Review  4/26/2024 0136 by Mariel Lam RN  Outcome: Progressing  4/26/2024 0135 by Mariel Lam RN  Outcome: Progressing  4/26/2024 0133 by Mariel Lam RN  Outcome: Progressing  Goal: Patient-Specific Goal (Individualized)  4/26/2024 0136 by Mariel Lam RN  Outcome: Progressing  4/26/2024 0135 by Mariel Lam RN  Outcome: Progressing  Goal: Absence of Hospital-Acquired Illness or Injury  4/26/2024 0136 by Mariel Lam RN  Outcome: Progressing  4/26/2024 0133 by Mariel Lam RN  Outcome: Progressing  Goal: Optimal Comfort and Wellbeing  4/26/2024 0136 by Mariel Lam RN  Outcome: Progressing  4/26/2024 0135 by Mariel Lam RN  Outcome: Progressing  4/26/2024 0133 by Mariel Lam RN  Outcome: Progressing  Goal: Readiness for Transition of Care  Outcome: Progressing     Problem: Infection  Goal: Absence of Infection Signs and Symptoms  Outcome: Progressing     Problem: Adjustment to Illness (Sepsis/Septic Shock)  Goal: Optimal Coping  Outcome: Progressing     Problem: Bleeding (Sepsis/Septic Shock)  Goal: Absence of Bleeding  Outcome: Progressing     Problem: Infection Progression (Sepsis/Septic Shock)  Goal: Absence of Infection Signs and Symptoms  Outcome: Progressing     Problem: Nutrition Impaired (Sepsis/Septic Shock)  Goal: Optimal Nutrition Intake  Outcome: Progressing     Problem: Device-Related Complication Risk (Hemodialysis)  Goal: Safe, Effective Therapy Delivery  Outcome: Progressing     Problem: Hemodynamic Instability (Hemodialysis)  Goal: Effective Tissue Perfusion  Outcome: Progressing     Problem: Infection (Hemodialysis)  Goal: Absence of Infection Signs and Symptoms  Outcome: Progressing

## 2024-04-27 LAB
ALBUMIN SERPL BCP-MCNC: 1.6 G/DL (ref 3.5–5)
ANION GAP SERPL CALCULATED.3IONS-SCNC: 10 MMOL/L (ref 7–16)
BASOPHILS # BLD AUTO: 0.03 K/UL (ref 0–0.2)
BASOPHILS NFR BLD AUTO: 0.3 % (ref 0–1)
BUN SERPL-MCNC: 38 MG/DL (ref 7–18)
BUN/CREAT SERPL: 8 (ref 6–20)
CALCIUM SERPL-MCNC: 8.8 MG/DL (ref 8.5–10.1)
CHLORIDE SERPL-SCNC: 105 MMOL/L (ref 98–107)
CO2 SERPL-SCNC: 27 MMOL/L (ref 21–32)
CREAT SERPL-MCNC: 4.82 MG/DL (ref 0.55–1.02)
DIFFERENTIAL METHOD BLD: ABNORMAL
EGFR (NO RACE VARIABLE) (RUSH/TITUS): 12 ML/MIN/1.73M2
EOSINOPHIL # BLD AUTO: 0.27 K/UL (ref 0–0.5)
EOSINOPHIL NFR BLD AUTO: 3.1 % (ref 1–4)
ERYTHROCYTE [DISTWIDTH] IN BLOOD BY AUTOMATED COUNT: 19.8 % (ref 11.5–14.5)
GLUCOSE SERPL-MCNC: 83 MG/DL (ref 74–106)
HCT VFR BLD AUTO: 29.1 % (ref 38–47)
HGB BLD-MCNC: 8.7 G/DL (ref 12–16)
IMM GRANULOCYTES # BLD AUTO: 0.07 K/UL (ref 0–0.04)
IMM GRANULOCYTES NFR BLD: 0.8 % (ref 0–0.4)
LYMPHOCYTES # BLD AUTO: 2.43 K/UL (ref 1–4.8)
LYMPHOCYTES NFR BLD AUTO: 27.5 % (ref 27–41)
MCH RBC QN AUTO: 27.1 PG (ref 27–31)
MCHC RBC AUTO-ENTMCNC: 29.9 G/DL (ref 32–36)
MCV RBC AUTO: 90.7 FL (ref 80–96)
MONOCYTES # BLD AUTO: 1.32 K/UL (ref 0–0.8)
MONOCYTES NFR BLD AUTO: 14.9 % (ref 2–6)
MPC BLD CALC-MCNC: 9.9 FL (ref 9.4–12.4)
NEUTROPHILS # BLD AUTO: 4.73 K/UL (ref 1.8–7.7)
NEUTROPHILS NFR BLD AUTO: 53.4 % (ref 53–65)
NRBC # BLD AUTO: 0 X10E3/UL
NRBC, AUTO (.00): 0 %
PHOSPHATE SERPL-MCNC: 3.4 MG/DL (ref 2.5–4.5)
PLATELET # BLD AUTO: 264 K/UL (ref 150–400)
POTASSIUM SERPL-SCNC: 5 MMOL/L (ref 3.5–5.1)
RBC # BLD AUTO: 3.21 M/UL (ref 4.2–5.4)
SODIUM SERPL-SCNC: 137 MMOL/L (ref 136–145)
WBC # BLD AUTO: 8.85 K/UL (ref 4.5–11)

## 2024-04-27 PROCEDURE — 25000003 PHARM REV CODE 250: Performed by: HOSPITALIST

## 2024-04-27 PROCEDURE — 90935 HEMODIALYSIS ONE EVALUATION: CPT

## 2024-04-27 PROCEDURE — 80069 RENAL FUNCTION PANEL: CPT | Performed by: STUDENT IN AN ORGANIZED HEALTH CARE EDUCATION/TRAINING PROGRAM

## 2024-04-27 PROCEDURE — 25000003 PHARM REV CODE 250: Performed by: INTERNAL MEDICINE

## 2024-04-27 PROCEDURE — 25000003 PHARM REV CODE 250: Performed by: STUDENT IN AN ORGANIZED HEALTH CARE EDUCATION/TRAINING PROGRAM

## 2024-04-27 PROCEDURE — 63600175 PHARM REV CODE 636 W HCPCS: Performed by: HOSPITALIST

## 2024-04-27 PROCEDURE — 25000003 PHARM REV CODE 250: Performed by: FAMILY MEDICINE

## 2024-04-27 PROCEDURE — 99233 SBSQ HOSP IP/OBS HIGH 50: CPT | Mod: ,,, | Performed by: HOSPITALIST

## 2024-04-27 PROCEDURE — 36415 COLL VENOUS BLD VENIPUNCTURE: CPT | Performed by: STUDENT IN AN ORGANIZED HEALTH CARE EDUCATION/TRAINING PROGRAM

## 2024-04-27 PROCEDURE — 85025 COMPLETE CBC W/AUTO DIFF WBC: CPT | Performed by: STUDENT IN AN ORGANIZED HEALTH CARE EDUCATION/TRAINING PROGRAM

## 2024-04-27 PROCEDURE — 63600175 PHARM REV CODE 636 W HCPCS: Mod: JZ,JG | Performed by: HOSPITALIST

## 2024-04-27 PROCEDURE — 63600175 PHARM REV CODE 636 W HCPCS: Performed by: FAMILY MEDICINE

## 2024-04-27 PROCEDURE — 63600175 PHARM REV CODE 636 W HCPCS: Mod: JZ,EC,JG | Performed by: INTERNAL MEDICINE

## 2024-04-27 PROCEDURE — 11000001 HC ACUTE MED/SURG PRIVATE ROOM

## 2024-04-27 RX ORDER — HEPARIN SODIUM 1000 [USP'U]/ML
4000 INJECTION, SOLUTION INTRAVENOUS; SUBCUTANEOUS
Status: DISCONTINUED | OUTPATIENT
Start: 2024-04-27 | End: 2024-04-27

## 2024-04-27 RX ADMIN — OXYCODONE 15 MG: 5 TABLET ORAL at 04:04

## 2024-04-27 RX ADMIN — CEFTOLOZANE AND TAZOBACTAM 450 MG: 1; .5 INJECTION, POWDER, LYOPHILIZED, FOR SOLUTION INTRAVENOUS at 01:04

## 2024-04-27 RX ADMIN — CEFTOLOZANE AND TAZOBACTAM 450 MG: 1; .5 INJECTION, POWDER, LYOPHILIZED, FOR SOLUTION INTRAVENOUS at 08:04

## 2024-04-27 RX ADMIN — OXYCODONE 15 MG: 5 TABLET ORAL at 08:04

## 2024-04-27 RX ADMIN — CEFTOLOZANE AND TAZOBACTAM 450 MG: 1; .5 INJECTION, POWDER, LYOPHILIZED, FOR SOLUTION INTRAVENOUS at 04:04

## 2024-04-27 RX ADMIN — Medication 1 EACH: at 08:04

## 2024-04-27 RX ADMIN — BUPROPION HYDROCHLORIDE 75 MG: 75 TABLET, FILM COATED ORAL at 08:04

## 2024-04-27 RX ADMIN — HYDROMORPHONE HYDROCHLORIDE 1 MG: 2 INJECTION, SOLUTION INTRAMUSCULAR; INTRAVENOUS; SUBCUTANEOUS at 01:04

## 2024-04-27 RX ADMIN — SEVELAMER CARBONATE 1600 MG: 800 TABLET, FILM COATED ORAL at 05:04

## 2024-04-27 RX ADMIN — BUPROPION HYDROCHLORIDE 75 MG: 75 TABLET, FILM COATED ORAL at 09:04

## 2024-04-27 RX ADMIN — SENNOSIDES AND DOCUSATE SODIUM 2 TABLET: 8.6; 5 TABLET ORAL at 09:04

## 2024-04-27 RX ADMIN — CARVEDILOL 25 MG: 25 TABLET, FILM COATED ORAL at 05:04

## 2024-04-27 RX ADMIN — MEROPENEM 500 MG: 500 INJECTION, POWDER, FOR SOLUTION INTRAVENOUS at 05:04

## 2024-04-27 RX ADMIN — OXYCODONE 15 MG: 5 TABLET ORAL at 02:04

## 2024-04-27 RX ADMIN — MUPIROCIN: 20 OINTMENT TOPICAL at 09:04

## 2024-04-27 RX ADMIN — SEVELAMER CARBONATE 1600 MG: 800 TABLET, FILM COATED ORAL at 08:04

## 2024-04-27 RX ADMIN — HYDROMORPHONE HYDROCHLORIDE 1 MG: 2 INJECTION, SOLUTION INTRAMUSCULAR; INTRAVENOUS; SUBCUTANEOUS at 03:04

## 2024-04-27 RX ADMIN — SODIUM THIOSULFATE 25 G: 250 INJECTION, SOLUTION INTRAVENOUS at 11:04

## 2024-04-27 RX ADMIN — ISOSORBIDE MONONITRATE 30 MG: 30 TABLET, EXTENDED RELEASE ORAL at 09:04

## 2024-04-27 RX ADMIN — CARVEDILOL 25 MG: 25 TABLET, FILM COATED ORAL at 07:04

## 2024-04-27 RX ADMIN — SEVELAMER CARBONATE 1600 MG: 800 TABLET, FILM COATED ORAL at 01:04

## 2024-04-27 RX ADMIN — SODIUM CHLORIDE 2000 ML: 9 INJECTION, SOLUTION INTRAVENOUS at 11:04

## 2024-04-27 RX ADMIN — SENNOSIDES AND DOCUSATE SODIUM 2 TABLET: 8.6; 5 TABLET ORAL at 08:04

## 2024-04-27 RX ADMIN — HEPARIN SODIUM 5000 UNITS: 5000 INJECTION, SOLUTION INTRAVENOUS; SUBCUTANEOUS at 09:04

## 2024-04-27 RX ADMIN — HYDROMORPHONE HYDROCHLORIDE 1 MG: 2 INJECTION, SOLUTION INTRAMUSCULAR; INTRAVENOUS; SUBCUTANEOUS at 08:04

## 2024-04-27 RX ADMIN — FAMOTIDINE 20 MG: 20 TABLET ORAL at 09:04

## 2024-04-27 RX ADMIN — HEPARIN SODIUM 5000 UNITS: 5000 INJECTION, SOLUTION INTRAVENOUS; SUBCUTANEOUS at 08:04

## 2024-04-27 RX ADMIN — EPOETIN ALFA 10000 UNITS: 10000 SOLUTION INTRAVENOUS; SUBCUTANEOUS at 11:04

## 2024-04-27 RX ADMIN — OLANZAPINE 5 MG: 5 TABLET, FILM COATED ORAL at 08:04

## 2024-04-27 NOTE — PLAN OF CARE
Problem: Wound  Goal: Optimal Pain Control and Function  Outcome: Progressing  Intervention: Prevent or Manage Pain  Flowsheets (Taken 4/27/2024 6696)  Sleep/Rest Enhancement:   relaxation techniques promoted   room darkened  Pain Management Interventions:   medication offered   position adjusted   relaxation techniques promoted

## 2024-04-27 NOTE — DIALYSIS ROUNDING
Ms. Correa is seen during her dialysis.  She is awake and tolerating the procedure well.  She interacts very little with the examiner but is in no distress.  She has no significant edema.  1 L of fluid is being removed during today's dialysis.  We will continue with her dialysis support per schedule.

## 2024-04-28 LAB
ALBUMIN SERPL BCP-MCNC: 1.5 G/DL (ref 3.5–5)
ANION GAP SERPL CALCULATED.3IONS-SCNC: 11 MMOL/L (ref 7–16)
BASOPHILS # BLD AUTO: 0.02 K/UL (ref 0–0.2)
BASOPHILS NFR BLD AUTO: 0.3 % (ref 0–1)
BUN SERPL-MCNC: 26 MG/DL (ref 7–18)
BUN/CREAT SERPL: 7 (ref 6–20)
CALCIUM SERPL-MCNC: 8 MG/DL (ref 8.5–10.1)
CHLORIDE SERPL-SCNC: 104 MMOL/L (ref 98–107)
CO2 SERPL-SCNC: 28 MMOL/L (ref 21–32)
CREAT SERPL-MCNC: 3.65 MG/DL (ref 0.55–1.02)
DIFFERENTIAL METHOD BLD: ABNORMAL
EGFR (NO RACE VARIABLE) (RUSH/TITUS): 16 ML/MIN/1.73M2
EOSINOPHIL # BLD AUTO: 0.29 K/UL (ref 0–0.5)
EOSINOPHIL NFR BLD AUTO: 4 % (ref 1–4)
ERYTHROCYTE [DISTWIDTH] IN BLOOD BY AUTOMATED COUNT: 20 % (ref 11.5–14.5)
GLUCOSE SERPL-MCNC: 93 MG/DL (ref 74–106)
HCT VFR BLD AUTO: 24.3 % (ref 38–47)
HGB BLD-MCNC: 7.4 G/DL (ref 12–16)
IMM GRANULOCYTES # BLD AUTO: 0.11 K/UL (ref 0–0.04)
IMM GRANULOCYTES NFR BLD: 1.5 % (ref 0–0.4)
LYMPHOCYTES # BLD AUTO: 1.75 K/UL (ref 1–4.8)
LYMPHOCYTES NFR BLD AUTO: 24.3 % (ref 27–41)
MCH RBC QN AUTO: 27.4 PG (ref 27–31)
MCHC RBC AUTO-ENTMCNC: 30.5 G/DL (ref 32–36)
MCV RBC AUTO: 90 FL (ref 80–96)
MONOCYTES # BLD AUTO: 1.08 K/UL (ref 0–0.8)
MONOCYTES NFR BLD AUTO: 15 % (ref 2–6)
MPC BLD CALC-MCNC: 10.3 FL (ref 9.4–12.4)
NEUTROPHILS # BLD AUTO: 3.95 K/UL (ref 1.8–7.7)
NEUTROPHILS NFR BLD AUTO: 54.9 % (ref 53–65)
NRBC # BLD AUTO: 0 X10E3/UL
NRBC, AUTO (.00): 0 %
OHS QRS DURATION: 84 MS
OHS QTC CALCULATION: 474 MS
PHOSPHATE SERPL-MCNC: 2.3 MG/DL (ref 2.5–4.5)
PLATELET # BLD AUTO: 247 K/UL (ref 150–400)
POTASSIUM SERPL-SCNC: 4.3 MMOL/L (ref 3.5–5.1)
RBC # BLD AUTO: 2.7 M/UL (ref 4.2–5.4)
SODIUM SERPL-SCNC: 139 MMOL/L (ref 136–145)
WBC # BLD AUTO: 7.2 K/UL (ref 4.5–11)

## 2024-04-28 PROCEDURE — 63600175 PHARM REV CODE 636 W HCPCS: Mod: JZ,JG | Performed by: HOSPITALIST

## 2024-04-28 PROCEDURE — 99232 SBSQ HOSP IP/OBS MODERATE 35: CPT | Mod: ,,, | Performed by: HOSPITALIST

## 2024-04-28 PROCEDURE — 63600175 PHARM REV CODE 636 W HCPCS: Performed by: HOSPITALIST

## 2024-04-28 PROCEDURE — 63600175 PHARM REV CODE 636 W HCPCS: Performed by: FAMILY MEDICINE

## 2024-04-28 PROCEDURE — 25000003 PHARM REV CODE 250: Performed by: HOSPITALIST

## 2024-04-28 PROCEDURE — 36415 COLL VENOUS BLD VENIPUNCTURE: CPT | Performed by: STUDENT IN AN ORGANIZED HEALTH CARE EDUCATION/TRAINING PROGRAM

## 2024-04-28 PROCEDURE — 85025 COMPLETE CBC W/AUTO DIFF WBC: CPT | Performed by: STUDENT IN AN ORGANIZED HEALTH CARE EDUCATION/TRAINING PROGRAM

## 2024-04-28 PROCEDURE — 11000001 HC ACUTE MED/SURG PRIVATE ROOM

## 2024-04-28 PROCEDURE — 25000003 PHARM REV CODE 250: Performed by: FAMILY MEDICINE

## 2024-04-28 PROCEDURE — 80069 RENAL FUNCTION PANEL: CPT | Performed by: STUDENT IN AN ORGANIZED HEALTH CARE EDUCATION/TRAINING PROGRAM

## 2024-04-28 RX ADMIN — Medication 1 EACH: at 09:04

## 2024-04-28 RX ADMIN — OXYCODONE 15 MG: 5 TABLET ORAL at 09:04

## 2024-04-28 RX ADMIN — SEVELAMER CARBONATE 1600 MG: 800 TABLET, FILM COATED ORAL at 07:04

## 2024-04-28 RX ADMIN — HYDROMORPHONE HYDROCHLORIDE 1 MG: 2 INJECTION, SOLUTION INTRAMUSCULAR; INTRAVENOUS; SUBCUTANEOUS at 02:04

## 2024-04-28 RX ADMIN — BUPROPION HYDROCHLORIDE 75 MG: 75 TABLET, FILM COATED ORAL at 09:04

## 2024-04-28 RX ADMIN — SENNOSIDES AND DOCUSATE SODIUM 2 TABLET: 8.6; 5 TABLET ORAL at 09:04

## 2024-04-28 RX ADMIN — HYDROMORPHONE HYDROCHLORIDE 1 MG: 2 INJECTION, SOLUTION INTRAMUSCULAR; INTRAVENOUS; SUBCUTANEOUS at 08:04

## 2024-04-28 RX ADMIN — HYDROMORPHONE HYDROCHLORIDE 1 MG: 2 INJECTION, SOLUTION INTRAMUSCULAR; INTRAVENOUS; SUBCUTANEOUS at 07:04

## 2024-04-28 RX ADMIN — HEPARIN SODIUM 5000 UNITS: 5000 INJECTION, SOLUTION INTRAVENOUS; SUBCUTANEOUS at 09:04

## 2024-04-28 RX ADMIN — MUPIROCIN: 20 OINTMENT TOPICAL at 09:04

## 2024-04-28 RX ADMIN — CEFTOLOZANE AND TAZOBACTAM 450 MG: 1; .5 INJECTION, POWDER, LYOPHILIZED, FOR SOLUTION INTRAVENOUS at 12:04

## 2024-04-28 RX ADMIN — CARVEDILOL 25 MG: 25 TABLET, FILM COATED ORAL at 08:04

## 2024-04-28 RX ADMIN — OXYCODONE 15 MG: 5 TABLET ORAL at 03:04

## 2024-04-28 RX ADMIN — CARVEDILOL 25 MG: 25 TABLET, FILM COATED ORAL at 05:04

## 2024-04-28 RX ADMIN — CEFTOLOZANE AND TAZOBACTAM 450 MG: 1; .5 INJECTION, POWDER, LYOPHILIZED, FOR SOLUTION INTRAVENOUS at 03:04

## 2024-04-28 RX ADMIN — OLANZAPINE 5 MG: 5 TABLET, FILM COATED ORAL at 09:04

## 2024-04-28 RX ADMIN — FAMOTIDINE 20 MG: 20 TABLET ORAL at 09:04

## 2024-04-28 RX ADMIN — TRAZODONE HYDROCHLORIDE 50 MG: 50 TABLET ORAL at 09:04

## 2024-04-28 RX ADMIN — CEFTOLOZANE AND TAZOBACTAM 450 MG: 1; .5 INJECTION, POWDER, LYOPHILIZED, FOR SOLUTION INTRAVENOUS at 08:04

## 2024-04-28 RX ADMIN — ISOSORBIDE MONONITRATE 30 MG: 30 TABLET, EXTENDED RELEASE ORAL at 09:04

## 2024-04-28 RX ADMIN — SEVELAMER CARBONATE 1600 MG: 800 TABLET, FILM COATED ORAL at 12:04

## 2024-04-28 RX ADMIN — SEVELAMER CARBONATE 1600 MG: 800 TABLET, FILM COATED ORAL at 05:04

## 2024-04-28 RX ADMIN — MEROPENEM 500 MG: 500 INJECTION, POWDER, FOR SOLUTION INTRAVENOUS at 03:04

## 2024-04-28 NOTE — ASSESSMENT & PLAN NOTE
On Merrem and Zerbaxa; followed by wound care; s/p debridement    04/27 discuss with surgery if deep tissue infection or if bacterium superifical isolates.  Cultures include pseudomonas MDRO / CPPA

## 2024-04-28 NOTE — ASSESSMENT & PLAN NOTE
On Merrem for the Citrobacter; has Psudomonas and Citrobacter in wound cultures; negative blood cultures; followed by wound care; spoke to ID at [facility] who suggested Zerbaxa for the Pseudomonas; On sodium thiosulfate; having fevers and is tachycardic--BP on the low side to start meds; will monitor; appreciate Surgical consult for evaluation of wounds    04/27 discuss with surgery if these organism deep or superficial colonization

## 2024-04-28 NOTE — ASSESSMENT & PLAN NOTE
Due to infection; on Merrem and Zerbaxa; completed Vancomycin for UTI; will monitor    04/27 none current

## 2024-04-28 NOTE — SUBJECTIVE & OBJECTIVE
Interval History:     Review of Systems   Constitutional:  Negative for appetite change, fatigue and fever.   HENT:  Negative for congestion, hearing loss and trouble swallowing.    Respiratory:  Negative for chest tightness, shortness of breath and wheezing.    Cardiovascular:  Negative for chest pain and palpitations.   Gastrointestinal:  Negative for abdominal pain, constipation and nausea.   Genitourinary:  Negative for difficulty urinating and dysuria.   Musculoskeletal:  Negative for back pain and neck stiffness.   Skin:  Positive for wound. Negative for pallor and rash.        Leg and sacral wounds   Neurological:  Negative for dizziness, speech difficulty and headaches.   Psychiatric/Behavioral:  Negative for confusion and suicidal ideas.      Objective:     Vital Signs (Most Recent):  Temp: 98.4 °F (36.9 °C) (04/28/24 1129)  Pulse: 100 (04/28/24 1129)  Resp: 17 (04/28/24 1422)  BP: (!) 145/97 (04/28/24 1129)  SpO2: 96 % (04/28/24 1129) Vital Signs (24h Range):  Temp:  [97.5 °F (36.4 °C)-99.7 °F (37.6 °C)] 98.4 °F (36.9 °C)  Pulse:  [] 100  Resp:  [16-20] 17  SpO2:  [96 %-98 %] 96 %  BP: (128-145)/(81-97) 145/97     Weight: 67.6 kg (149 lb 1.6 oz)  Body mass index is 22.02 kg/m².    Intake/Output Summary (Last 24 hours) at 4/28/2024 1446  Last data filed at 4/28/2024 1243  Gross per 24 hour   Intake 720 ml   Output --   Net 720 ml         Physical Exam  Vitals reviewed.   Constitutional:       General: She is awake. She is not in acute distress.     Appearance: She is well-developed. She is not toxic-appearing.   HENT:      Head: Normocephalic.      Nose: Nose normal.      Mouth/Throat:      Pharynx: Oropharynx is clear.   Eyes:      Extraocular Movements: Extraocular movements intact.      Pupils: Pupils are equal, round, and reactive to light.   Neck:      Thyroid: No thyroid mass.      Vascular: No carotid bruit.   Cardiovascular:      Rate and Rhythm: Normal rate and regular rhythm.      Pulses:  Normal pulses.      Heart sounds: Normal heart sounds. No murmur heard.  Pulmonary:      Effort: Pulmonary effort is normal.      Breath sounds: Normal breath sounds and air entry. No wheezing.   Abdominal:      General: Bowel sounds are normal. There is no distension.      Palpations: Abdomen is soft.      Tenderness: There is no abdominal tenderness.   Musculoskeletal:         General: Normal range of motion.      Cervical back: Neck supple. No rigidity.      Comments: AV Fistula left upper arm   Skin:     General: Skin is warm.      Coloration: Skin is not jaundiced.      Findings: Lesion present.   Neurological:      General: No focal deficit present.      Mental Status: She is alert and oriented to person, place, and time.      Cranial Nerves: No cranial nerve deficit.   Psychiatric:         Attention and Perception: Attention normal.         Mood and Affect: Mood normal.         Behavior: Behavior normal. Behavior is cooperative.         Thought Content: Thought content normal.         Cognition and Memory: Cognition normal.             Significant Labs: All pertinent labs within the past 24 hours have been reviewed.  BMP:   Recent Labs   Lab 04/28/24 0236   GLU 93      K 4.3      CO2 28   BUN 26*   CREATININE 3.65*   CALCIUM 8.0*     CBC:   Recent Labs   Lab 04/27/24 0247 04/28/24 0236   WBC 8.85 7.20   HGB 8.7* 7.4*   HCT 29.1* 24.3*    247     CMP:   Recent Labs   Lab 04/27/24 0247 04/28/24 0236    139   K 5.0 4.3    104   CO2 27 28   GLU 83 93   BUN 38* 26*   CREATININE 4.82* 3.65*   CALCIUM 8.8 8.0*   ALBUMIN 1.6* 1.5*   ANIONGAP 10 11       Significant Imaging: I have reviewed all pertinent imaging results/findings within the past 24 hours.    Imaging Results              X-Ray Chest AP Portable (Final result)  Result time 04/18/24 07:40:06      Final result by Phillip Fu II, MD (04/18/24 07:40:06)                   Impression:      No evidence of acute  cardiopulmonary disease.      Electronically signed by: Phillip Fu  Date:    04/18/2024  Time:    07:40               Narrative:    EXAMINATION:  XR CHEST AP PORTABLE    CLINICAL HISTORY:  Fever, unspecified    COMPARISON:  None available    TECHNIQUE:  XR CHEST AP PORTABLE    FINDINGS:  The heart and mediastinum are normal in size and configuration.  The pulmonary vascularity is normal in caliber.  No lung infiltrates, effusions, pneumothorax or other abnormality is demonstrated.                                    Intake/Output - Last 3 Shifts         04/26 0700 04/27 0659 04/27 0700 04/28 0659 04/28 0700 04/29 0659    P.O. 900 600 480    I.V. (mL/kg)  399.5 (5.9)     Other  750     IV Piggyback 199.9 949.8     Total Intake(mL/kg) 1099.9 (16.5) 2699.3 (39.9) 480 (7.1)    Urine (mL/kg/hr) 400 (0.3) 0 (0)     Other  1750     Stool  0     Total Output 400 1750     Net +699.9 +949.3 +480           Urine Occurrence 1 x 3 x 1 x    Stool Occurrence  1 x           Microbiology Results (last 7 days)       Procedure Component Value Units Date/Time    Culture, Wound [[id number]]  (Abnormal)  (Susceptibility) Collected: 04/23/24 1744    Order Status: Completed Specimen: Wound from Leg, Right Updated: 04/25/24 0745     Culture, Wound/Abscess Heavy Growth Pseudomonas aeruginosa     Comment: MDRO (Multiple drug resistant organism)  CRPA (Carbapenem-resistant Pseudomonas areuginosa)       Blood culture x two cultures. Draw prior to antibiotics. [[id number]] Collected: 04/17/24 2332    Order Status: Completed Specimen: Blood Updated: 04/23/24 0632     Culture, Blood No Growth at 5 days    Blood culture x two cultures. Draw prior to antibiotics. [[id number]] Collected: 04/17/24 2327    Order Status: Completed Specimen: Blood Updated: 04/23/24 0631     Culture, Blood No Growth at 5 days

## 2024-04-28 NOTE — PROGRESS NOTES
[facility] - [address] [place] [address] Medicine  Progress Note    Patient Name: Madhav Correa  MRN: [id number]  Patient Class: IP- Inpatient   Admission Date: 4/17/2024  Length of Stay: 10 days  Attending Physician: Stef Emanuel MD  Primary Care Provider: Rehab, [facility]        Subjective:     Principal Problem:Calciphylaxis with nonhealing ulcer of leg        HPI:  34 yo F presents to [facility] ED from [facility] for worsening wounds.  Patient is severely debilitated due to SLE.  She was diagnosed five years ago and has suffered secondary hypertension and  ESRD and is now on HD TTS.  She has also developed severe calciphylaxis during her daily dialysis when she was hospitalized at [facility].  Patient lives in [place] and was transferred for wound care to [facility] because they were one of few nursing homes that would accept HD patients.      This patient has a tragic story and I have read the dc summary from 4/424 regarding the eight week course of thiosulfate and the calciphylaxis becoming worse.  She has developed severe wounds on her legs and buttocks (see photos).  She was on meropenum and levaquin in hospital for MSSA and pseudomonas.  She was discharged to the nursing home with wound care and levaquin.  Patient has worsening wounds and the nursing home sent her to [facility] ED for further evaluation and admission.  Prior to transfer from [facility], the discharging physician and the nephrologist and ID physician spoke to her about considering hospice/palliative care.  Patient was adamant about being a full code.  She has two children (12 yo and 12 yo).  She wants to raise her children.      She states that she is able to stand and walk some with assistance.  She does move her upper extremities and feeds herself but has generalized weakness of all extremities but says she is not familiar with the term transverse myelitis and is not sure why her extremities so  weak (3/5 of all four - able to lift gravity).  She has some atrophy noted but most of her leg problems may be from being bedridden with these wounds.  She has been on steroids previously for lupus flares but not chronically.       Overview/Hospital Course:  33 year old female with an extensive PMH presents with calciphylaxis and is ESRD.  Patient denies chest pain, shortness of breath, nausea, vomiting, or diarrhea.    04/27 Records reviewed. Seen in dialysis. Been on dialysis 2 yrs. Just DC from [facility] one month earlier after 52 day stay.  Wounds to legs and sacral area in last couple months. Concern calciphylaxis. Had debridement area x 2.   PMHx: SLE, ESRD TTS hemodialysis (access AV fistula left arm), GERD. Discuss with surgery. Continue current ATB. Increase activity when OK with surgery. River in secondary to wounds.  04/28 no new issues.  Continue current therapy.  Discussed with surgery    Interval History:     Review of Systems   Constitutional:  Negative for appetite change, fatigue and fever.   HENT:  Negative for congestion, hearing loss and trouble swallowing.    Respiratory:  Negative for chest tightness, shortness of breath and wheezing.    Cardiovascular:  Negative for chest pain and palpitations.   Gastrointestinal:  Negative for abdominal pain, constipation and nausea.   Genitourinary:  Negative for difficulty urinating and dysuria.   Musculoskeletal:  Negative for back pain and neck stiffness.   Skin:  Positive for wound. Negative for pallor and rash.        Leg and sacral wounds   Neurological:  Negative for dizziness, speech difficulty and headaches.   Psychiatric/Behavioral:  Negative for confusion and suicidal ideas.      Objective:     Vital Signs (Most Recent):  Temp: 98.4 °F (36.9 °C) (04/28/24 1129)  Pulse: 100 (04/28/24 1129)  Resp: 17 (04/28/24 1422)  BP: (!) 145/97 (04/28/24 1129)  SpO2: 96 % (04/28/24 1129) Vital Signs (24h Range):  Temp:  [97.5 °F (36.4 °C)-99.7 °F (37.6  °C)] 98.4 °F (36.9 °C)  Pulse:  [] 100  Resp:  [16-20] 17  SpO2:  [96 %-98 %] 96 %  BP: (128-145)/(81-97) 145/97     Weight: 67.6 kg (149 lb 1.6 oz)  Body mass index is 22.02 kg/m².    Intake/Output Summary (Last 24 hours) at 4/28/2024 1446  Last data filed at 4/28/2024 1243  Gross per 24 hour   Intake 720 ml   Output --   Net 720 ml         Physical Exam  Vitals reviewed.   Constitutional:       General: She is awake. She is not in acute distress.     Appearance: She is well-developed. She is not toxic-appearing.   HENT:      Head: Normocephalic.      Nose: Nose normal.      Mouth/Throat:      Pharynx: Oropharynx is clear.   Eyes:      Extraocular Movements: Extraocular movements intact.      Pupils: Pupils are equal, round, and reactive to light.   Neck:      Thyroid: No thyroid mass.      Vascular: No carotid bruit.   Cardiovascular:      Rate and Rhythm: Normal rate and regular rhythm.      Pulses: Normal pulses.      Heart sounds: Normal heart sounds. No murmur heard.  Pulmonary:      Effort: Pulmonary effort is normal.      Breath sounds: Normal breath sounds and air entry. No wheezing.   Abdominal:      General: Bowel sounds are normal. There is no distension.      Palpations: Abdomen is soft.      Tenderness: There is no abdominal tenderness.   Musculoskeletal:         General: Normal range of motion.      Cervical back: Neck supple. No rigidity.      Comments: AV Fistula left upper arm   Skin:     General: Skin is warm.      Coloration: Skin is not jaundiced.      Findings: Lesion present.   Neurological:      General: No focal deficit present.      Mental Status: She is alert and oriented to person, place, and time.      Cranial Nerves: No cranial nerve deficit.   Psychiatric:         Attention and Perception: Attention normal.         Mood and Affect: Mood normal.         Behavior: Behavior normal. Behavior is cooperative.         Thought Content: Thought content normal.         Cognition and  Memory: Cognition normal.             Significant Labs: All pertinent labs within the past 24 hours have been reviewed.  BMP:   Recent Labs   Lab 04/28/24  0236   GLU 93      K 4.3      CO2 28   BUN 26*   CREATININE 3.65*   CALCIUM 8.0*     CBC:   Recent Labs   Lab 04/27/24  0247 04/28/24  0236   WBC 8.85 7.20   HGB 8.7* 7.4*   HCT 29.1* 24.3*    247     CMP:   Recent Labs   Lab 04/27/24  0247 04/28/24  0236    139   K 5.0 4.3    104   CO2 27 28   GLU 83 93   BUN 38* 26*   CREATININE 4.82* 3.65*   CALCIUM 8.8 8.0*   ALBUMIN 1.6* 1.5*   ANIONGAP 10 11       Significant Imaging: I have reviewed all pertinent imaging results/findings within the past 24 hours.    Imaging Results              X-Ray Chest AP Portable (Final result)  Result time 04/18/24 07:40:06      Final result by Phillip Fu II, MD (04/18/24 07:40:06)                   Impression:      No evidence of acute cardiopulmonary disease.      Electronically signed by: Phillip Fu  Date:    04/18/2024  Time:    07:40               Narrative:    EXAMINATION:  XR CHEST AP PORTABLE    CLINICAL HISTORY:  Fever, unspecified    COMPARISON:  None available    TECHNIQUE:  XR CHEST AP PORTABLE    FINDINGS:  The heart and mediastinum are normal in size and configuration.  The pulmonary vascularity is normal in caliber.  No lung infiltrates, effusions, pneumothorax or other abnormality is demonstrated.                                    Intake/Output - Last 3 Shifts         04/26 0700 04/27 0659 04/27 0700 04/28 0659 04/28 0700 04/29 0659    P.O. 900 600 480    I.V. (mL/kg)  399.5 (5.9)     Other  750     IV Piggyback 199.9 949.8     Total Intake(mL/kg) 1099.9 (16.5) 2699.3 (39.9) 480 (7.1)    Urine (mL/kg/hr) 400 (0.3) 0 (0)     Other  1750     Stool  0     Total Output 400 1750     Net +699.9 +949.3 +480           Urine Occurrence 1 x 3 x 1 x    Stool Occurrence  1 x           Microbiology Results (last 7 days)        Procedure Component Value Units Date/Time    Culture, Wound [[id number]]  (Abnormal)  (Susceptibility) Collected: 04/23/24 1744    Order Status: Completed Specimen: Wound from Leg, Right Updated: 04/25/24 0745     Culture, Wound/Abscess Heavy Growth Pseudomonas aeruginosa     Comment: MDRO (Multiple drug resistant organism)  CRPA (Carbapenem-resistant Pseudomonas areuginosa)       Blood culture x two cultures. Draw prior to antibiotics. [[id number]] Collected: 04/17/24 2332    Order Status: Completed Specimen: Blood Updated: 04/23/24 0632     Culture, Blood No Growth at 5 days    Blood culture x two cultures. Draw prior to antibiotics. [[id number]] Collected: 04/17/24 2327    Order Status: Completed Specimen: Blood Updated: 04/23/24 0631     Culture, Blood No Growth at 5 days              Assessment/Plan:      * Calciphylaxis with nonhealing ulcer of leg  On Merrem for the Citrobacter; has Psudomonas and Citrobacter in wound cultures; negative blood cultures; followed by wound care; spoke to ID at [facility] who suggested Zerbaxa for the Pseudomonas; On sodium thiosulfate; having fevers and is tachycardic--BP on the low side to start meds; will monitor; appreciate Surgical consult for evaluation of wounds    04/27 discuss with surgery if these organism deep or superficial colonization     Tachycardia  Due to infection; BP is too low to give BP meds; will monitor      Fever  Due to infection; on Merrem and Zerbaxa; completed Vancomycin for UTI; will monitor    04/27 none current      Acute cystitis without hematuria  Completed Vancomycin for Staph epidermidis in urine culture but may be contamination    Acute blood loss anemia  S/P transfusion on dialysis; occult blood negative; s/p surgical intervention; Hgb ok    Sacral decubitus ulcer  On Merrem and Zerbaxa; followed by wound care; s/p debridement    04/27 discuss with surgery if deep tissue infection or if bacterium superifical isolates.  Cultures include pseudomonas  MDRO / CPPA    SLE (systemic lupus erythematosus)  No active disease noted at this time    ESRD (end stage renal disease) on dialysis  On HD TTS    Anemia in chronic kidney disease  S/p transfusion on dialysis; monitor Hgb; Hgb ok      VTE Risk Mitigation (From admission, onward)           Ordered     heparin (porcine) injection 5,000 Units  Every 12 hours         04/18/24 0428                    Discharge Planning   ESTHER: 4/25/2024     Code Status: Full Code   Is the patient medically ready for discharge?:     Reason for patient still in hospital (select all that apply): Laboratory test, Treatment, Imaging, and Consult recommendations  Discharge Plan A: Skilled Nursing Facility                  Stef Emanuel MD  Department of Hospital Medicine   [facility] - [address] [facility]

## 2024-04-28 NOTE — PROGRESS NOTES
[facility] - [address] [place] [address] Medicine  Progress Note    Patient Name: Madhav Correa  MRN: [id number]  Patient Class: IP- Inpatient   Admission Date: 4/17/2024  Length of Stay: 9 days  Attending Physician: Stef Emanuel MD  Primary Care Provider: Rehab, [facility]        Subjective:     Principal Problem:Calciphylaxis with nonhealing ulcer of leg        HPI:  32 yo F presents to [facility] ED from [facility] for worsening wounds.  Patient is severely debilitated due to SLE.  She was diagnosed five years ago and has suffered secondary hypertension and  ESRD and is now on HD TTS.  She has also developed severe calciphylaxis during her daily dialysis when she was hospitalized at [facility].  Patient lives in [place] and was transferred for wound care to [facility] because they were one of few nursing homes that would accept HD patients.      This patient has a tragic story and I have read the dc summary from 4/424 regarding the eight week course of thiosulfate and the calciphylaxis becoming worse.  She has developed severe wounds on her legs and buttocks (see photos).  She was on meropenum and levaquin in hospital for MSSA and pseudomonas.  She was discharged to the nursing home with wound care and levaquin.  Patient has worsening wounds and the nursing home sent her to [facility] ED for further evaluation and admission.  Prior to transfer from [facility], the discharging physician and the nephrologist and ID physician spoke to her about considering hospice/palliative care.  Patient was adamant about being a full code.  She has two children (12 yo and 10 yo).  She wants to raise her children.      She states that she is able to stand and walk some with assistance.  She does move her upper extremities and feeds herself but has generalized weakness of all extremities but says she is not familiar with the term transverse myelitis and is not sure why her extremities so  weak (3/5 of all four - able to lift gravity).  She has some atrophy noted but most of her leg problems may be from being bedridden with these wounds.  She has been on steroids previously for lupus flares but not chronically.       Overview/Hospital Course:  33 year old female with an extensive PMH presents with calciphylaxis and is ESRD.  Patient denies chest pain, shortness of breath, nausea, vomiting, or diarrhea.    04/27 Records reviewed. Seen in dialysis. Been on dialysis 2 yrs. Just DC from [facility] one month earlier after 52 day stay.  Wounds to legs and sacral area in last couple months. Concern calciphylaxis. Had debridement area x 2.   PMHx: SLE, ESRD TTS hemodialysis, GERD. Discuss with surgery. Continue current ATB. Increase activity when OK with surgery. River in secondary to wounds.    Interval History:     Review of Systems   Constitutional:  Negative for appetite change, fatigue and fever.   HENT:  Negative for congestion, hearing loss and trouble swallowing.    Respiratory:  Negative for chest tightness, shortness of breath and wheezing.    Cardiovascular:  Negative for chest pain and palpitations.   Gastrointestinal:  Negative for abdominal pain, constipation and nausea.   Genitourinary:  Negative for difficulty urinating and dysuria.   Musculoskeletal:  Negative for back pain and neck stiffness.   Skin:  Positive for wound. Negative for pallor and rash.        Leg and sacral wounds   Neurological:  Negative for dizziness, speech difficulty and headaches.   Psychiatric/Behavioral:  Negative for confusion and suicidal ideas.      Objective:     Vital Signs (Most Recent):  Temp: 98.8 °F (37.1 °C) (04/27/24 2337)  Pulse: 99 (04/27/24 2337)  Resp: 16 (04/27/24 2337)  BP: (!) 136/91 (04/27/24 2337)  SpO2: 96 % (04/27/24 2337) Vital Signs (24h Range):  Temp:  [97.5 °F (36.4 °C)-99.8 °F (37.7 °C)] 98.8 °F (37.1 °C)  Pulse:  [] 99  Resp:  [16-20] 16  SpO2:  [95 %-97 %] 96 %  BP:  (125-161)/() 136/91     Weight: 66.5 kg (146 lb 9.7 oz)  Body mass index is 21.65 kg/m².    Intake/Output Summary (Last 24 hours) at 4/27/2024 2348  Last data filed at 4/27/2024 1756  Gross per 24 hour   Intake 3149.31 ml   Output 1750 ml   Net 1399.31 ml         Physical Exam  Vitals reviewed.   Constitutional:       General: She is awake. She is not in acute distress.     Appearance: She is well-developed. She is not toxic-appearing.   HENT:      Head: Normocephalic.      Nose: Nose normal.      Mouth/Throat:      Pharynx: Oropharynx is clear.   Eyes:      Extraocular Movements: Extraocular movements intact.      Pupils: Pupils are equal, round, and reactive to light.   Neck:      Thyroid: No thyroid mass.      Vascular: No carotid bruit.   Cardiovascular:      Rate and Rhythm: Normal rate and regular rhythm.      Pulses: Normal pulses.      Heart sounds: Normal heart sounds. No murmur heard.  Pulmonary:      Effort: Pulmonary effort is normal.      Breath sounds: Normal breath sounds and air entry. No wheezing.   Abdominal:      General: Bowel sounds are normal. There is no distension.      Palpations: Abdomen is soft.      Tenderness: There is no abdominal tenderness.   Musculoskeletal:         General: Normal range of motion.      Cervical back: Neck supple. No rigidity.   Skin:     General: Skin is warm.      Coloration: Skin is not jaundiced.      Findings: Lesion present.   Neurological:      General: No focal deficit present.      Mental Status: She is alert and oriented to person, place, and time.      Cranial Nerves: No cranial nerve deficit.   Psychiatric:         Attention and Perception: Attention normal.         Mood and Affect: Mood normal.         Behavior: Behavior normal. Behavior is cooperative.         Thought Content: Thought content normal.         Cognition and Memory: Cognition normal.             Significant Labs: All pertinent labs within the past 24 hours have been reviewed.  BMP:    Recent Labs   Lab 04/27/24  0247   GLU 83      K 5.0      CO2 27   BUN 38*   CREATININE 4.82*   CALCIUM 8.8     CBC:   Recent Labs   Lab 04/26/24  0235 04/27/24  0247   WBC 9.17 8.85   HGB 7.7* 8.7*   HCT 25.3* 29.1*    264     CMP:   Recent Labs   Lab 04/26/24  0235 04/27/24  0247   * 137   K 4.4 5.0    105   CO2 27 27   GLU 95 83   BUN 27* 38*   CREATININE 3.87* 4.82*   CALCIUM 8.2* 8.8   ALBUMIN 1.6* 1.6*   ANIONGAP 9 10       Significant Imaging: I have reviewed all pertinent imaging results/findings within the past 24 hours.    Imaging Results              X-Ray Chest AP Portable (Final result)  Result time 04/18/24 07:40:06      Final result by Phillip Fu II, MD (04/18/24 07:40:06)                   Impression:      No evidence of acute cardiopulmonary disease.      Electronically signed by: Phillip Fu  Date:    04/18/2024  Time:    07:40               Narrative:    EXAMINATION:  XR CHEST AP PORTABLE    CLINICAL HISTORY:  Fever, unspecified    COMPARISON:  None available    TECHNIQUE:  XR CHEST AP PORTABLE    FINDINGS:  The heart and mediastinum are normal in size and configuration.  The pulmonary vascularity is normal in caliber.  No lung infiltrates, effusions, pneumothorax or other abnormality is demonstrated.                                    Intake/Output - Last 3 Shifts         04/26 0700  04/27 0659 04/27 0700  04/28 0659    P.O. 900 600    I.V. (mL/kg)  399.5 (6)    Other  750    IV Piggyback 199.9 949.8    Total Intake(mL/kg) 1099.9 (16.5) 2699.3 (40.6)    Urine (mL/kg/hr) 400 (0.3) 0 (0)    Other  1750    Stool  0    Total Output 400 1750    Net +699.9 +949.3          Urine Occurrence 1 x 3 x    Stool Occurrence  1 x          Microbiology Results (last 7 days)       Procedure Component Value Units Date/Time    Culture, Wound [[id number]]  (Abnormal)  (Susceptibility) Collected: 04/23/24 8964    Order Status: Completed Specimen: Wound from Leg, Right  Updated: 04/25/24 0745     Culture, Wound/Abscess Heavy Growth Pseudomonas aeruginosa     Comment: MDRO (Multiple drug resistant organism)  CRPA (Carbapenem-resistant Pseudomonas areuginosa)       Blood culture x two cultures. Draw prior to antibiotics. [[id number]] Collected: 04/17/24 2332    Order Status: Completed Specimen: Blood Updated: 04/23/24 0632     Culture, Blood No Growth at 5 days    Blood culture x two cultures. Draw prior to antibiotics. [[id number]] Collected: 04/17/24 2327    Order Status: Completed Specimen: Blood Updated: 04/23/24 0631     Culture, Blood No Growth at 5 days    Urine culture [[id number]]  (Abnormal)  (Susceptibility) Collected: 04/18/24 0406    Order Status: Completed Specimen: Urine, Clean Catch Updated: 04/21/24 0952     Culture, Urine >100,000 Staphylococcus epidermidis    Culture, Wound [[id number]]  (Abnormal)  (Susceptibility) Collected: 04/17/24 2330    Order Status: Completed Specimen: Wound from Leg, Left Updated: 04/21/24 0949     Culture, Wound/Abscess Moderate Growth Pseudomonas aeruginosa     Comment: MDRO (Multiple drug resistant organism)  CRPA (Carbapenem-resistant Pseudomonas areuginosa)         Moderate Growth Citrobacter werkmanii              Assessment/Plan:      * Calciphylaxis with nonhealing ulcer of leg  On Merrem for the Citrobacter; has Psudomonas and Citrobacter in wound cultures; negative blood cultures; followed by wound care; spoke to ID at [facility] who suggested Zerbaxa for the Pseudomonas; On sodium thiosulfate; having fevers and is tachycardic--BP on the low side to start meds; will monitor; appreciate Surgical consult for evaluation of wounds    04/27 discuss with surgery if these organism deep or superficial colonization     Tachycardia  Due to infection; BP is too low to give BP meds; will monitor      Fever  Due to infection; on Merrem and Zerbaxa; completed Vancomycin for UTI; will monitor    04/27 none current      Acute cystitis without  hematuria  Completed Vancomycin for Staph epidermidis in urine culture but may be contamination    Acute blood loss anemia  S/P transfusion on dialysis; occult blood negative; s/p surgical intervention; Hgb ok    Sacral decubitus ulcer  On Merrem and Zerbaxa; followed by wound care; s/p debridement    04/27 discuss with surgery if deep tissue infection or if bacterium superifical isolates.  Cultures include pseudomonas MDRO / CPPA    SLE (systemic lupus erythematosus)  No active disease noted at this time    ESRD (end stage renal disease) on dialysis  On HD TTS    Anemia in chronic kidney disease  S/p transfusion on dialysis; monitor Hgb; Hgb ok      VTE Risk Mitigation (From admission, onward)           Ordered     heparin (porcine) injection 5,000 Units  Every 12 hours         04/18/24 0428                    Discharge Planning   ESTHER: 4/25/2024     Code Status: Full Code   Is the patient medically ready for discharge?:     Reason for patient still in hospital (select all that apply): Laboratory test, Treatment, and Imaging  Discharge Plan A: Skilled Nursing Facility                  Stef Emanuel MD  Department of Hospital Medicine   [facility]

## 2024-04-28 NOTE — PROGRESS NOTES
[facility] - [facility] Medical Telemetry  Nephrology  Progress Note    Patient Name: Madhav Correa  MRN: [id number]  Admission Date: 4/17/2024  Hospital Length of Stay: 10 days  Attending Provider: Stef Emanuel MD   [facility]  Principal Problem:Calciphylaxis with nonhealing ulcer of leg    Consults  Subjective:     Interval History:  Ms. Correa is seen in follow-up of her end-stage renal disease.  She dialyzed yesterday and did well.  She says that the sores on her legs still hurt but she is not in pain at present.    Review of patient's allergies indicates:   Allergen Reactions    Iodine Swelling     Causes swelling, itching , and nausea.     Current Facility-Administered Medications   Medication Dose Route Frequency Provider Last Rate Last Admin    0.9%  NaCl infusion (for blood administration)   Intravenous Q24H PRN Ben Crawford MD        0.9%  NaCl infusion (for blood administration)   Intravenous Q24H PRN Sugar Garcia DO        0.9%  NaCl infusion   Intravenous PRN Elvis Ndiaye,  125 mL/hr at 04/27/24 1125 2,000 mL at 04/27/24 1125    acetaminophen tablet 1,000 mg  1,000 mg Oral Q6H PRN Alejandrina Duggan MD   1,000 mg at 04/26/24 0548    bisacodyL EC tablet 10 mg  10 mg Oral Daily PRN Alejandrina Duggan MD        bisacodyL suppository 10 mg  10 mg Rectal Daily PRN Luis Alfredo Valentin DO        buPROPion tablet 75 mg  75 mg Oral BID Alejandrina Duggan MD   75 mg at 04/28/24 0943    carvediloL tablet 25 mg  25 mg Oral BID  Alejandrina Duggan MD   25 mg at 04/28/24 0800    ceftolozane-tazobactam (ZERBAXA) 450 mg in dextrose 5 % (D5W) 100 mL  450 mg Intravenous Q8H Sugar Garcia DO   Stopped at 04/28/24 0619    dextromethorphan-guaiFENesin  mg/5 ml liquid 10 mL  10 mL Oral Q6H PRN Alejandrina Duggan MD        epoetin pawel injection 10,000 Units  10,000 Units Intravenous Every Teja Lockhart, Ben Bell MD    10,000 Units at 04/27/24 1126    famotidine tablet 20 mg  20 mg Oral Daily Alejandrina Duggan MD   20 mg at 04/28/24 0943    heparin (porcine) injection 5,000 Units  5,000 Units Subcutaneous Q12H Alejandrina Duggan MD   5,000 Units at 04/28/24 0943    HYDROmorphone (PF) injection 1 mg  1 mg Intravenous Q6H PRN Luis Alfredo Valentin DO   1 mg at 04/28/24 0808    isosorbide mononitrate 24 hr tablet 30 mg  30 mg Oral Daily Alejandrina Duggan MD   30 mg at 04/28/24 0943    lactobacillus acidophilus & bulgar 100 million cell packet 1 each  1 packet Oral BID Sugar Garcia DO   1 each at 04/28/24 0943    melatonin tablet 6 mg  6 mg Oral Nightly PRN Alejandrina Duggan MD        meropenem (MERREM) 500 mg in sodium chloride 0.9 % 100 mL IVPB (MB+)  500 mg Intravenous Q24H Luis Alfredo Valentin DO   Stopped at 04/27/24 1816    mupirocin 2 % ointment   Topical (Top) Daily Elvis Ndiaye DO   Given at 04/28/24 0952    OLANZapine tablet 5 mg  5 mg Oral QHS Alejandrina Duggan MD   5 mg at 04/27/24 2006    ondansetron injection 8 mg  8 mg Intravenous Q6H PRN Alejandrina Duggan MD        oxyCODONE immediate release tablet 15 mg  15 mg Oral Q6H PRN Alejandrina Duggan MD   15 mg at 04/28/24 0944    senna-docusate 8.6-50 mg per tablet 2 tablet  2 tablet Oral BID Alejandrina Duggan MD   2 tablet at 04/28/24 0943    sevelamer carbonate tablet 1,600 mg  1,600 mg Oral TIDWM Alejandrina Duggan MD   1,600 mg at 04/28/24 0756    simethicone chewable tablet 80 mg  1 tablet Oral TID PRN Alejandrina Duggan MD        sodium thiosulfate 25 g in sodium chloride 0.9% 250 mL IVPB  25 g Intravenous Every Tues, Thurs, Sat Ben Crawford MD   Stopped at 04/27/24 1228    traZODone tablet 50 mg  50 mg Oral Nightly PRN Alejandrina Duggan MD   50 mg at 04/24/24 2002       Objective:     Vital Signs (Most Recent):  Temp: 98.7 °F (37.1 °C) (04/28/24 0800)  Pulse: 96 (04/28/24 0800)  Resp: 19 (04/28/24 0944)  BP: (!) 143/91 (04/28/24  0800)  SpO2: 98 % (04/28/24 0800) Vital Signs (24h Range):  Temp:  [97.5 °F (36.4 °C)-99.7 °F (37.6 °C)] 98.7 °F (37.1 °C)  Pulse:  [] 96  Resp:  [16-20] 19  SpO2:  [96 %-98 %] 98 %  BP: (128-161)/() 143/91     Weight: 67.6 kg (149 lb 1.6 oz) (04/28/24 0621)  Body mass index is 22.02 kg/m².  Body surface area is 1.81 meters squared.    I/O last 3 completed shifts:  In: 3599.3 [P.O.:1500; I.V.:399.5; Other:750; IV Piggyback:949.8]  Out: 1750 [Other:1750]    Physical Exam she has a clear chest.  Blood pressure is 140/90.  Heart without rub or gallop.  She is occupying her time doing puzzles at present and is without complaints.  She is wearing boots that elevate her calves off the bed and is comfortable.    Significant Labs:sureBMP:   Recent Labs   Lab 04/28/24  0236   GLU 93      K 4.3      CO2 28   BUN 26*   CREATININE 3.65*   CALCIUM 8.0*     CBC:   Recent Labs   Lab 04/28/24  0236   WBC 7.20   RBC 2.70*   HGB 7.4*   HCT 24.3*      MCV 90.0   MCH 27.4   MCHC 30.5*     All labs within the past 24 hours have been reviewed.    Significant Imaging:  Labs: Reviewed    Assessment/Plan:     Active Diagnoses:    Diagnosis Date Noted POA    PRINCIPAL PROBLEM:  Calciphylaxis with nonhealing ulcer of leg [E83.59, L97.909]  Yes    Fever [R50.9] 04/26/2024 No    Tachycardia [R00.0] 04/26/2024 No    Acute cystitis without hematuria [N30.00] 04/22/2024 Yes    Acute blood loss anemia [D62] 04/19/2024 Yes    Sacral decubitus ulcer [L89.159] 04/18/2024 Yes    Anemia in chronic kidney disease [N18.9, D63.1]  Yes    ESRD (end stage renal disease) on dialysis [N18.6, Z99.2]  Not Applicable    SLE (systemic lupus erythematosus) [M32.9]  Yes      Problems Resolved During this Admission:    Diagnosis Date Noted Date Resolved POA    Pressure injury of skin with infection [L89.90, L08.9] 04/18/2024 04/22/2024 Yes    Sepsis [A41.9] 04/18/2024 04/22/2024 Yes    Pyuria [R82.81] 04/18/2024 04/22/2024 Yes     Secondary hyperparathyroidism [N25.81] 04/18/2024 04/22/2024 Yes    Asthma [J45.909]  04/22/2024 Yes    GERD (gastroesophageal reflux disease) [K21.9]  04/22/2024 Yes    Secondary hypertension [I15.9]  04/22/2024 Yes    Atypical anxiety disorder [F41.9]  04/22/2024 Yes    Severe protein-calorie malnutrition [E43]  04/22/2024 Yes       We will continue with her Tuesday Thursday Saturday dialysis schedule    Thank you for your consult. .    Hayden West MD  Nephrology  [facility] - [address] [place] [address]

## 2024-04-28 NOTE — PLAN OF CARE
Problem: Adult Inpatient Plan of Care  Goal: Plan of Care Review  Outcome: Progressing  Goal: Patient-Specific Goal (Individualized)  Outcome: Progressing  Goal: Absence of Hospital-Acquired Illness or Injury  Outcome: Progressing  Goal: Optimal Comfort and Wellbeing  Outcome: Progressing     Problem: Infection  Goal: Absence of Infection Signs and Symptoms  Outcome: Progressing     Problem: Wound  Goal: Optimal Coping  Outcome: Progressing

## 2024-04-28 NOTE — SUBJECTIVE & OBJECTIVE
Interval History:     Review of Systems   Constitutional:  Negative for appetite change, fatigue and fever.   HENT:  Negative for congestion, hearing loss and trouble swallowing.    Respiratory:  Negative for chest tightness, shortness of breath and wheezing.    Cardiovascular:  Negative for chest pain and palpitations.   Gastrointestinal:  Negative for abdominal pain, constipation and nausea.   Genitourinary:  Negative for difficulty urinating and dysuria.   Musculoskeletal:  Negative for back pain and neck stiffness.   Skin:  Positive for wound. Negative for pallor and rash.        Leg and sacral wounds   Neurological:  Negative for dizziness, speech difficulty and headaches.   Psychiatric/Behavioral:  Negative for confusion and suicidal ideas.      Objective:     Vital Signs (Most Recent):  Temp: 98.8 °F (37.1 °C) (04/27/24 2337)  Pulse: 99 (04/27/24 2337)  Resp: 16 (04/27/24 2337)  BP: (!) 136/91 (04/27/24 2337)  SpO2: 96 % (04/27/24 2337) Vital Signs (24h Range):  Temp:  [97.5 °F (36.4 °C)-99.8 °F (37.7 °C)] 98.8 °F (37.1 °C)  Pulse:  [] 99  Resp:  [16-20] 16  SpO2:  [95 %-97 %] 96 %  BP: (125-161)/() 136/91     Weight: 66.5 kg (146 lb 9.7 oz)  Body mass index is 21.65 kg/m².    Intake/Output Summary (Last 24 hours) at 4/27/2024 2348  Last data filed at 4/27/2024 1756  Gross per 24 hour   Intake 3149.31 ml   Output 1750 ml   Net 1399.31 ml         Physical Exam  Vitals reviewed.   Constitutional:       General: She is awake. She is not in acute distress.     Appearance: She is well-developed. She is not toxic-appearing.   HENT:      Head: Normocephalic.      Nose: Nose normal.      Mouth/Throat:      Pharynx: Oropharynx is clear.   Eyes:      Extraocular Movements: Extraocular movements intact.      Pupils: Pupils are equal, round, and reactive to light.   Neck:      Thyroid: No thyroid mass.      Vascular: No carotid bruit.   Cardiovascular:      Rate and Rhythm: Normal rate and regular rhythm.       Pulses: Normal pulses.      Heart sounds: Normal heart sounds. No murmur heard.  Pulmonary:      Effort: Pulmonary effort is normal.      Breath sounds: Normal breath sounds and air entry. No wheezing.   Abdominal:      General: Bowel sounds are normal. There is no distension.      Palpations: Abdomen is soft.      Tenderness: There is no abdominal tenderness.   Musculoskeletal:         General: Normal range of motion.      Cervical back: Neck supple. No rigidity.   Skin:     General: Skin is warm.      Coloration: Skin is not jaundiced.      Findings: Lesion present.   Neurological:      General: No focal deficit present.      Mental Status: She is alert and oriented to person, place, and time.      Cranial Nerves: No cranial nerve deficit.   Psychiatric:         Attention and Perception: Attention normal.         Mood and Affect: Mood normal.         Behavior: Behavior normal. Behavior is cooperative.         Thought Content: Thought content normal.         Cognition and Memory: Cognition normal.             Significant Labs: All pertinent labs within the past 24 hours have been reviewed.  BMP:   Recent Labs   Lab 04/27/24 0247   GLU 83      K 5.0      CO2 27   BUN 38*   CREATININE 4.82*   CALCIUM 8.8     CBC:   Recent Labs   Lab 04/26/24 0235 04/27/24 0247   WBC 9.17 8.85   HGB 7.7* 8.7*   HCT 25.3* 29.1*    264     CMP:   Recent Labs   Lab 04/26/24 0235 04/27/24 0247   * 137   K 4.4 5.0    105   CO2 27 27   GLU 95 83   BUN 27* 38*   CREATININE 3.87* 4.82*   CALCIUM 8.2* 8.8   ALBUMIN 1.6* 1.6*   ANIONGAP 9 10       Significant Imaging: I have reviewed all pertinent imaging results/findings within the past 24 hours.    Imaging Results              X-Ray Chest AP Portable (Final result)  Result time 04/18/24 07:40:06      Final result by Phillip Fu II, MD (04/18/24 07:40:06)                   Impression:      No evidence of acute cardiopulmonary  disease.      Electronically signed by: Phillip Fu  Date:    04/18/2024  Time:    07:40               Narrative:    EXAMINATION:  XR CHEST AP PORTABLE    CLINICAL HISTORY:  Fever, unspecified    COMPARISON:  None available    TECHNIQUE:  XR CHEST AP PORTABLE    FINDINGS:  The heart and mediastinum are normal in size and configuration.  The pulmonary vascularity is normal in caliber.  No lung infiltrates, effusions, pneumothorax or other abnormality is demonstrated.                                    Intake/Output - Last 3 Shifts         04/26 0700 04/27 0659 04/27 0700 04/28 0659    P.O. 900 600    I.V. (mL/kg)  399.5 (6)    Other  750    IV Piggyback 199.9 949.8    Total Intake(mL/kg) 1099.9 (16.5) 2699.3 (40.6)    Urine (mL/kg/hr) 400 (0.3) 0 (0)    Other  1750    Stool  0    Total Output 400 1750    Net +699.9 +949.3          Urine Occurrence 1 x 3 x    Stool Occurrence  1 x          Microbiology Results (last 7 days)       Procedure Component Value Units Date/Time    Culture, Wound [[id number]]  (Abnormal)  (Susceptibility) Collected: 04/23/24 1744    Order Status: Completed Specimen: Wound from Leg, Right Updated: 04/25/24 0745     Culture, Wound/Abscess Heavy Growth Pseudomonas aeruginosa     Comment: MDRO (Multiple drug resistant organism)  CRPA (Carbapenem-resistant Pseudomonas areuginosa)       Blood culture x two cultures. Draw prior to antibiotics. [[id number]] Collected: 04/17/24 2332    Order Status: Completed Specimen: Blood Updated: 04/23/24 0632     Culture, Blood No Growth at 5 days    Blood culture x two cultures. Draw prior to antibiotics. [[id number]] Collected: 04/17/24 2327    Order Status: Completed Specimen: Blood Updated: 04/23/24 0631     Culture, Blood No Growth at 5 days    Urine culture [[id number]]  (Abnormal)  (Susceptibility) Collected: 04/18/24 0406    Order Status: Completed Specimen: Urine, Clean Catch Updated: 04/21/24 0952     Culture, Urine >100,000 Staphylococcus  epidermidis    Culture, Wound [[id number]]  (Abnormal)  (Susceptibility) Collected: 04/17/24 3035    Order Status: Completed Specimen: Wound from Leg, Left Updated: 04/21/24 0902     Culture, Wound/Abscess Moderate Growth Pseudomonas aeruginosa     Comment: MDRO (Multiple drug resistant organism)  CRPA (Carbapenem-resistant Pseudomonas areuginosa)         Moderate Growth Citrobacter werkmanii

## 2024-04-29 LAB
ALBUMIN SERPL BCP-MCNC: 1.5 G/DL (ref 3.5–5)
ANION GAP SERPL CALCULATED.3IONS-SCNC: 16 MMOL/L (ref 7–16)
BASOPHILS # BLD AUTO: 0.03 K/UL (ref 0–0.2)
BASOPHILS NFR BLD AUTO: 0.5 % (ref 0–1)
BUN SERPL-MCNC: 39 MG/DL (ref 7–18)
BUN/CREAT SERPL: 9 (ref 6–20)
CALCIUM SERPL-MCNC: 8.5 MG/DL (ref 8.5–10.1)
CHLORIDE SERPL-SCNC: 103 MMOL/L (ref 98–107)
CO2 SERPL-SCNC: 23 MMOL/L (ref 21–32)
CREAT SERPL-MCNC: 4.58 MG/DL (ref 0.55–1.02)
DIFFERENTIAL METHOD BLD: ABNORMAL
EGFR (NO RACE VARIABLE) (RUSH/TITUS): 12 ML/MIN/1.73M2
EOSINOPHIL # BLD AUTO: 0.28 K/UL (ref 0–0.5)
EOSINOPHIL NFR BLD AUTO: 4.8 % (ref 1–4)
ERYTHROCYTE [DISTWIDTH] IN BLOOD BY AUTOMATED COUNT: 19.9 % (ref 11.5–14.5)
GLUCOSE SERPL-MCNC: 92 MG/DL (ref 74–106)
HCT VFR BLD AUTO: 24.3 % (ref 38–47)
HGB BLD-MCNC: 7.5 G/DL (ref 12–16)
IMM GRANULOCYTES # BLD AUTO: 0.12 K/UL (ref 0–0.04)
IMM GRANULOCYTES NFR BLD: 2 % (ref 0–0.4)
LYMPHOCYTES # BLD AUTO: 1.87 K/UL (ref 1–4.8)
LYMPHOCYTES NFR BLD AUTO: 31.8 % (ref 27–41)
MCH RBC QN AUTO: 27.7 PG (ref 27–31)
MCHC RBC AUTO-ENTMCNC: 30.9 G/DL (ref 32–36)
MCV RBC AUTO: 89.7 FL (ref 80–96)
MONOCYTES # BLD AUTO: 0.91 K/UL (ref 0–0.8)
MONOCYTES NFR BLD AUTO: 15.5 % (ref 2–6)
MPC BLD CALC-MCNC: 10.3 FL (ref 9.4–12.4)
NEUTROPHILS # BLD AUTO: 2.67 K/UL (ref 1.8–7.7)
NEUTROPHILS NFR BLD AUTO: 45.4 % (ref 53–65)
NRBC # BLD AUTO: 0 X10E3/UL
NRBC, AUTO (.00): 0 %
OHS QRS DURATION: 88 MS
OHS QRS DURATION: 92 MS
OHS QTC CALCULATION: 461 MS
OHS QTC CALCULATION: 527 MS
PHOSPHATE SERPL-MCNC: 3.4 MG/DL (ref 2.5–4.5)
PLATELET # BLD AUTO: 242 K/UL (ref 150–400)
POTASSIUM SERPL-SCNC: 4.6 MMOL/L (ref 3.5–5.1)
RBC # BLD AUTO: 2.71 M/UL (ref 4.2–5.4)
SODIUM SERPL-SCNC: 137 MMOL/L (ref 136–145)
WBC # BLD AUTO: 5.88 K/UL (ref 4.5–11)

## 2024-04-29 PROCEDURE — 99232 SBSQ HOSP IP/OBS MODERATE 35: CPT | Mod: ,,, | Performed by: HOSPITALIST

## 2024-04-29 PROCEDURE — 85025 COMPLETE CBC W/AUTO DIFF WBC: CPT | Performed by: STUDENT IN AN ORGANIZED HEALTH CARE EDUCATION/TRAINING PROGRAM

## 2024-04-29 PROCEDURE — 11000001 HC ACUTE MED/SURG PRIVATE ROOM

## 2024-04-29 PROCEDURE — 80069 RENAL FUNCTION PANEL: CPT | Performed by: STUDENT IN AN ORGANIZED HEALTH CARE EDUCATION/TRAINING PROGRAM

## 2024-04-29 PROCEDURE — 63600175 PHARM REV CODE 636 W HCPCS: Performed by: HOSPITALIST

## 2024-04-29 PROCEDURE — 63600175 PHARM REV CODE 636 W HCPCS: Mod: JZ,JG | Performed by: HOSPITALIST

## 2024-04-29 PROCEDURE — 25000003 PHARM REV CODE 250: Performed by: HOSPITALIST

## 2024-04-29 PROCEDURE — 63600175 PHARM REV CODE 636 W HCPCS: Performed by: FAMILY MEDICINE

## 2024-04-29 PROCEDURE — 36415 COLL VENOUS BLD VENIPUNCTURE: CPT | Performed by: STUDENT IN AN ORGANIZED HEALTH CARE EDUCATION/TRAINING PROGRAM

## 2024-04-29 RX ADMIN — OXYCODONE 15 MG: 5 TABLET ORAL at 05:04

## 2024-04-29 RX ADMIN — BUPROPION HYDROCHLORIDE 75 MG: 75 TABLET, FILM COATED ORAL at 08:04

## 2024-04-29 RX ADMIN — MUPIROCIN: 20 OINTMENT TOPICAL at 08:04

## 2024-04-29 RX ADMIN — HEPARIN SODIUM 5000 UNITS: 5000 INJECTION, SOLUTION INTRAVENOUS; SUBCUTANEOUS at 09:04

## 2024-04-29 RX ADMIN — HYDROMORPHONE HYDROCHLORIDE 1 MG: 2 INJECTION, SOLUTION INTRAMUSCULAR; INTRAVENOUS; SUBCUTANEOUS at 02:04

## 2024-04-29 RX ADMIN — HYDROMORPHONE HYDROCHLORIDE 1 MG: 2 INJECTION, SOLUTION INTRAMUSCULAR; INTRAVENOUS; SUBCUTANEOUS at 09:04

## 2024-04-29 RX ADMIN — OXYCODONE 15 MG: 5 TABLET ORAL at 11:04

## 2024-04-29 RX ADMIN — ISOSORBIDE MONONITRATE 30 MG: 30 TABLET, EXTENDED RELEASE ORAL at 08:04

## 2024-04-29 RX ADMIN — SENNOSIDES AND DOCUSATE SODIUM 2 TABLET: 8.6; 5 TABLET ORAL at 09:04

## 2024-04-29 RX ADMIN — CEFTOLOZANE AND TAZOBACTAM 450 MG: 1; .5 INJECTION, POWDER, LYOPHILIZED, FOR SOLUTION INTRAVENOUS at 04:04

## 2024-04-29 RX ADMIN — HEPARIN SODIUM 5000 UNITS: 5000 INJECTION, SOLUTION INTRAVENOUS; SUBCUTANEOUS at 08:04

## 2024-04-29 RX ADMIN — CARVEDILOL 25 MG: 25 TABLET, FILM COATED ORAL at 05:04

## 2024-04-29 RX ADMIN — OLANZAPINE 5 MG: 5 TABLET, FILM COATED ORAL at 09:04

## 2024-04-29 RX ADMIN — SENNOSIDES AND DOCUSATE SODIUM 2 TABLET: 8.6; 5 TABLET ORAL at 08:04

## 2024-04-29 RX ADMIN — HYDROMORPHONE HYDROCHLORIDE 1 MG: 2 INJECTION, SOLUTION INTRAMUSCULAR; INTRAVENOUS; SUBCUTANEOUS at 08:04

## 2024-04-29 RX ADMIN — Medication 1 EACH: at 09:04

## 2024-04-29 RX ADMIN — SEVELAMER CARBONATE 1600 MG: 800 TABLET, FILM COATED ORAL at 11:04

## 2024-04-29 RX ADMIN — CEFTOLOZANE AND TAZOBACTAM 450 MG: 1; .5 INJECTION, POWDER, LYOPHILIZED, FOR SOLUTION INTRAVENOUS at 01:04

## 2024-04-29 RX ADMIN — BUPROPION HYDROCHLORIDE 75 MG: 75 TABLET, FILM COATED ORAL at 09:04

## 2024-04-29 RX ADMIN — Medication 1 EACH: at 08:04

## 2024-04-29 RX ADMIN — SEVELAMER CARBONATE 1600 MG: 800 TABLET, FILM COATED ORAL at 05:04

## 2024-04-29 RX ADMIN — HYDROMORPHONE HYDROCHLORIDE 1 MG: 2 INJECTION, SOLUTION INTRAMUSCULAR; INTRAVENOUS; SUBCUTANEOUS at 01:04

## 2024-04-29 RX ADMIN — CARVEDILOL 25 MG: 25 TABLET, FILM COATED ORAL at 08:04

## 2024-04-29 RX ADMIN — SEVELAMER CARBONATE 1600 MG: 800 TABLET, FILM COATED ORAL at 08:04

## 2024-04-29 RX ADMIN — FAMOTIDINE 20 MG: 20 TABLET ORAL at 08:04

## 2024-04-29 NOTE — PROGRESS NOTES
Patient denies shortness of breath. Review of systems, G.I. she denies nausea or vomiting.  Musculoskeletal-patient states her pain in her legs is trying to get a little bit better.    Physical exam general patient in no acute distress.    Vitals:    04/29/24 0545 04/29/24 0750 04/29/24 0818 04/29/24 1147   BP:  (!) 143/98     Pulse:  90     Resp: 18 18 18 18   Temp:  98.4 °F (36.9 °C)     TempSrc:  Oral     SpO2:  98%     Weight:       Height:          Assessment/plan 1. ESRD continue dialysis support.  Two. Calciphylaxis - continue wound care and sodium thiosulfate  Three. Anemia  Four. Lupus.

## 2024-04-29 NOTE — NURSING
Transport request placed with [facility] for Transport on 04/30/2024 to [facility] , [place]

## 2024-04-29 NOTE — PLAN OF CARE
Problem: Skin Injury Risk Increased  Goal: Skin Health and Integrity  4/29/2024 0941 by Irene Minaya RN  Outcome: Progressing  4/29/2024 0940 by Irene Minaya RN  Outcome: Progressing     Problem: Adult Inpatient Plan of Care  Goal: Plan of Care Review  4/29/2024 0941 by Irene Minaya RN  Outcome: Progressing  4/29/2024 0940 by Ireen Minaya RN  Outcome: Progressing  Goal: Patient-Specific Goal (Individualized)  4/29/2024 0941 by Irene Minaya RN  Outcome: Progressing  4/29/2024 0940 by Irene Minaya RN  Outcome: Progressing  Goal: Absence of Hospital-Acquired Illness or Injury  4/29/2024 0941 by Irene Minaya RN  Outcome: Progressing  4/29/2024 0940 by Irene Minaya RN  Outcome: Progressing  Goal: Optimal Comfort and Wellbeing  4/29/2024 0941 by Irene Minaya RN  Outcome: Progressing  4/29/2024 0940 by Irene Minaya RN  Outcome: Progressing  Goal: Readiness for Transition of Care  4/29/2024 0941 by Irene Minaya RN  Outcome: Progressing  4/29/2024 0940 by Irene Minaya RN  Outcome: Progressing     Problem: Infection  Goal: Absence of Infection Signs and Symptoms  4/29/2024 0941 by Irene Minaya RN  Outcome: Progressing  4/29/2024 0940 by Irene Minaya RN  Outcome: Progressing     Problem: Adjustment to Illness (Sepsis/Septic Shock)  Goal: Optimal Coping  4/29/2024 0941 by Irene Minaya RN  Outcome: Progressing  4/29/2024 0940 by Irene Minaya RN  Outcome: Progressing     Problem: Bleeding (Sepsis/Septic Shock)  Goal: Absence of Bleeding  4/29/2024 0941 by Irene Minaya RN  Outcome: Progressing  4/29/2024 0940 by Irene Minaya RN  Outcome: Progressing     Problem: Glycemic Control Impaired (Sepsis/Septic Shock)  Goal: Blood Glucose Level Within Desired Range  4/29/2024 0941 by Irene Minaya RN  Outcome: Progressing  4/29/2024 0940 by Irene Minaya RN  Outcome: Progressing     Problem: Infection Progression (Sepsis/Septic Shock)  Goal: Absence of Infection Signs and  Symptoms  4/29/2024 0941 by Irene Minaya RN  Outcome: Progressing  4/29/2024 0940 by Irene Minaya RN  Outcome: Progressing     Problem: Nutrition Impaired (Sepsis/Septic Shock)  Goal: Optimal Nutrition Intake  4/29/2024 0941 by Irene Minaya RN  Outcome: Progressing  4/29/2024 0940 by Irene Minaya RN  Outcome: Progressing     Problem: Device-Related Complication Risk (Hemodialysis)  Goal: Safe, Effective Therapy Delivery  4/29/2024 0941 by Irene Minaya RN  Outcome: Progressing  4/29/2024 0940 by Irene Minaya RN  Outcome: Progressing     Problem: Hemodynamic Instability (Hemodialysis)  Goal: Effective Tissue Perfusion  4/29/2024 0941 by Irene Minaya RN  Outcome: Progressing  4/29/2024 0940 by Irene Minaya RN  Outcome: Progressing     Problem: Infection (Hemodialysis)  Goal: Absence of Infection Signs and Symptoms  4/29/2024 0941 by Irene Minaya RN  Outcome: Progressing  4/29/2024 0940 by Irene Minaya RN  Outcome: Progressing     Problem: Fall Injury Risk  Goal: Absence of Fall and Fall-Related Injury  4/29/2024 0941 by Irene Minaya RN  Outcome: Progressing  4/29/2024 0940 by Irene Minaay RN  Outcome: Progressing     Problem: Wound  Goal: Optimal Coping  4/29/2024 0941 by Irene Minaya RN  Outcome: Progressing  4/29/2024 0940 by Irene Minaya RN  Outcome: Progressing  Goal: Optimal Functional Ability  4/29/2024 0941 by Irene Minaya RN  Outcome: Progressing  4/29/2024 0940 by Irene Minaya RN  Outcome: Progressing  Goal: Absence of Infection Signs and Symptoms  4/29/2024 0941 by Irene Minaya RN  Outcome: Progressing  4/29/2024 0940 by Irene Minaya RN  Outcome: Progressing  Goal: Improved Oral Intake  4/29/2024 0941 by Irene Minaya RN  Outcome: Progressing  4/29/2024 0940 by Irene Minaya RN  Outcome: Progressing  Goal: Optimal Pain Control and Function  4/29/2024 0941 by Vercher, Irene, RN  Outcome: Progressing  4/29/2024 0940 by Irene Minaya RN  Outcome:  Progressing  Goal: Skin Health and Integrity  4/29/2024 0941 by Irene Minaya, RN  Outcome: Progressing  4/29/2024 0940 by Irene Minaya RN  Outcome: Progressing  Goal: Optimal Wound Healing  4/29/2024 0941 by Irene Minaya, RN  Outcome: Progressing  4/29/2024 0940 by Irene Minaya, RN  Outcome: Progressing

## 2024-04-29 NOTE — PLAN OF CARE
Chart reviewed. SS spoke with Dr. Emanuel, pt to dc back to [facility] tomorrow 4/30. Per Maryann at [facility], cut off of 2pm. Maryann stated facility is not able to transport pt back to facility tomorrow and will need [facility]. Ethan from dialysis states pt can be picked up first thing in the am to complete with enough time for dc by 2pm. Pt will need [facility] transport. SS informed Dr. Emanuel and JORGE Bonilla of cut-off time and need for early metro request. SS following for additional needs.

## 2024-04-30 VITALS
RESPIRATION RATE: 18 BRPM | DIASTOLIC BLOOD PRESSURE: 95 MMHG | OXYGEN SATURATION: 97 % | HEIGHT: 69 IN | TEMPERATURE: 99 F | BODY MASS INDEX: 22.09 KG/M2 | WEIGHT: 149.13 LBS | HEART RATE: 85 BPM | SYSTOLIC BLOOD PRESSURE: 155 MMHG

## 2024-04-30 PROBLEM — N30.00 ACUTE CYSTITIS WITHOUT HEMATURIA: Status: RESOLVED | Noted: 2024-04-22 | Resolved: 2024-04-30

## 2024-04-30 PROBLEM — D62 ACUTE BLOOD LOSS ANEMIA: Status: RESOLVED | Noted: 2024-04-19 | Resolved: 2024-04-30

## 2024-04-30 PROBLEM — R00.0 TACHYCARDIA: Status: RESOLVED | Noted: 2024-04-26 | Resolved: 2024-04-30

## 2024-04-30 PROBLEM — R50.9 FEVER: Status: RESOLVED | Noted: 2024-04-26 | Resolved: 2024-04-30

## 2024-04-30 LAB
ALBUMIN SERPL BCP-MCNC: 1.5 G/DL (ref 3.5–5)
ANION GAP SERPL CALCULATED.3IONS-SCNC: 15 MMOL/L (ref 7–16)
BASOPHILS # BLD AUTO: 0.03 K/UL (ref 0–0.2)
BASOPHILS NFR BLD AUTO: 0.5 % (ref 0–1)
BUN SERPL-MCNC: 49 MG/DL (ref 7–18)
BUN/CREAT SERPL: 9 (ref 6–20)
CALCIUM SERPL-MCNC: 8.9 MG/DL (ref 8.5–10.1)
CHLORIDE SERPL-SCNC: 103 MMOL/L (ref 98–107)
CO2 SERPL-SCNC: 22 MMOL/L (ref 21–32)
CREAT SERPL-MCNC: 5.48 MG/DL (ref 0.55–1.02)
DIFFERENTIAL METHOD BLD: ABNORMAL
EGFR (NO RACE VARIABLE) (RUSH/TITUS): 10 ML/MIN/1.73M2
EOSINOPHIL # BLD AUTO: 0.28 K/UL (ref 0–0.5)
EOSINOPHIL NFR BLD AUTO: 4.3 % (ref 1–4)
ERYTHROCYTE [DISTWIDTH] IN BLOOD BY AUTOMATED COUNT: 19.8 % (ref 11.5–14.5)
GLUCOSE SERPL-MCNC: 94 MG/DL (ref 74–106)
HCT VFR BLD AUTO: 26.3 % (ref 38–47)
HGB BLD-MCNC: 8.1 G/DL (ref 12–16)
IMM GRANULOCYTES # BLD AUTO: 0.11 K/UL (ref 0–0.04)
IMM GRANULOCYTES NFR BLD: 1.7 % (ref 0–0.4)
LYMPHOCYTES # BLD AUTO: 2.05 K/UL (ref 1–4.8)
LYMPHOCYTES NFR BLD AUTO: 31.8 % (ref 27–41)
MCH RBC QN AUTO: 27.6 PG (ref 27–31)
MCHC RBC AUTO-ENTMCNC: 30.8 G/DL (ref 32–36)
MCV RBC AUTO: 89.8 FL (ref 80–96)
MONOCYTES # BLD AUTO: 0.79 K/UL (ref 0–0.8)
MONOCYTES NFR BLD AUTO: 12.3 % (ref 2–6)
MPC BLD CALC-MCNC: 9.6 FL (ref 9.4–12.4)
NEUTROPHILS # BLD AUTO: 3.18 K/UL (ref 1.8–7.7)
NEUTROPHILS NFR BLD AUTO: 49.4 % (ref 53–65)
NRBC # BLD AUTO: 0 X10E3/UL
NRBC, AUTO (.00): 0 %
PHOSPHATE SERPL-MCNC: 4.1 MG/DL (ref 2.5–4.5)
PLATELET # BLD AUTO: 248 K/UL (ref 150–400)
POTASSIUM SERPL-SCNC: 5.1 MMOL/L (ref 3.5–5.1)
RBC # BLD AUTO: 2.93 M/UL (ref 4.2–5.4)
SODIUM SERPL-SCNC: 135 MMOL/L (ref 136–145)
WBC # BLD AUTO: 6.44 K/UL (ref 4.5–11)

## 2024-04-30 PROCEDURE — 99239 HOSP IP/OBS DSCHRG MGMT >30: CPT | Mod: ,,, | Performed by: HOSPITALIST

## 2024-04-30 PROCEDURE — 80069 RENAL FUNCTION PANEL: CPT | Performed by: STUDENT IN AN ORGANIZED HEALTH CARE EDUCATION/TRAINING PROGRAM

## 2024-04-30 PROCEDURE — 25000003 PHARM REV CODE 250: Performed by: HOSPITALIST

## 2024-04-30 PROCEDURE — 63600175 PHARM REV CODE 636 W HCPCS: Performed by: FAMILY MEDICINE

## 2024-04-30 PROCEDURE — 25000003 PHARM REV CODE 250: Performed by: INTERNAL MEDICINE

## 2024-04-30 PROCEDURE — 63600175 PHARM REV CODE 636 W HCPCS: Performed by: HOSPITALIST

## 2024-04-30 PROCEDURE — 25000003 PHARM REV CODE 250: Performed by: STUDENT IN AN ORGANIZED HEALTH CARE EDUCATION/TRAINING PROGRAM

## 2024-04-30 PROCEDURE — 36415 COLL VENOUS BLD VENIPUNCTURE: CPT | Performed by: STUDENT IN AN ORGANIZED HEALTH CARE EDUCATION/TRAINING PROGRAM

## 2024-04-30 PROCEDURE — 90935 HEMODIALYSIS ONE EVALUATION: CPT

## 2024-04-30 PROCEDURE — 63600175 PHARM REV CODE 636 W HCPCS: Mod: TB | Performed by: INTERNAL MEDICINE

## 2024-04-30 PROCEDURE — 85025 COMPLETE CBC W/AUTO DIFF WBC: CPT | Performed by: STUDENT IN AN ORGANIZED HEALTH CARE EDUCATION/TRAINING PROGRAM

## 2024-04-30 RX ADMIN — HEPARIN SODIUM 5000 UNITS: 5000 INJECTION, SOLUTION INTRAVENOUS; SUBCUTANEOUS at 08:04

## 2024-04-30 RX ADMIN — ISOSORBIDE MONONITRATE 30 MG: 30 TABLET, EXTENDED RELEASE ORAL at 08:04

## 2024-04-30 RX ADMIN — SODIUM CHLORIDE: 9 INJECTION, SOLUTION INTRAVENOUS at 09:04

## 2024-04-30 RX ADMIN — BUPROPION HYDROCHLORIDE 75 MG: 75 TABLET, FILM COATED ORAL at 08:04

## 2024-04-30 RX ADMIN — SEVELAMER CARBONATE 1600 MG: 800 TABLET, FILM COATED ORAL at 08:04

## 2024-04-30 RX ADMIN — SENNOSIDES AND DOCUSATE SODIUM 2 TABLET: 8.6; 5 TABLET ORAL at 08:04

## 2024-04-30 RX ADMIN — HYDROMORPHONE HYDROCHLORIDE 1 MG: 2 INJECTION, SOLUTION INTRAMUSCULAR; INTRAVENOUS; SUBCUTANEOUS at 02:04

## 2024-04-30 RX ADMIN — FAMOTIDINE 20 MG: 20 TABLET ORAL at 08:04

## 2024-04-30 RX ADMIN — OXYCODONE 15 MG: 5 TABLET ORAL at 12:04

## 2024-04-30 RX ADMIN — EPOETIN ALFA 10000 UNITS: 10000 SOLUTION INTRAVENOUS; SUBCUTANEOUS at 09:04

## 2024-04-30 RX ADMIN — HYDROMORPHONE HYDROCHLORIDE 1 MG: 2 INJECTION, SOLUTION INTRAMUSCULAR; INTRAVENOUS; SUBCUTANEOUS at 04:04

## 2024-04-30 RX ADMIN — OXYCODONE 15 MG: 5 TABLET ORAL at 03:04

## 2024-04-30 RX ADMIN — SEVELAMER CARBONATE 1600 MG: 800 TABLET, FILM COATED ORAL at 12:04

## 2024-04-30 RX ADMIN — Medication 1 EACH: at 08:04

## 2024-04-30 RX ADMIN — MUPIROCIN: 20 OINTMENT TOPICAL at 08:04

## 2024-04-30 RX ADMIN — CARVEDILOL 25 MG: 25 TABLET, FILM COATED ORAL at 08:04

## 2024-04-30 RX ADMIN — SODIUM THIOSULFATE 25 G: 250 INJECTION, SOLUTION INTRAVENOUS at 09:04

## 2024-04-30 NOTE — SUBJECTIVE & OBJECTIVE
Interval History:     Review of Systems   Constitutional:  Negative for appetite change, fatigue and fever.   HENT:  Negative for congestion, hearing loss and trouble swallowing.    Respiratory:  Negative for chest tightness, shortness of breath and wheezing.    Cardiovascular:  Negative for chest pain and palpitations.   Gastrointestinal:  Negative for abdominal pain, constipation and nausea.   Genitourinary:  Negative for difficulty urinating and dysuria.   Musculoskeletal:  Negative for back pain and neck stiffness.   Skin:  Positive for wound. Negative for pallor and rash.        Leg and sacral wounds   Neurological:  Negative for dizziness, speech difficulty and headaches.   Psychiatric/Behavioral:  Negative for confusion and suicidal ideas.      Objective:     Vital Signs (Most Recent):  Temp: 98.4 °F (36.9 °C) (04/29/24 1952)  Pulse: 94 (04/29/24 1952)  Resp: 18 (04/29/24 2118)  BP: 137/87 (04/29/24 1952)  SpO2: 97 % (04/29/24 1952) Vital Signs (24h Range):  Temp:  [97.8 °F (36.6 °C)-98.4 °F (36.9 °C)] 98.4 °F (36.9 °C)  Pulse:  [88-94] 94  Resp:  [16-22] 18  SpO2:  [95 %-98 %] 97 %  BP: (137-143)/(80-98) 137/87     Weight: 67.6 kg (149 lb 1.6 oz)  Body mass index is 22.02 kg/m².    Intake/Output Summary (Last 24 hours) at 4/29/2024 2227  Last data filed at 4/29/2024 0603  Gross per 24 hour   Intake 720 ml   Output 200 ml   Net 520 ml         Physical Exam  Vitals reviewed.   Constitutional:       General: She is awake. She is not in acute distress.     Appearance: She is well-developed. She is not toxic-appearing.   HENT:      Head: Normocephalic.      Nose: Nose normal.      Mouth/Throat:      Pharynx: Oropharynx is clear.   Eyes:      Extraocular Movements: Extraocular movements intact.      Pupils: Pupils are equal, round, and reactive to light.   Neck:      Thyroid: No thyroid mass.      Vascular: No carotid bruit.   Cardiovascular:      Rate and Rhythm: Normal rate and regular rhythm.      Pulses:  Normal pulses.      Heart sounds: Normal heart sounds. No murmur heard.  Pulmonary:      Effort: Pulmonary effort is normal.      Breath sounds: Normal breath sounds and air entry. No wheezing.   Abdominal:      General: Bowel sounds are normal. There is no distension.      Palpations: Abdomen is soft.      Tenderness: There is no abdominal tenderness.   Musculoskeletal:         General: Normal range of motion.      Cervical back: Neck supple. No rigidity.      Comments: AV Fistula left upper arm   Skin:     General: Skin is warm.      Coloration: Skin is not jaundiced.      Findings: Lesion present.   Neurological:      General: No focal deficit present.      Mental Status: She is alert and oriented to person, place, and time.      Cranial Nerves: No cranial nerve deficit.   Psychiatric:         Attention and Perception: Attention normal.         Mood and Affect: Mood normal.         Behavior: Behavior normal. Behavior is cooperative.         Thought Content: Thought content normal.         Cognition and Memory: Cognition normal.             Significant Labs: All pertinent labs within the past 24 hours have been reviewed.  BMP:   Recent Labs   Lab 04/29/24  0303   GLU 92      K 4.6      CO2 23   BUN 39*   CREATININE 4.58*   CALCIUM 8.5     CBC:   Recent Labs   Lab 04/28/24  0236 04/29/24  0303   WBC 7.20 5.88   HGB 7.4* 7.5*   HCT 24.3* 24.3*    242     CMP:   Recent Labs   Lab 04/28/24  0236 04/29/24  0303    137   K 4.3 4.6    103   CO2 28 23   GLU 93 92   BUN 26* 39*   CREATININE 3.65* 4.58*   CALCIUM 8.0* 8.5   ALBUMIN 1.5* 1.5*   ANIONGAP 11 16       Significant Imaging: I have reviewed all pertinent imaging results/findings within the past 24 hours.    Imaging Results              X-Ray Chest AP Portable (Final result)  Result time 04/18/24 07:40:06      Final result by Phillip Fu II, MD (04/18/24 07:40:06)                   Impression:      No evidence of acute  cardiopulmonary disease.      Electronically signed by: Phillip Fu  Date:    04/18/2024  Time:    07:40               Narrative:    EXAMINATION:  XR CHEST AP PORTABLE    CLINICAL HISTORY:  Fever, unspecified    COMPARISON:  None available    TECHNIQUE:  XR CHEST AP PORTABLE    FINDINGS:  The heart and mediastinum are normal in size and configuration.  The pulmonary vascularity is normal in caliber.  No lung infiltrates, effusions, pneumothorax or other abnormality is demonstrated.                                    Intake/Output - Last 3 Shifts         04/28 0700 04/29 0659 04/29 0700 04/30 0659    P.O. 1440     I.V. (mL/kg)      Other      IV Piggyback      Total Intake(mL/kg) 1440 (21.3)     Urine (mL/kg/hr) 200 (0.1)     Emesis/NG output 0     Other      Stool 0     Total Output 200     Net +1240           Urine Occurrence 1 x     Stool Occurrence  1 x          Microbiology Results (last 7 days)       Procedure Component Value Units Date/Time    Culture, Wound [[id number]]  (Abnormal)  (Susceptibility) Collected: 04/23/24 1744    Order Status: Completed Specimen: Wound from Leg, Right Updated: 04/25/24 0745     Culture, Wound/Abscess Heavy Growth Pseudomonas aeruginosa     Comment: MDRO (Multiple drug resistant organism)  CRPA (Carbapenem-resistant Pseudomonas areuginosa)       Blood culture x two cultures. Draw prior to antibiotics. [[id number]] Collected: 04/17/24 2332    Order Status: Completed Specimen: Blood Updated: 04/23/24 0632     Culture, Blood No Growth at 5 days    Blood culture x two cultures. Draw prior to antibiotics. [[id number]] Collected: 04/17/24 2327    Order Status: Completed Specimen: Blood Updated: 04/23/24 0631     Culture, Blood No Growth at 5 days

## 2024-04-30 NOTE — DIALYSIS ROUNDING
Ms. Correa is seen during her hemodialysis and she's doing well today. BP is stable. She's resting during treatment. We'll continue with her scheduled treatments.

## 2024-04-30 NOTE — PLAN OF CARE
[facility]  Discharge Final Note    Primary Care Provider: Rehab, [facility]    Expected Discharge Date: 4/30/2024    Final Discharge Note (most recent)       Final Note - 04/30/24 1319          Final Note    Assessment Type Final Discharge Note     Anticipated Discharge Disposition Skilled Nursing Facility     What phone number can be called within the next 1-3 days to see how you are doing after discharge? [phone]        Post-Acute Status    Post-Acute Authorization Placement     Post-Acute Placement Status Set-up Complete/Auth obtained     Coverage Medicare     Patient choice form signed by patient/caregiver List with quality metrics by geographic area provided;List from CMS Compare;List from System Post-Acute Care     Discharge Delays None known at this time                     Important Message from Medicare  Important Message from Medicare regarding Discharge Appeal Rights: Given to patient/caregiver, Explained to patient/caregiver, Signed/date by patient/caregiver     Date IMM was signed: 04/30/24  Time IMM was signed: 1320     Follow-up providers       [facility]   Specialty: Wound Care    [address] [place] [address] [place] [address]   Phone: [phone]       Next Steps: Schedule an appointment as soon as possible for a visit in 1 week(s)              After-discharge care                Destination       [facility]   Service: Skilled Nursing    [address] [place] [address]   Phone: [phone]                                 SS spoke with Maryann at [facility] to confirm that her dialysis is scheduled to resume and that her clinic has been notified of her dc. SS informed Maryann that DC orders have been faxed. IM updated. Pt discharged to [facility]. No other needs at this time

## 2024-04-30 NOTE — PLAN OF CARE
SS brought pt's packet for [place] nursing to pt's [facility]. SS notified JORGE Bonilla that packet is in [facility] but will need DC summary/orders and AVS prior to transfer. Per mindy Bonilla transportation request already complete.

## 2024-04-30 NOTE — ASSESSMENT & PLAN NOTE
On Merrem for the Citrobacter; has Psudomonas and Citrobacter in wound cultures; negative blood cultures; followed by wound care; spoke to ID at [facility] who suggested Zerbaxa for the Pseudomonas; On sodium thiosulfate; having fevers and is tachycardic--BP on the low side to start meds; will monitor; appreciate Surgical consult for evaluation of wounds    04/27 discuss with surgery if these organism deep or superficial colonization

## 2024-04-30 NOTE — ASSESSMENT & PLAN NOTE
On Merrem and Zerbaxa; followed by wound care; s/p debridement    04/27 discuss with surgery if deep tissue infection or if bacterium superifical isolates.  Cultures include pseudomonas MDRO / CPPA  04/29 discussed with surgery and organism felt to be colonization and needs aggressive wound care and no IV ATB at this time

## 2024-04-30 NOTE — ASSESSMENT & PLAN NOTE
Nutrition consulted. Most recent weight and BMI monitored-     Measurements:  Wt Readings from Last 1 Encounters:   04/28/24 67.6 kg (149 lb 1.6 oz)   Body mass index is 22.02 kg/m².    Nutrition consult and PAB ordered.    Patient has been screened and assessed by RD.    Malnutrition Type:  Context:    Level:      Malnutrition Characteristic Summary:       Interventions/Recommendations (treatment strategy):  recommend to advance diet as medically appropriate; add Keegan BID

## 2024-04-30 NOTE — PROGRESS NOTES
[facility] - [address] [place] [address] Medicine  Progress Note    Patient Name: Madhav Correa  MRN: [id number]  Patient Class: IP- Inpatient   Admission Date: 4/17/2024  Length of Stay: 11 days  Attending Physician: Stef Emanuel MD  Primary Care Provider: Rehab, [facility]        Subjective:     Principal Problem:Calciphylaxis with nonhealing ulcer of leg        HPI:  34 yo F presents to [facility] ED from [facility] for worsening wounds.  Patient is severely debilitated due to SLE.  She was diagnosed five years ago and has suffered secondary hypertension and  ESRD and is now on HD TTS.  She has also developed severe calciphylaxis during her daily dialysis when she was hospitalized at [facility].  Patient lives in [place] and was transferred for wound care to [facility] because they were one of few nursing homes that would accept HD patients.      This patient has a tragic story and I have read the dc summary from 4/424 regarding the eight week course of thiosulfate and the calciphylaxis becoming worse.  She has developed severe wounds on her legs and buttocks (see photos).  She was on meropenum and levaquin in hospital for MSSA and pseudomonas.  She was discharged to the nursing home with wound care and levaquin.  Patient has worsening wounds and the nursing home sent her to [facility] ED for further evaluation and admission.  Prior to transfer from [facility], the discharging physician and the nephrologist and ID physician spoke to her about considering hospice/palliative care.  Patient was adamant about being a full code.  She has two children (12 yo and 10 yo).  She wants to raise her children.      She states that she is able to stand and walk some with assistance.  She does move her upper extremities and feeds herself but has generalized weakness of all extremities but says she is not familiar with the term transverse myelitis and is not sure why her extremities so  weak (3/5 of all four - able to lift gravity).  She has some atrophy noted but most of her leg problems may be from being bedridden with these wounds.  She has been on steroids previously for lupus flares but not chronically.       Overview/Hospital Course:  33 year old female with an extensive PMH presents with calciphylaxis and is ESRD.  Patient denies chest pain, shortness of breath, nausea, vomiting, or diarrhea.    04/27 Records reviewed. Seen in dialysis. Been on dialysis 2 yrs. Just DC from [facility] one month earlier after 52 day stay.  Wounds to legs and sacral area in last couple months. Concern calciphylaxis. Had debridement area x 2.   PMHx: SLE, ESRD TTS hemodialysis (access AV fistula left arm), GERD. Discuss with surgery. Continue current ATB. Increase activity when OK with surgery. River in secondary to wounds.  04/28 no new issues.  Continue current therapy.  Discussed with surgery.  04/29 Looks OK. Discussed with Dr Gage and no deep wound infection. Needs topical / wound care and to f/u outpt at wound care center. Getting off ATB will help with resistant organisms. Back to NH tomorrow after dialysis. Discussed with Dr Crawford.    Interval History:     Review of Systems   Constitutional:  Negative for appetite change, fatigue and fever.   HENT:  Negative for congestion, hearing loss and trouble swallowing.    Respiratory:  Negative for chest tightness, shortness of breath and wheezing.    Cardiovascular:  Negative for chest pain and palpitations.   Gastrointestinal:  Negative for abdominal pain, constipation and nausea.   Genitourinary:  Negative for difficulty urinating and dysuria.   Musculoskeletal:  Negative for back pain and neck stiffness.   Skin:  Positive for wound. Negative for pallor and rash.        Leg and sacral wounds   Neurological:  Negative for dizziness, speech difficulty and headaches.   Psychiatric/Behavioral:  Negative for confusion and suicidal ideas.      Objective:      Vital Signs (Most Recent):  Temp: 98.4 °F (36.9 °C) (04/29/24 1952)  Pulse: 94 (04/29/24 1952)  Resp: 18 (04/29/24 2118)  BP: 137/87 (04/29/24 1952)  SpO2: 97 % (04/29/24 1952) Vital Signs (24h Range):  Temp:  [97.8 °F (36.6 °C)-98.4 °F (36.9 °C)] 98.4 °F (36.9 °C)  Pulse:  [88-94] 94  Resp:  [16-22] 18  SpO2:  [95 %-98 %] 97 %  BP: (137-143)/(80-98) 137/87     Weight: 67.6 kg (149 lb 1.6 oz)  Body mass index is 22.02 kg/m².    Intake/Output Summary (Last 24 hours) at 4/29/2024 2227  Last data filed at 4/29/2024 0603  Gross per 24 hour   Intake 720 ml   Output 200 ml   Net 520 ml         Physical Exam  Vitals reviewed.   Constitutional:       General: She is awake. She is not in acute distress.     Appearance: She is well-developed. She is not toxic-appearing.   HENT:      Head: Normocephalic.      Nose: Nose normal.      Mouth/Throat:      Pharynx: Oropharynx is clear.   Eyes:      Extraocular Movements: Extraocular movements intact.      Pupils: Pupils are equal, round, and reactive to light.   Neck:      Thyroid: No thyroid mass.      Vascular: No carotid bruit.   Cardiovascular:      Rate and Rhythm: Normal rate and regular rhythm.      Pulses: Normal pulses.      Heart sounds: Normal heart sounds. No murmur heard.  Pulmonary:      Effort: Pulmonary effort is normal.      Breath sounds: Normal breath sounds and air entry. No wheezing.   Abdominal:      General: Bowel sounds are normal. There is no distension.      Palpations: Abdomen is soft.      Tenderness: There is no abdominal tenderness.   Musculoskeletal:         General: Normal range of motion.      Cervical back: Neck supple. No rigidity.      Comments: AV Fistula left upper arm   Skin:     General: Skin is warm.      Coloration: Skin is not jaundiced.      Findings: Lesion present.   Neurological:      General: No focal deficit present.      Mental Status: She is alert and oriented to person, place, and time.      Cranial Nerves: No cranial nerve  deficit.   Psychiatric:         Attention and Perception: Attention normal.         Mood and Affect: Mood normal.         Behavior: Behavior normal. Behavior is cooperative.         Thought Content: Thought content normal.         Cognition and Memory: Cognition normal.             Significant Labs: All pertinent labs within the past 24 hours have been reviewed.  BMP:   Recent Labs   Lab 04/29/24  0303   GLU 92      K 4.6      CO2 23   BUN 39*   CREATININE 4.58*   CALCIUM 8.5     CBC:   Recent Labs   Lab 04/28/24  0236 04/29/24  0303   WBC 7.20 5.88   HGB 7.4* 7.5*   HCT 24.3* 24.3*    242     CMP:   Recent Labs   Lab 04/28/24  0236 04/29/24  0303    137   K 4.3 4.6    103   CO2 28 23   GLU 93 92   BUN 26* 39*   CREATININE 3.65* 4.58*   CALCIUM 8.0* 8.5   ALBUMIN 1.5* 1.5*   ANIONGAP 11 16       Significant Imaging: I have reviewed all pertinent imaging results/findings within the past 24 hours.    Imaging Results              X-Ray Chest AP Portable (Final result)  Result time 04/18/24 07:40:06      Final result by Phillip Fu II, MD (04/18/24 07:40:06)                   Impression:      No evidence of acute cardiopulmonary disease.      Electronically signed by: Phillip Fu  Date:    04/18/2024  Time:    07:40               Narrative:    EXAMINATION:  XR CHEST AP PORTABLE    CLINICAL HISTORY:  Fever, unspecified    COMPARISON:  None available    TECHNIQUE:  XR CHEST AP PORTABLE    FINDINGS:  The heart and mediastinum are normal in size and configuration.  The pulmonary vascularity is normal in caliber.  No lung infiltrates, effusions, pneumothorax or other abnormality is demonstrated.                                    Intake/Output - Last 3 Shifts         04/28 0700  04/29 0659 04/29 0700  04/30 0659    P.O. 1440     I.V. (mL/kg)      Other      IV Piggyback      Total Intake(mL/kg) 1440 (21.3)     Urine (mL/kg/hr) 200 (0.1)     Emesis/NG output 0     Other      Stool 0      Total Output 200     Net +1240           Urine Occurrence 1 x     Stool Occurrence  1 x          Microbiology Results (last 7 days)       Procedure Component Value Units Date/Time    Culture, Wound [[id number]]  (Abnormal)  (Susceptibility) Collected: 04/23/24 1744    Order Status: Completed Specimen: Wound from Leg, Right Updated: 04/25/24 0745     Culture, Wound/Abscess Heavy Growth Pseudomonas aeruginosa     Comment: MDRO (Multiple drug resistant organism)  CRPA (Carbapenem-resistant Pseudomonas areuginosa)       Blood culture x two cultures. Draw prior to antibiotics. [[id number]] Collected: 04/17/24 2332    Order Status: Completed Specimen: Blood Updated: 04/23/24 0632     Culture, Blood No Growth at 5 days    Blood culture x two cultures. Draw prior to antibiotics. [[id number]] Collected: 04/17/24 2327    Order Status: Completed Specimen: Blood Updated: 04/23/24 0631     Culture, Blood No Growth at 5 days              Assessment/Plan:      * Calciphylaxis with nonhealing ulcer of leg  On Merrem for the Citrobacter; has Psudomonas and Citrobacter in wound cultures; negative blood cultures; followed by wound care; spoke to ID at [facility] who suggested Zerbaxa for the Pseudomonas; On sodium thiosulfate; having fevers and is tachycardic--BP on the low side to start meds; will monitor; appreciate Surgical consult for evaluation of wounds    04/27 discuss with surgery if these organism deep or superficial colonization     Tachycardia  Due to infection; BP is too low to give BP meds; will monitor      Fever  Due to infection; on Merrem and Zerbaxa; completed Vancomycin for UTI; will monitor    04/27 none current      Acute cystitis without hematuria  Completed Vancomycin for Staph epidermidis in urine culture but may be contamination    Acute blood loss anemia  S/P transfusion on dialysis; occult blood negative; s/p surgical intervention; Hgb ok    Sacral decubitus ulcer  On Merrem and Zerbaxa; followed by wound care;  s/p debridement    04/27 discuss with surgery if deep tissue infection or if bacterium superifical isolates.  Cultures include pseudomonas MDRO / CPPA  04/29 discussed with surgery and organism felt to be colonization and needs aggressive wound care and no IV ATB at this time    SLE (systemic lupus erythematosus)  No active disease noted at this time    ESRD (end stage renal disease) on dialysis  On HD TTS    Anemia in chronic kidney disease  S/p transfusion on dialysis; monitor Hgb; Hgb ok      VTE Risk Mitigation (From admission, onward)           Ordered     heparin (porcine) injection 5,000 Units  Every 12 hours         04/18/24 0428                    Discharge Planning   ESTHER: 4/25/2024     Code Status: Full Code   Is the patient medically ready for discharge?:     Reason for patient still in hospital (select all that apply): Laboratory test, Treatment, and Imaging  Discharge Plan A: Skilled Nursing Facility                  Stef Emanuel MD  Department of Hospital Medicine   [facility]

## 2024-04-30 NOTE — DISCHARGE SUMMARY
[facility] - [address] [place] [address] Medicine  Discharge Summary      Patient Name: Madhav Correa  MRN: [id number]  AURE: [id number]  Patient Class: IP- Inpatient  Admission Date: 4/17/2024  Hospital Length of Stay: 12 days  Discharge Date and Time:  04/30/2024 1:15 PM  Attending Physician: Stef Emanuel MD   Discharging Provider: Stef Emanuel MD  Primary Care Provider: [facility], [facility]    Primary Care Team: Networked reference to record PCT     HPI:   34 yo F presents to [facility] ED from [facility] for worsening wounds.  Patient is severely debilitated due to SLE.  She was diagnosed five years ago and has suffered secondary hypertension and  ESRD and is now on HD TTS.  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  Patient lives in [place] and was transferred for wound care to [place] because they were one of few nursing [facility] that would accept HD patients.      This patient has a tragic story and I have read the dc summary from 4/424 regarding the eight week course of thiosulfate and the calciphylaxis becoming worse.  She has developed severe wounds on her legs and buttocks (see photos).  She was on meropenum and levaquin in hospital for MSSA and pseudomonas.  She was discharged to the nursing home with wound care and levaquin.  Patient has worsening wounds and the nursing home sent her to [facility] ED for further evaluation and admission.  Prior to transfer from [facility], the discharging physician and the nephrologist and ID physician spoke to her about considering hospice/palliative care.  Patient was adamant about being a full code.  She has two children (12 yo and 12 yo).  She wants to raise her children.      She states that she is able to stand and walk some with assistance.  She does move her upper extremities and feeds herself but has generalized weakness of all extremities but says she  is not familiar with the term transverse myelitis and is not sure why her extremities so weak (3/5 of all four - able to lift gravity).  She has some atrophy noted but most of her leg problems may be from being bedridden with these wounds.  She has been on steroids previously for lupus flares but not chronically.       Procedure(s) (LRB):  DEBRIDEMENT, WOUND, SACRUM (N/A)  DEBRIDEMENT, LOWER EXTREMITY (Bilateral)      Hospital Course:   33 year old female with an extensive PMH presents with calciphylaxis and is ESRD.  Patient denies chest pain, shortness of breath, nausea, vomiting, or diarrhea.    04/27 Records reviewed. Seen in dialysis. Been on dialysis 2 yrs. Just DC from [facility] one month earlier after 52 day stay.  Wounds to legs and sacral area in last couple months. Concern calciphylaxis. Had debridement area x 2.   PMHx: SLE, ESRD TTS hemodialysis (access AV fistula left arm), GERD. Discuss with surgery. Continue current ATB. Increase activity when OK with surgery. River in secondary to wounds.  04/28 no new issues.  Continue current therapy.  Discussed with surgery.  04/29 Looks OK. Discussed with Dr Gage and no deep wound infection. Needs topical / wound care and to f/u outpt at wound care center. Getting off ATB will help with resistant organisms. Back to NH tomorrow after dialysis. Discussed with Dr Crawford.  04/30 patient has undergone dialysis today.  Will plan to discharge back to nursing home.  Continue with aggressive wound care both at nursing home and also outpatient [facility].  Continue with dialysis as previous TTS.  Discharged     Goals of Care Treatment Preferences:  Code Status: Full Code      Consults:   Consults (From admission, onward)          Status Ordering Provider     Inpatient consult to Infectious Diseases  Once        Provider:  (Not yet assigned)    Acknowledged JAE RODRIGUEZ     Inpatient consult to Registered Dietitian/Nutritionist  Once        Provider:   (Not yet assigned)    Completed SUE JUNIOR     Inpatient consult to Nephrology  Once        Provider:  (Not yet assigned)    Acknowledged SUE JUNIOR     Inpatient consult to General Surgery  Once        Provider:  (Not yet assigned)    Completed SUE JUNIOR            Psychiatric  Atypical anxiety disorder-resolved as of 4/22/2024  Continue her home buspar and nighttime zyprexa.  Patient appears depressed with flat affect but she assures me she is not and is hopeful that we can help her and for her future.  However, she has been told of her dismal prognosis.  Will not consult psychiatry at this time.  Patient states her condition is controlled on current psychotropics.        Renal/  ESRD (end stage renal disease) on dialysis  On HD TTS    Immunology/Multi System  SLE (systemic lupus erythematosus)  No active disease noted at this time    Oncology  Anemia in chronic kidney disease  S/p transfusion on dialysis; monitor Hgb; Hgb ok    Endocrine  Secondary hyperparathyroidism-resolved as of 4/22/2024  Appreciate nephrology input.  Continue dialysis per their recommendation      Severe protein-calorie malnutrition-resolved as of 4/22/2024  Nutrition consulted. Most recent weight and BMI monitored-     Measurements:  Wt Readings from Last 1 Encounters:   04/28/24 67.6 kg (149 lb 1.6 oz)   Body mass index is 22.02 kg/m².    Nutrition consult and PAB ordered.    Patient has been screened and assessed by RD.    Malnutrition Type:  Context:    Level:      Malnutrition Characteristic Summary:       Interventions/Recommendations (treatment strategy):  recommend to advance diet as medically appropriate; add Keegan BID      Orthopedic  * Calciphylaxis with nonhealing ulcer of leg  On Merrem for the Citrobacter; has Psudomonas and Citrobacter in wound cultures; negative blood cultures; followed by wound care; spoke to ID at [facility] who suggested Zerbaxa for the Pseudomonas; On sodium thiosulfate; having  fevers and is tachycardic--BP on the low side to start meds; will monitor; appreciate Surgical consult for evaluation of wounds    04/27 discuss with surgery if these organism deep or superficial colonization     Sacral decubitus ulcer  On Merrem and Zerbaxa; followed by wound care; s/p debridement    04/27 discuss with surgery if deep tissue infection or if bacterium superifical isolates.  Cultures include pseudomonas MDRO / CPPA  04/29 discussed with surgery and organism felt to be colonization and needs aggressive wound care and no IV ATB at this time      Final Active Diagnoses:    Diagnosis Date Noted POA    PRINCIPAL PROBLEM:  Calciphylaxis with nonhealing ulcer of leg [E83.59, L97.909]  Yes    Tachycardia [R00.0] 04/26/2024 No    Sacral decubitus ulcer [L89.159] 04/18/2024 Yes    Anemia in chronic kidney disease [N18.9, D63.1]  Yes    ESRD (end stage renal disease) on dialysis [N18.6, Z99.2]  Not Applicable    SLE (systemic lupus erythematosus) [M32.9]  Yes      Problems Resolved During this Admission:    Diagnosis Date Noted Date Resolved POA    Fever [R50.9] 04/26/2024 04/30/2024 No    Acute cystitis without hematuria [N30.00] 04/22/2024 04/30/2024 Yes    Acute blood loss anemia [D62] 04/19/2024 04/30/2024 Yes    Pressure injury of skin with infection [L89.90, L08.9] 04/18/2024 04/22/2024 Yes    Sepsis [A41.9] 04/18/2024 04/22/2024 Yes    Pyuria [R82.81] 04/18/2024 04/22/2024 Yes    Secondary hyperparathyroidism [N25.81] 04/18/2024 04/22/2024 Yes    Asthma [J45.909]  04/22/2024 Yes    GERD (gastroesophageal reflux disease) [K21.9]  04/22/2024 Yes    Secondary hypertension [I15.9]  04/22/2024 Yes    Atypical anxiety disorder [F41.9]  04/22/2024 Yes    Severe protein-calorie malnutrition [E43]  04/22/2024 Yes       Discharged Condition: fair    Disposition: Skilled Nursing Facility    Follow Up:   Contact information for follow-up providers       [facility]. Schedule  an appointment as soon as possible for a visit in 1 week(s).    Specialty: Wound Care  Contact information:  [address] [place] [id number] [phone]                     Contact information for after-discharge care       Destination       [facility] .    Service: Skilled Nursing  Contact information:  [address] [facility] [address]  [phone]                                 Patient Instructions:      Diet renal     Activity as tolerated       Significant Diagnostic Studies: Labs: BMP:   Recent Labs   Lab 04/29/24  0303 04/30/24  0404   GLU 92 94    135*   K 4.6 5.1    103   CO2 23 22   BUN 39* 49*   CREATININE 4.58* 5.48*   CALCIUM 8.5 8.9   , CMP   Recent Labs   Lab 04/29/24 0303 04/30/24  0404    135*   K 4.6 5.1    103   CO2 23 22   GLU 92 94   BUN 39* 49*   CREATININE 4.58* 5.48*   CALCIUM 8.5 8.9   ALBUMIN 1.5* 1.5*   ANIONGAP 16 15   , and CBC   Recent Labs   Lab 04/29/24 0303 04/30/24  0404   WBC 5.88 6.44   HGB 7.5* 8.1*   HCT 24.3* 26.3*    248     Imaging Results              X-Ray Chest AP Portable (Final result)  Result time 04/18/24 07:40:06      Final result by Phillip Fu II, MD (04/18/24 07:40:06)                   Impression:      No evidence of acute cardiopulmonary disease.      Electronically signed by: Phillip Fu  Date:    04/18/2024  Time:    07:40               Narrative:    EXAMINATION:  XR CHEST AP PORTABLE    CLINICAL HISTORY:  Fever, unspecified    COMPARISON:  None available    TECHNIQUE:  XR CHEST AP PORTABLE    FINDINGS:  The heart and mediastinum are normal in size and configuration.  The pulmonary vascularity is normal in caliber.  No lung infiltrates, effusions, pneumothorax or other abnormality is demonstrated.                                    Intake/Output - Last 3 Shifts         04/28 0700 04/29 0659 04/29 0700 04/30 0659 04/30 0700 05/01 0659    P.O. 1440       I.V. (mL/kg)       Other   0    IV Piggyback       Total Intake(mL/kg) 1440 (21.3)  0 (0)    Urine (mL/kg/hr) 200 (0.1)      Emesis/NG output 0      Other   2500    Stool 0      Total Output 200  2500    Net +1240  -2500           Urine Occurrence 1 x 2 x     Stool Occurrence  1 x           Microbiology Results (last 7 days)       Procedure Component Value Units Date/Time    Culture, Wound [[id number]]  (Abnormal)  (Susceptibility) Collected: 04/23/24 1744    Order Status: Completed Specimen: Wound from Leg, Right Updated: 04/25/24 0745     Culture, Wound/Abscess Heavy Growth Pseudomonas aeruginosa     Comment: MDRO (Multiple drug resistant organism)  CRPA (Carbapenem-resistant Pseudomonas areuginosa)             Addendum:  Pseudomonas and organisms on wound cultures are felt to be colonization not represent deep tissue infection        Pending Diagnostic Studies:       Procedure Component Value Units Date/Time    EXTRA TUBES [[id number]] Collected: 04/18/24 0024    Order Status: Sent Lab Status: In process Updated: 04/18/24 0031    Specimen: Blood, Venous     Narrative:      The following orders were created for panel order EXTRA TUBES.  Procedure                               Abnormality         Status                     ---------                               -----------         ------                     Light Blue Top Hold[[id number]]                             In process                   Please view results for these tests on the individual orders.    EXTRA TUBES [[id number]] Collected: 04/17/24 2245    Order Status: Sent Lab Status: In process Updated: 04/17/24 2248    Specimen: Blood, Venous     Narrative:      The following orders were created for panel order EXTRA TUBES.  Procedure                               Abnormality         Status                     ---------                               -----------         ------                     Light Blue Top Hold[[id number]]                              In process                 Light Green Top Hold[[id number]]                            In process                 Light Green Top Hold[[id number]]                            In process                 Lavender Top Hold[[id number]]                               In process                 Gold Top Hold[[id number]]                                   In process                   Please view results for these tests on the individual orders.           Medications:  Reconciled Home Medications:      Medication List        CONTINUE taking these medications      albuterol 2.5 mg /3 mL (0.083 %) nebulizer solution  Commonly known as: PROVENTIL  Inhale 2.5 mg into the lungs.     buPROPion 75 MG tablet  Commonly known as: WELLBUTRIN  Take 75 mg by mouth 2 (two) times daily.     carvediloL 25 MG tablet  Commonly known as: COREG  Take 1 tablet by mouth 2 (two) times daily with meals.     famotidine 20 MG tablet  Commonly known as: PEPCID  Take 20 mg by mouth once daily.     hydrALAZINE 100 MG tablet  Commonly known as: APRESOLINE  Take 100 mg by mouth every 8 (eight) hours.     isosorbide mononitrate 30 MG 24 hr tablet  Commonly known as: IMDUR  Take 30 mg by mouth once daily.     OLANZapine zydis 5 MG Tbdl  Commonly known as: ZyPREXA  Take 5 mg by mouth every evening.     ondansetron 4 MG Tbdl  Commonly known as: ZOFRAN-ODT  Take 4 mg by mouth every 8 (eight) hours as needed.     oxyCODONE 15 MG Tab  Commonly known as: ROXICODONE  Take 15 mg by mouth every 6 (six) hours as needed.     senna-docusate 8.6-50 mg 8.6-50 mg per tablet  Commonly known as: PERICOLACE  Take 2 tablets by mouth 2 (two) times a day.     sevelamer carbonate 800 mg Tab  Commonly known as: RENVELA  Take 1,600 mg by mouth 3 times daily with meals.            STOP taking these medications      levoFLOXacin 500 MG tablet  Commonly known as: LEVAQUIN              Indwelling Lines/Drains at time of discharge:   Lines/Drains/Airways       Drain  Duration                   Hemodialysis AV Fistula Left upper arm -- days    Female External Urinary Catheter w/ Suction 04/24/24 2053 5 days                    Time spent on the discharge of patient: 35 minutes         Stef Emanuel MD  Department of Hospital Medicine  [facility] - [address] [facility] Tele[facility]

## 2024-04-30 NOTE — PROGRESS NOTES
Patient denies shortness of breath. Review of systems, G.I. she denies nausea or vomiting.      Physical exam general patient in no acute distress.    Vitals:    04/30/24 1200 04/30/24 1205 04/30/24 1210 04/30/24 1252   BP: (!) 160/103 (!) 160/98 (!) 155/95    BP Location: Right arm Right arm Right arm    Patient Position: Lying Lying Lying    Pulse: 86 88 85    Resp: 18 18 18 18   Temp:       TempSrc:       SpO2:       Weight:       Height:          Assessment/plan 1. ESRD continue dialysis support.  Two. Calciphylaxis - continue wound care and sodium thiosulfate, touched base with o/p dialysis in [place], will plan on continuing thiosulfate for 3 more months.  Three. Anemia  Four. Lupus.

## 2024-04-30 NOTE — PLAN OF CARE
Problem: Adult Inpatient Plan of Care  Goal: Plan of Care Review  Outcome: Progressing  Goal: Patient-Specific Goal (Individualized)  Outcome: Progressing  Goal: Absence of Hospital-Acquired Illness or Injury  Outcome: Progressing  Goal: Readiness for Transition of Care  Outcome: Progressing     Problem: Infection  Goal: Absence of Infection Signs and Symptoms  Outcome: Progressing     Problem: Bleeding (Sepsis/Septic Shock)  Goal: Absence of Bleeding  Outcome: Progressing     Problem: Glycemic Control Impaired (Sepsis/Septic Shock)  Goal: Blood Glucose Level Within Desired Range  Outcome: Progressing

## 2024-05-23 NOTE — DISCHARGE SUMMARY
[facility] Hospital Medicine  Discharge Summary      Patient Name: Madhav Correa  MRN: [id number]  AURE: [id number]  Patient Class: IP- Inpatient  Admission Date: 4/17/2024  Hospital Length of Stay: 12 days  Discharge Date and Time:  05/23/2024 1:15 PM  Attending Physician: No att. providers found   Discharging Provider: Salma Doe RN  Primary Care Provider: Rehab, [facility]    Primary Care Team: Networked reference to record PCT     HPI:   Chief complaint:  ESRD    History of present illness:  Ms. Correa is a 33-year-old  lady with end-stage renal disease who was transferred to a nursing home [facility] for rehabilitation.  She normally dialyzes in [facility] under the [facility].  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  The patient has received sodium thiosulfate while hospitalized in the [facility] for about 8 weeks.  This apparently was stopped because it did not seem to be improving her calciphylaxis wounds.  The has no history of warfarin use.  She denies any trauma to her legs.  She states the wounds have been present about 3 months.  Patient has been on dialysis for about 4 years.    Procedure(s) (LRB):  DEBRIDEMENT, WOUND, SACRUM (N/A)  DEBRIDEMENT, LOWER EXTREMITY (Bilateral)      Hospital Course:   33 year old female with an extensive PMH presents with calciphylaxis and is ESRD.  Patient denies chest pain, shortness of breath, nausea, vomiting, or diarrhea.    04/27 Records reviewed. Seen in dialysis. Been on dialysis 2 yrs. Just DC from [facility] one month earlier after 52 day stay.  Wounds to legs and sacral area in last couple months. Concern calciphylaxis. Had debridement area x 2.   PMHx: SLE, ESRD TTS hemodialysis (access AV fistula left arm), GERD. Discuss with  surgery. Continue current ATB. Increase activity when OK with surgery. River in secondary to wounds.  04/28 no new issues.  Continue current therapy.  Discussed with surgery.  04/29 Looks OK. Discussed with Dr Gage and no deep wound infection. Needs topical / wound care and to f/u outpt at wound care center. Getting off ATB will help with resistant organisms. Back to NH tomorrow after dialysis. Discussed with Dr Crawford.  04/30 patient has undergone dialysis today.  Will plan to discharge back to nursing home.  Continue with aggressive wound care both at nursing home and also outpatient [facility].  Continue with dialysis as previous TTS.  Discharged     Goals of Care Treatment Preferences:  Code Status: Full Code      Consults:   Consults (From admission, onward)          Status Ordering Provider     Inpatient consult to Registered Dietitian/Nutritionist  Once        Provider:  (Not yet assigned)    Completed SUE JUNIOR     Inpatient consult to General Surgery  Once        Provider:  (Not yet assigned)    Completed SUE JUNIOR            Assessment & plan notes cannot be loaded without a specified hospital service.    Final Active Diagnoses:    Diagnosis Date Noted POA    PRINCIPAL PROBLEM:  Calciphylaxis with nonhealing ulcer of leg [E83.59, L97.909]  Yes    Tachycardia [R00.0] 04/26/2024 No    Sacral decubitus ulcer [L89.159] 04/18/2024 Yes    Anemia in chronic kidney disease [N18.9, D63.1]  Yes    ESRD (end stage renal disease) on dialysis [N18.6, Z99.2]  Not Applicable    SLE (systemic lupus erythematosus) [M32.9]  Yes      Problems Resolved During this Admission:    Diagnosis Date Noted Date Resolved POA    Fever [R50.9] 04/26/2024 04/30/2024 No    Acute cystitis without hematuria [N30.00] 04/22/2024 04/30/2024 Yes    Acute blood loss anemia [D62] 04/19/2024 04/30/2024 Yes    Pressure injury of skin with infection [L89.90, L08.9] 04/18/2024 04/22/2024 Yes    Sepsis [A41.9] 04/18/2024  "04/22/2024 Yes    Pyuria [R82.81] 04/18/2024 04/22/2024 Yes    Secondary hyperparathyroidism [N25.81] 04/18/2024 04/22/2024 Yes    Asthma [J45.909]  04/22/2024 Yes    GERD (gastroesophageal reflux disease) [K21.9]  04/22/2024 Yes    Secondary hypertension [I15.9]  04/22/2024 Yes    Atypical anxiety disorder [F41.9]  04/22/2024 Yes    Severe protein-calorie malnutrition [E43]  04/22/2024 Yes       Discharged Condition: fair    Disposition: Skilled Nursing Facility    Follow Up:   Contact information for follow-up providers       [facility]. Schedule an appointment as soon as possible for a visit in 1 week(s).    Specialty: Wound Care  Contact information:  [address] [place] [id number] [phone]                     Contact information for after-discharge care       Destination       [facility] .    Service: Skilled Nursing  Contact information:  [address] [place] [id number] [phone]                                 Patient Instructions:      Diet renal     Activity as tolerated       Significant Diagnostic Studies: Labs: BMP:   No results for input(s): "GLU", "NA", "K", "CL", "CO2", "BUN", "CREATININE", "CALCIUM", "MG" in the last 48 hours.  , CMP   No results for input(s): "NA", "K", "CL", "CO2", "GLU", "BUN", "CREATININE", "CALCIUM", "PROT", "ALBUMIN", "BILITOT", "ALKPHOS", "AST", "ALT", "ANIONGAP", "ESTGFRAFRICA", "EGFRNONAA" in the last 48 hours.  , and CBC   No results for input(s): "WBC", "HGB", "HCT", "PLT" in the last 48 hours.    Imaging Results              X-Ray Chest AP Portable (Final result)  Result time 04/18/24 07:40:06      Final result by Phillip Fu II, MD (04/18/24 07:40:06)                   Impression:      No evidence of acute cardiopulmonary disease.      Electronically signed by: Phillip Fu  Date:    04/18/2024  Time:    07:40               Narrative:    " EXAMINATION:  XR CHEST AP PORTABLE    CLINICAL HISTORY:  Fever, unspecified    COMPARISON:  None available    TECHNIQUE:  XR CHEST AP PORTABLE    FINDINGS:  The heart and mediastinum are normal in size and configuration.  The pulmonary vascularity is normal in caliber.  No lung infiltrates, effusions, pneumothorax or other abnormality is demonstrated.                                    Intake/Output - Last 3 Shifts       None          Microbiology Results (last 7 days)       Procedure Component Value Units Date/Time    Culture, Wound [[id number]]  (Abnormal)  (Susceptibility) Collected: 04/23/24 1744    Order Status: Completed Specimen: Wound from Leg, Right Updated: 04/25/24 0745     Culture, Wound/Abscess Heavy Growth Pseudomonas aeruginosa     Comment: MDRO (Multiple drug resistant organism)  CRPA (Carbapenem-resistant Pseudomonas areuginosa)             Addendum:  Pseudomonas and organisms on wound cultures are felt to be colonization not represent deep tissue infection        Pending Diagnostic Studies:       Procedure Component Value Units Date/Time    EXTRA TUBES [[id number]] Collected: 04/18/24 0024    Order Status: Sent Lab Status: In process Updated: 04/18/24 0031    Specimen: Blood, Venous     Narrative:      The following orders were created for panel order EXTRA TUBES.  Procedure                               Abnormality         Status                     ---------                               -----------         ------                     Light Blue Top Hold[[id number]]                             In process                   Please view results for these tests on the individual orders.    EXTRA TUBES [[id number]] Collected: 04/17/24 2245    Order Status: Sent Lab Status: In process Updated: 04/17/24 2248    Specimen: Blood, Venous     Narrative:      The following orders were created for panel order EXTRA TUBES.  Procedure                               Abnormality         Status                      ---------                               -----------         ------                     Light Blue Top Hold[[id number]]                             In process                 Light Green Top Hold[[id number]]                            In process                 Light Green Top Hold[[id number]]                            In process                 Lavender Top Hold[[id number]]                               In process                 Gold Top Hold[[id number]]                                   In process                   Please view results for these tests on the individual orders.           Medications:  Reconciled Home Medications:      Medication List        CONTINUE taking these medications      albuterol 2.5 mg /3 mL (0.083 %) nebulizer solution  Commonly known as: PROVENTIL  Inhale 2.5 mg into the lungs.     buPROPion 75 MG tablet  Commonly known as: WELLBUTRIN  Take 75 mg by mouth 2 (two) times daily.     carvediloL 25 MG tablet  Commonly known as: COREG  Take 1 tablet by mouth 2 (two) times daily with meals.     famotidine 20 MG tablet  Commonly known as: PEPCID  Take 20 mg by mouth once daily.     hydrALAZINE 100 MG tablet  Commonly known as: APRESOLINE  Take 100 mg by mouth every 8 (eight) hours.     isosorbide mononitrate 30 MG 24 hr tablet  Commonly known as: IMDUR  Take 30 mg by mouth once daily.     OLANZapine zydis 5 MG Tbdl  Commonly known as: ZyPREXA  Take 5 mg by mouth every evening.     ondansetron 4 MG Tbdl  Commonly known as: ZOFRAN-ODT  Take 4 mg by mouth every 8 (eight) hours as needed.     oxyCODONE 15 MG Tab  Commonly known as: ROXICODONE  Take 15 mg by mouth every 6 (six) hours as needed.     senna-docusate 8.6-50 mg 8.6-50 mg per tablet  Commonly known as: PERICOLACE  Take 2 tablets by mouth 2 (two) times a day.     sevelamer carbonate 800 mg Tab  Commonly known as: RENVELA  Take 1,600 mg by mouth 3 times daily with meals.            STOP taking these medications      levoFLOXacin 500 MG  tablet  Commonly known as: LEVAQUIN              Indwelling Lines/Drains at time of discharge:   Lines/Drains/Airways       Drain  Duration                  Hemodialysis AV Fistula Left upper arm -- days    Female External Urinary Catheter w/ Suction 04/24/24 2053 5 days                    Time spent on the discharge of patient: 35 minutes         Salma Doe, RN  Department of Hospital Medicine  [facility]

## 2024-06-10 ENCOUNTER — HOSPITAL ENCOUNTER (EMERGENCY)
Facility: HOSPITAL | Age: 34
Discharge: HOME | End: 2024-06-10
Payer: MEDICARE

## 2024-06-10 VITALS
HEIGHT: 69 IN | RESPIRATION RATE: 18 BRPM | HEART RATE: 110 BPM | SYSTOLIC BLOOD PRESSURE: 140 MMHG | BODY MASS INDEX: 22.22 KG/M2 | TEMPERATURE: 100 F | WEIGHT: 150 LBS | DIASTOLIC BLOOD PRESSURE: 94 MMHG | OXYGEN SATURATION: 98 %

## 2024-06-10 DIAGNOSIS — E83.42 HYPOMAGNESEMIA: ICD-10-CM

## 2024-06-10 DIAGNOSIS — S81.802A MULTIPLE OPEN WOUNDS OF LEFT LOWER EXTREMITY, INITIAL ENCOUNTER: ICD-10-CM

## 2024-06-10 DIAGNOSIS — N18.6 ESRD (END STAGE RENAL DISEASE): Primary | ICD-10-CM

## 2024-06-10 DIAGNOSIS — R50.9 FEVER: ICD-10-CM

## 2024-06-10 DIAGNOSIS — S81.801A MULTIPLE OPEN WOUNDS OF RIGHT LOWER EXTREMITY, INITIAL ENCOUNTER: ICD-10-CM

## 2024-06-10 LAB
ALBUMIN SERPL BCP-MCNC: 2 G/DL (ref 3.5–5)
ALBUMIN/GLOB SERPL: 0.3 {RATIO}
ALP SERPL-CCNC: 112 U/L (ref 37–98)
ALT SERPL W P-5'-P-CCNC: 5 U/L (ref 13–56)
AMORPH PHOS CRY #/AREA URNS LPF: ABNORMAL /LPF
ANION GAP SERPL CALCULATED.3IONS-SCNC: 13 MMOL/L (ref 7–16)
AST SERPL W P-5'-P-CCNC: 11 U/L (ref 15–37)
BACTERIA #/AREA URNS HPF: ABNORMAL /HPF
BASOPHILS # BLD AUTO: 0 K/UL (ref 0–0.2)
BASOPHILS NFR BLD AUTO: 0 % (ref 0–1)
BILIRUB SERPL-MCNC: 0.6 MG/DL (ref ?–1.2)
BILIRUB UR QL STRIP: NEGATIVE
BUN SERPL-MCNC: 35 MG/DL (ref 7–18)
BUN/CREAT SERPL: 7 (ref 6–20)
CALCIUM SERPL-MCNC: 8.2 MG/DL (ref 8.5–10.1)
CHLORIDE SERPL-SCNC: 100 MMOL/L (ref 98–107)
CLARITY UR: CLEAR
CO2 SERPL-SCNC: 28 MMOL/L (ref 21–32)
COLOR UR: YELLOW
CREAT SERPL-MCNC: 4.71 MG/DL (ref 0.55–1.02)
DIFFERENTIAL METHOD BLD: ABNORMAL
EGFR (NO RACE VARIABLE) (RUSH/TITUS): 12 ML/MIN/1.73M2
EOSINOPHIL # BLD AUTO: 0.06 K/UL (ref 0–0.5)
EOSINOPHIL NFR BLD AUTO: 0.5 % (ref 1–4)
ERYTHROCYTE [DISTWIDTH] IN BLOOD BY AUTOMATED COUNT: 19.7 % (ref 11.5–14.5)
GLOBULIN SER-MCNC: 6 G/DL (ref 2–4)
GLUCOSE SERPL-MCNC: 94 MG/DL (ref 74–106)
GLUCOSE UR STRIP-MCNC: NEGATIVE MG/DL
GROUP A STREP MOLECULAR (OHS): NEGATIVE
HCT VFR BLD AUTO: 22.8 % (ref 38–47)
HGB BLD-MCNC: 6.8 G/DL (ref 12–16)
INFLUENZA A MOLECULAR (OHS): NEGATIVE
INFLUENZA B MOLECULAR (OHS): NEGATIVE
KETONES UR STRIP-SCNC: NEGATIVE MG/DL
LACTATE SERPL-SCNC: 0.5 MMOL/L (ref 0.4–2)
LEUKOCYTE ESTERASE UR QL STRIP: NEGATIVE
LYMPHOCYTES # BLD AUTO: 1.68 K/UL (ref 1–4.8)
LYMPHOCYTES NFR BLD AUTO: 13.8 % (ref 27–41)
MAGNESIUM SERPL-MCNC: 1.5 MG/DL (ref 1.7–2.3)
MCH RBC QN AUTO: 28 PG (ref 27–31)
MCHC RBC AUTO-ENTMCNC: 29.8 G/DL (ref 32–36)
MCV RBC AUTO: 93.8 FL (ref 80–96)
MONOCYTES # BLD AUTO: 1.29 K/UL (ref 0–0.8)
MONOCYTES NFR BLD AUTO: 10.6 % (ref 2–6)
MPC BLD CALC-MCNC: 9.7 FL (ref 9.4–12.4)
NEUTROPHILS # BLD AUTO: 9.17 K/UL (ref 1.8–7.7)
NEUTROPHILS NFR BLD AUTO: 75.1 % (ref 53–65)
NITRITE UR QL STRIP: NEGATIVE
PH UR STRIP: 8.5 PH UNITS
PLATELET # BLD AUTO: 202 K/UL (ref 150–400)
POTASSIUM SERPL-SCNC: 4.6 MMOL/L (ref 3.5–5.1)
PROT SERPL-MCNC: 8 G/DL (ref 6.4–8.2)
PROT UR QL STRIP: >=300
RBC # BLD AUTO: 2.43 M/UL (ref 4.2–5.4)
RBC # UR STRIP: NEGATIVE /UL
RBC #/AREA URNS HPF: ABNORMAL /HPF
SARS-COV-2 RDRP RESP QL NAA+PROBE: NEGATIVE
SODIUM SERPL-SCNC: 136 MMOL/L (ref 136–145)
SP GR UR STRIP: 1.02
SQUAMOUS #/AREA URNS LPF: ABNORMAL /LPF
UROBILINOGEN UR STRIP-ACNC: 0.2 MG/DL
WBC # BLD AUTO: 12.2 K/UL (ref 4.5–11)
WBC #/AREA URNS HPF: ABNORMAL /HPF

## 2024-06-10 PROCEDURE — 83605 ASSAY OF LACTIC ACID: CPT | Performed by: NURSE PRACTITIONER

## 2024-06-10 PROCEDURE — 36592 COLLECT BLOOD FROM PICC: CPT | Performed by: NURSE PRACTITIONER

## 2024-06-10 PROCEDURE — 87502 INFLUENZA DNA AMP PROBE: CPT | Performed by: NURSE PRACTITIONER

## 2024-06-10 PROCEDURE — 87635 SARS-COV-2 COVID-19 AMP PRB: CPT | Performed by: NURSE PRACTITIONER

## 2024-06-10 PROCEDURE — 87186 SC STD MICRODIL/AGAR DIL: CPT | Performed by: NURSE PRACTITIONER

## 2024-06-10 PROCEDURE — 87070 CULTURE OTHR SPECIMN AEROBIC: CPT | Performed by: NURSE PRACTITIONER

## 2024-06-10 PROCEDURE — 99284 EMERGENCY DEPT VISIT MOD MDM: CPT | Mod: GF

## 2024-06-10 PROCEDURE — 81003 URINALYSIS AUTO W/O SCOPE: CPT | Performed by: NURSE PRACTITIONER

## 2024-06-10 PROCEDURE — 25000003 PHARM REV CODE 250: Performed by: NURSE PRACTITIONER

## 2024-06-10 PROCEDURE — 87040 BLOOD CULTURE FOR BACTERIA: CPT | Performed by: NURSE PRACTITIONER

## 2024-06-10 PROCEDURE — 87651 STREP A DNA AMP PROBE: CPT | Performed by: NURSE PRACTITIONER

## 2024-06-10 PROCEDURE — 85025 COMPLETE CBC W/AUTO DIFF WBC: CPT | Performed by: NURSE PRACTITIONER

## 2024-06-10 PROCEDURE — 87077 CULTURE AEROBIC IDENTIFY: CPT | Performed by: NURSE PRACTITIONER

## 2024-06-10 PROCEDURE — 25000003 PHARM REV CODE 250

## 2024-06-10 PROCEDURE — 83735 ASSAY OF MAGNESIUM: CPT | Performed by: NURSE PRACTITIONER

## 2024-06-10 PROCEDURE — 63600175 PHARM REV CODE 636 W HCPCS

## 2024-06-10 PROCEDURE — 99284 EMERGENCY DEPT VISIT MOD MDM: CPT | Mod: 25

## 2024-06-10 PROCEDURE — 96365 THER/PROPH/DIAG IV INF INIT: CPT

## 2024-06-10 PROCEDURE — 80053 COMPREHEN METABOLIC PANEL: CPT | Performed by: NURSE PRACTITIONER

## 2024-06-10 PROCEDURE — 36415 COLL VENOUS BLD VENIPUNCTURE: CPT | Performed by: NURSE PRACTITIONER

## 2024-06-10 PROCEDURE — 96375 TX/PRO/DX INJ NEW DRUG ADDON: CPT

## 2024-06-10 RX ORDER — SULFAMETHOXAZOLE AND TRIMETHOPRIM 800; 160 MG/1; MG/1
1 TABLET ORAL 2 TIMES DAILY
Qty: 14 TABLET | Refills: 0 | Status: SHIPPED | OUTPATIENT
Start: 2024-06-10 | End: 2024-06-24

## 2024-06-10 RX ORDER — OXYCODONE AND ACETAMINOPHEN 5; 325 MG/1; MG/1
1 TABLET ORAL
Status: COMPLETED | OUTPATIENT
Start: 2024-06-10 | End: 2024-06-10

## 2024-06-10 RX ORDER — ACETAMINOPHEN 500 MG
1000 TABLET ORAL
Status: COMPLETED | OUTPATIENT
Start: 2024-06-10 | End: 2024-06-10

## 2024-06-10 RX ORDER — ONDANSETRON HYDROCHLORIDE 2 MG/ML
4 INJECTION, SOLUTION INTRAVENOUS
Status: COMPLETED | OUTPATIENT
Start: 2024-06-10 | End: 2024-06-10

## 2024-06-10 RX ORDER — IBUPROFEN 400 MG/1
800 TABLET ORAL
Status: COMPLETED | OUTPATIENT
Start: 2024-06-10 | End: 2024-06-10

## 2024-06-10 RX ADMIN — ONDANSETRON 4 MG: 2 INJECTION INTRAMUSCULAR; INTRAVENOUS at 07:06

## 2024-06-10 RX ADMIN — CEFTRIAXONE SODIUM 2 G: 2 INJECTION, POWDER, FOR SOLUTION INTRAMUSCULAR; INTRAVENOUS at 07:06

## 2024-06-10 RX ADMIN — OXYCODONE HYDROCHLORIDE AND ACETAMINOPHEN 1 TABLET: 5; 325 TABLET ORAL at 06:06

## 2024-06-10 RX ADMIN — IBUPROFEN 800 MG: 400 TABLET ORAL at 07:06

## 2024-06-10 RX ADMIN — ACETAMINOPHEN 1000 MG: 500 TABLET ORAL at 05:06

## 2024-06-10 NOTE — ED PROVIDER NOTES
Encounter Date: 6/10/2024       History     Chief Complaint   Patient presents with    Fever     Pt presents with fever and increased weakness since Sunday afternoon.     Weakness     Madhav Correa is a 34 y.o. Unknown /female presenting to ED from [facility] with fever and tachycardia. She has ESRD on dialysis. Multiple decub wounds to BLE and sacrum. NH gave 1 dose of Tylenol yesterday but none since night shift came on. Patient has no complaints other than occasional nausea. Last dialyzed 2 days ago. Scheduled to dialyze tomorrow. Last wound care was 3 days ago. Currently in NAD. Febrile and tachycardic. Otherwise, VSS at this time.      The history is provided by the patient, the nursing home and the EMS personnel.     Review of patient's allergies indicates:   Allergen Reactions    Iodine Swelling     Causes swelling, itching , and nausea.    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 MD    DEBRIDEMENT OF LOWER EXTREMITY Bilateral 4/18/2024    Procedure: DEBRIDEMENT, LOWER EXTREMITY;  Surgeon: Henok Gage DO;  Location: [facility] OR;  Service: General;  Laterality: Bilateral;    DEBRIDEMENT OF LOWER EXTREMITY Bilateral 4/24/2024    Procedure: DEBRIDEMENT, LOWER EXTREMITY;  Surgeon: Henok Gage DO;  Location: [facility] OR;  Service: General;   Laterality: Bilateral;    DEBRIDEMENT OF SACRAL WOUND N/A 4/18/2024    Procedure: DEBRIDEMENT, WOUND, SACRUM;  Surgeon: Henok Gage DO;  Location: [facility] OR;  Service: General;  Laterality: N/A;    DEBRIDEMENT OF SACRAL WOUND N/A 4/24/2024    Procedure: DEBRIDEMENT, WOUND, SACRUM;  Surgeon: Henok Gage DO;  Location: [facility] OR;  Service: General;  Laterality: N/A;    DIALYSIS FISTULA CREATION Left 06/02/2022    Upper extremity by Ben Hager MD    TUBAL LIGATION       Family History   Problem Relation Name Age of Onset    Hypertension Mother      Hypertension Father      Diabetes Daughter      Cancer Maternal Aunt          breast cancer     Social History     Tobacco Use    Smoking status: Former     Current packs/day: 1.00     Average packs/day: 1 pack/day for 4.1 years (4.1 ttl pk-yrs)     Types: Cigarettes     Start date: 5/6/2020     Quit date: 5/6/2015    Smokeless tobacco: Never   Substance Use Topics    Alcohol use: Never    Drug use: Never     Review of Systems   Constitutional:  Positive for chills, fatigue and fever.   HENT:  Negative for congestion, rhinorrhea, sinus pressure, sinus pain and sore throat.    Respiratory:  Negative for cough and shortness of breath.    Cardiovascular:  Negative for chest pain and palpitations.   Gastrointestinal:  Positive for nausea. Negative for abdominal pain, constipation, diarrhea and vomiting.   Genitourinary:  Negative for dysuria, frequency and urgency.   Musculoskeletal:  Positive for arthralgias, gait problem and myalgias.   Skin:  Positive for wound.   Neurological:  Negative for dizziness and light-headedness.   Psychiatric/Behavioral:  Negative for confusion. The patient is not nervous/anxious.    All other systems reviewed and are negative.      Physical Exam     Initial Vitals [06/10/24 0529]   BP Pulse Resp Temp SpO2   (!) 150/96 (!) 125 (!) 24 (!) 103.4 °F (39.7 °C) 97 %      MAP       --         Physical Exam    Nursing note and vitals  reviewed.  Constitutional: She appears well-developed and well-nourished. She is cooperative. She appears ill (chronically). No distress.   HENT:   Head: Normocephalic and atraumatic.   Right Ear: External ear normal.   Left Ear: External ear normal.   Nose: Nose normal.   Mouth/Throat: Oropharynx is clear and moist.   Eyes: Conjunctivae are normal. Pupils are equal, round, and reactive to light. No scleral icterus.   Neck: Neck supple. No JVD present.   Normal range of motion.  Cardiovascular:  Normal rate, regular rhythm and intact distal pulses.           Murmur heard.  Pulmonary/Chest: Breath sounds normal. No respiratory distress.   Abdominal: Abdomen is soft. Bowel sounds are normal. She exhibits no distension. There is no abdominal tenderness. There is no rebound and no guarding.   Musculoskeletal:         General: Normal range of motion.      Cervical back: Normal range of motion and neck supple.     Lymphadenopathy:     She has no cervical adenopathy.   Neurological: She is alert and oriented to person, place, and time. She has normal strength. GCS score is 15. GCS eye subscore is 4. GCS verbal subscore is 5. GCS motor subscore is 6.   Skin: Skin is warm and dry. Capillary refill takes less than 2 seconds.              Medical Screening Exam   See Full Note    ED Course   Procedures  Labs Reviewed   COMPREHENSIVE METABOLIC PANEL - Abnormal; Notable for the following components:       Result Value    BUN 35 (*)     Creatinine 4.71 (*)     Calcium 8.2 (*)     Albumin 2.0 (*)     Globulin 6.0 (*)     Alk Phos 112 (*)     ALT 5 (*)     AST 11 (*)     eGFR 12 (*)     All other components within normal limits   URINALYSIS, REFLEX TO URINE CULTURE - Abnormal; Notable for the following components:    pH, UA 8.5 (*)     Protein, UA >=300 (*)     All other components within normal limits   MAGNESIUM - Abnormal; Notable for the following components:    Magnesium 1.5 (*)     All other components within normal limits    CBC WITH DIFFERENTIAL - Abnormal; Notable for the following components:    WBC 12.20 (*)     RBC 2.43 (*)     Hemoglobin 6.8 (*)     Hematocrit 22.8 (*)     MCHC 29.8 (*)     RDW 19.7 (*)     Neutrophils % 75.1 (*)     Lymphocytes % 13.8 (*)     Neutrophils, Abs 9.17 (*)     Monocytes % 10.6 (*)     Eosinophils % 0.5 (*)     Monocytes, Absolute 1.29 (*)     All other components within normal limits   URINALYSIS, MICROSCOPIC - Abnormal; Notable for the following components:    RBC, UA 3-5 (*)     Bacteria, UA Few (*)     Squamous Epithelial Cells, UA Moderate (*)     Amorphous Crystals, UA Few (*)     All other components within normal limits   INFLUENZA A & B BY MOLECULAR - Normal   LACTIC ACID, PLASMA - Normal   STREP A BY MOLECULAR METHOD - Normal   SARS-COV-2 RNA AMPLIFICATION, QUAL - Normal    Narrative:     Negative SARS-CoV results should not be used as the sole basis for treatment or patient management decisions; negative results should be considered in the context of a patient's recent exposures, history and the presene of clinical signs and symptoms consistent with COVID-19.  Negative results should be treated as presumptive and confirmed by molecular assay, if necessary for patient management.   CULTURE, BLOOD   CULTURE, BLOOD   CULTURE, WOUND   CULTURE, WOUND   CULTURE, WOUND   CULTURE, WOUND   CULTURE, WOUND   CULTURE, WOUND   CBC W/ AUTO DIFFERENTIAL    Narrative:     The following orders were created for panel order CBC auto differential.  Procedure                               Abnormality         Status                     ---------                               -----------         ------                     CBC with Differential[[id number]]       Abnormal            Final result                 Please view results for these tests on the individual orders.   SARS-COV2 (COVID) W/ FLU ANTIGEN          Imaging Results              X-Ray Chest AP Portable (Final result)  Result time 06/10/24 07:35:45       Final result by Phillip Fu II, MD (06/10/24 07:35:45)                   Impression:      Findings suggest mild cardiac decompensation and / or pneumonitis.      Electronically signed by: Phillip Fu  Date:    06/10/2024  Time:    07:35               Narrative:    EXAMINATION:  XR CHEST AP PORTABLE    CLINICAL HISTORY:  Fever, unspecified    COMPARISON:  17 April 2024    TECHNIQUE:  XR CHEST AP PORTABLE    FINDINGS:  The heart and mediastinum are stable in size and configuration.  The pulmonary vascularity is slightly increased with bilateral increased interstitial lung density.  No other lung infiltrates, effusions, pneumothorax or other abnormality is demonstrated.                                       Medications   acetaminophen tablet 1,000 mg (1,000 mg Oral Given 6/10/24 0559)   oxyCODONE-acetaminophen 5-325 mg per tablet 1 tablet (1 tablet Oral Given 6/10/24 0638)   cefTRIAXone (ROCEPHIN) 2 g in dextrose 5 % in water (D5W) 100 mL IVPB (MB+) (0 g Intravenous Stopped 6/10/24 0741)   ibuprofen tablet 800 mg (800 mg Oral Given 6/10/24 0721)   ondansetron injection 4 mg (4 mg Intravenous Given 6/10/24 0720)     Medical Decision Making  Madhav Correa is a 34 y.o. Unknown /female presenting to ED from [facility] with fever and tachycardia. She has ESRD on dialysis. Multiple decub wounds to BLE and sacrum. NH gave 1 dose of Tylenol yesterday but none since night shift came on. Patient has no complaints other than occasional nausea. Last dialyzed 2 days ago. Scheduled to dialyze tomorrow. Last wound care was 3 days ago. Currently in NAD. Febrile and tachycardic. Otherwise, VSS at this time.      The history is provided by the patient, the nursing home and the EMS personnel.       Amount and/or Complexity of Data Reviewed  Independent Historian:      Details: Madhav Correa is a 34 y.o. Unknown /female presenting to ED from [facility] with fever and tachycardia. She has ESRD on  dialysis. Multiple decub wounds to BLE and sacrum. NH gave 1 dose of Tylenol yesterday but none since night shift came on. Patient has no complaints other than occasional nausea. Last dialyzed 2 days ago. Scheduled to dialyze tomorrow. Last wound care was 3 days ago. Currently in NAD. Febrile and tachycardic. Otherwise, VSS at this time.    Labs: ordered. Decision-making details documented in ED Course.     Details: CBC  CMP  Lactic acid  Urinalysis  BC x2- pending  Wound cx- pending  Radiology: ordered.     Details: XR chest    Risk  OTC drugs.  Prescription drug management.               ED Course as of 06/10/24 0801   Mon Michael 10, 2024   0633 BUN(!): 35  Patient is ESRD currently on hemodialysis    [AC]   0633 Creatinine(!): 4.71  Patient is ESRD currently on hemodialysis  [AC]   0637 WBC(!): 12.20 [AC]   0637 RBC(!): 2.43 [AC]   0637 Hemoglobin(!): 6.8  Baseline from previous labs are 7.0 and 23.0 [AC]   0637 Hematocrit(!): 22.8 [AC]   0757 Lab results reviewed by me and discussed with patient.Discharge instructions given along with strict return precautions, patient verbalizes understanding.   [AC]      ED Course User Index  [AC] Arsenio Garcia FNP                           Clinical Impression:   Final diagnoses:  [R50.9] Fever  [N18.6] ESRD (end stage renal disease) (Primary)  [E83.42] Hypomagnesemia  [S81.801A] Multiple open wounds of right lower extremity, initial encounter  [S81.802A] Multiple open wounds of left lower extremity, initial encounter               Arsenio Garcia FNP  06/10/24 0801

## 2024-06-10 NOTE — DISCHARGE INSTRUCTIONS
- Give Bactrim as directed by the label on the label.  - Alternate Tylenol and Motrin every three hours as needed for fever.

## 2024-06-12 LAB
MICROORGANISM SPEC CULT: ABNORMAL

## 2024-06-16 LAB
BACTERIA BLD CULT: NORMAL
BACTERIA BLD CULT: NORMAL

## 2024-06-17 ENCOUNTER — LAB REQUISITION (OUTPATIENT)
Dept: LAB | Facility: HOSPITAL | Age: 34
End: 2024-06-17
Attending: FAMILY MEDICINE

## 2024-06-17 ENCOUNTER — TELEPHONE (OUTPATIENT)
Dept: EMERGENCY MEDICINE | Facility: HOSPITAL | Age: 34
End: 2024-06-17
Payer: MEDICARE

## 2024-06-17 DIAGNOSIS — R50.9 FEVER, UNSPECIFIED: ICD-10-CM

## 2024-06-17 LAB
BASOPHILS # BLD AUTO: 0 K/UL (ref 0–0.2)
BASOPHILS NFR BLD AUTO: 0 % (ref 0–1)
DIFFERENTIAL METHOD BLD: ABNORMAL
EOSINOPHIL # BLD AUTO: 0.16 K/UL (ref 0–0.5)
EOSINOPHIL NFR BLD AUTO: 3.4 % (ref 1–4)
EOSINOPHIL NFR BLD MANUAL: 2 % (ref 1–4)
ERYTHROCYTE [DISTWIDTH] IN BLOOD BY AUTOMATED COUNT: 19 % (ref 11.5–14.5)
HCT VFR BLD AUTO: 22 % (ref 38–47)
HGB BLD-MCNC: 6.4 G/DL (ref 12–16)
LYMPHOCYTES # BLD AUTO: 1.39 K/UL (ref 1–4.8)
LYMPHOCYTES NFR BLD AUTO: 29.3 % (ref 27–41)
LYMPHOCYTES NFR BLD MANUAL: 46 % (ref 27–41)
MCH RBC QN AUTO: 27.1 PG (ref 27–31)
MCHC RBC AUTO-ENTMCNC: 29.1 G/DL (ref 32–36)
MCV RBC AUTO: 93.2 FL (ref 80–96)
MONOCYTES # BLD AUTO: 0.55 K/UL (ref 0–0.8)
MONOCYTES NFR BLD AUTO: 11.6 % (ref 2–6)
MONOCYTES NFR BLD MANUAL: 6 % (ref 2–6)
MPC BLD CALC-MCNC: 10.8 FL (ref 9.4–12.4)
NEUTROPHILS # BLD AUTO: 2.65 K/UL (ref 1.8–7.7)
NEUTROPHILS NFR BLD AUTO: 55.7 % (ref 53–65)
NEUTS BAND NFR BLD MANUAL: 2 % (ref 1–5)
NEUTS SEG NFR BLD MANUAL: 44 % (ref 50–62)
NRBC BLD MANUAL-RTO: ABNORMAL %
PLATELET # BLD AUTO: 229 K/UL (ref 150–400)
PLATELET MORPHOLOGY: NORMAL
RBC # BLD AUTO: 2.36 M/UL (ref 4.2–5.4)
RBC MORPH BLD: NORMAL
WBC # BLD AUTO: 4.75 K/UL (ref 4.5–11)

## 2024-06-17 PROCEDURE — 85025 COMPLETE CBC W/AUTO DIFF WBC: CPT | Performed by: FAMILY MEDICINE

## 2024-06-17 NOTE — TELEPHONE ENCOUNTER
FAXED TO [facility].    ----- Message from SHAQUILLE Hu sent at 6/17/2024  5:44 AM CDT -----  Sent copy of results to Betito Lentz for PCP to review for further treatment.

## 2024-08-10 ENCOUNTER — HOSPITAL ENCOUNTER (INPATIENT)
Facility: HOSPITAL | Age: 34
LOS: 4 days | Discharge: NURSING, REHAB OR LONG-TERM CARE | DRG: 871 | End: 2024-08-14
Attending: INTERNAL MEDICINE | Admitting: INTERNAL MEDICINE
Payer: MEDICARE

## 2024-08-10 DIAGNOSIS — E87.5 HYPERKALEMIA, DIMINISHED RENAL EXCRETION: ICD-10-CM

## 2024-08-10 DIAGNOSIS — N18.6 ANEMIA IN CHRONIC KIDNEY DISEASE, ON CHRONIC DIALYSIS: ICD-10-CM

## 2024-08-10 DIAGNOSIS — E43 SEVERE PROTEIN-CALORIE MALNUTRITION: ICD-10-CM

## 2024-08-10 DIAGNOSIS — N18.6 ESRD (END STAGE RENAL DISEASE) ON DIALYSIS: ICD-10-CM

## 2024-08-10 DIAGNOSIS — N39.0 URINARY TRACT INFECTION WITHOUT HEMATURIA, SITE UNSPECIFIED: Primary | ICD-10-CM

## 2024-08-10 DIAGNOSIS — I50.9 CHF (CONGESTIVE HEART FAILURE): ICD-10-CM

## 2024-08-10 DIAGNOSIS — F41.9 ANXIETY DISORDER, UNSPECIFIED TYPE: ICD-10-CM

## 2024-08-10 DIAGNOSIS — L89.159 PRESSURE INJURY OF SKIN OF SACRAL REGION, UNSPECIFIED INJURY STAGE: ICD-10-CM

## 2024-08-10 DIAGNOSIS — J45.909 ASTHMA, UNSPECIFIED ASTHMA SEVERITY, UNSPECIFIED WHETHER COMPLICATED, UNSPECIFIED WHETHER PERSISTENT: ICD-10-CM

## 2024-08-10 DIAGNOSIS — E83.59 CALCIPHYLAXIS WITH NONHEALING ULCER OF LEG: ICD-10-CM

## 2024-08-10 DIAGNOSIS — U07.1 COVID-19: ICD-10-CM

## 2024-08-10 DIAGNOSIS — Z99.2 ANEMIA IN CHRONIC KIDNEY DISEASE, ON CHRONIC DIALYSIS: ICD-10-CM

## 2024-08-10 DIAGNOSIS — Z99.2 ESRD (END STAGE RENAL DISEASE) ON DIALYSIS: ICD-10-CM

## 2024-08-10 DIAGNOSIS — D63.1 ANEMIA IN CHRONIC KIDNEY DISEASE, ON CHRONIC DIALYSIS: ICD-10-CM

## 2024-08-10 DIAGNOSIS — E87.5 HYPERKALEMIA: ICD-10-CM

## 2024-08-10 DIAGNOSIS — R00.0 TACHYCARDIA: ICD-10-CM

## 2024-08-10 DIAGNOSIS — R50.9 FEVER: ICD-10-CM

## 2024-08-10 DIAGNOSIS — A41.9 SEPSIS, DUE TO UNSPECIFIED ORGANISM, UNSPECIFIED WHETHER ACUTE ORGAN DYSFUNCTION PRESENT: ICD-10-CM

## 2024-08-10 DIAGNOSIS — L97.909 CALCIPHYLAXIS WITH NONHEALING ULCER OF LEG: ICD-10-CM

## 2024-08-10 DIAGNOSIS — M32.14 OTHER SYSTEMIC LUPUS ERYTHEMATOSUS WITH GLOMERULAR DISEASE: ICD-10-CM

## 2024-08-10 DIAGNOSIS — A41.9 SEPSIS: ICD-10-CM

## 2024-08-10 DIAGNOSIS — K21.9 GASTROESOPHAGEAL REFLUX DISEASE, UNSPECIFIED WHETHER ESOPHAGITIS PRESENT: ICD-10-CM

## 2024-08-10 LAB
ALBUMIN SERPL BCP-MCNC: 2.3 G/DL (ref 3.5–5)
ALBUMIN/GLOB SERPL: 0.3 {RATIO}
ALP SERPL-CCNC: 124 U/L (ref 37–98)
ALT SERPL W P-5'-P-CCNC: 8 U/L (ref 13–56)
ANION GAP SERPL CALCULATED.3IONS-SCNC: 18 MMOL/L (ref 7–16)
ANISOCYTOSIS BLD QL SMEAR: ABNORMAL
AST SERPL W P-5'-P-CCNC: 15 U/L (ref 15–37)
BACTERIA #/AREA URNS HPF: ABNORMAL /HPF
BASOPHILS # BLD AUTO: 0.04 K/UL (ref 0–0.2)
BASOPHILS NFR BLD AUTO: 0.3 % (ref 0–1)
BILIRUB SERPL-MCNC: 0.9 MG/DL (ref ?–1.2)
BILIRUB UR QL STRIP: NEGATIVE
BUN SERPL-MCNC: 103 MG/DL (ref 7–18)
BUN/CREAT SERPL: 11 (ref 6–20)
CALCIUM SERPL-MCNC: 8.4 MG/DL (ref 8.5–10.1)
CHLORIDE SERPL-SCNC: 104 MMOL/L (ref 98–107)
CLARITY UR: ABNORMAL
CO2 SERPL-SCNC: 19 MMOL/L (ref 21–32)
COLOR UR: YELLOW
CREAT SERPL-MCNC: 9.78 MG/DL (ref 0.55–1.02)
CRENATED CELLS: ABNORMAL
DIFFERENTIAL METHOD BLD: ABNORMAL
DOHLE BOD BLD QL SMEAR: ABNORMAL
EGFR (NO RACE VARIABLE) (RUSH/TITUS): 5 ML/MIN/1.73M2
EOSINOPHIL # BLD AUTO: 0.02 K/UL (ref 0–0.5)
EOSINOPHIL NFR BLD AUTO: 0.1 % (ref 1–4)
ERYTHROCYTE [DISTWIDTH] IN BLOOD BY AUTOMATED COUNT: 20.2 % (ref 11.5–14.5)
GLOBULIN SER-MCNC: 7 G/DL (ref 2–4)
GLUCOSE SERPL-MCNC: 89 MG/DL (ref 70–105)
GLUCOSE SERPL-MCNC: 92 MG/DL (ref 74–106)
GLUCOSE SERPL-MCNC: 97 MG/DL (ref 70–105)
GLUCOSE UR STRIP-MCNC: NORMAL MG/DL
HAV IGM SER QL: NORMAL
HBV CORE IGM SER QL: NORMAL
HBV SURFACE AG SERPL QL IA: NORMAL
HCT VFR BLD AUTO: 28.5 % (ref 38–47)
HCV AB SER QL: NORMAL
HGB BLD-MCNC: 8.6 G/DL (ref 12–16)
HYPOCHROMIA BLD QL SMEAR: ABNORMAL
IMM GRANULOCYTES # BLD AUTO: 0.13 K/UL (ref 0–0.04)
IMM GRANULOCYTES NFR BLD: 0.9 % (ref 0–0.4)
KETONES UR STRIP-SCNC: NEGATIVE MG/DL
LACTATE SERPL-SCNC: 1.5 MMOL/L (ref 0.4–2)
LEUKOCYTE ESTERASE UR QL STRIP: ABNORMAL
LYMPHOCYTES # BLD AUTO: 1.08 K/UL (ref 1–4.8)
LYMPHOCYTES NFR BLD AUTO: 7.4 % (ref 27–41)
LYMPHOCYTES NFR BLD MANUAL: 6 % (ref 27–41)
MAGNESIUM SERPL-MCNC: 2.1 MG/DL (ref 1.7–2.3)
MCH RBC QN AUTO: 27.4 PG (ref 27–31)
MCHC RBC AUTO-ENTMCNC: 30.2 G/DL (ref 32–36)
MCV RBC AUTO: 90.8 FL (ref 80–96)
MONOCYTES # BLD AUTO: 0.54 K/UL (ref 0–0.8)
MONOCYTES NFR BLD AUTO: 3.7 % (ref 2–6)
MONOCYTES NFR BLD MANUAL: 5 % (ref 2–6)
MPC BLD CALC-MCNC: 9.9 FL (ref 9.4–12.4)
NEUTROPHILS # BLD AUTO: 12.8 K/UL (ref 1.8–7.7)
NEUTROPHILS NFR BLD AUTO: 87.6 % (ref 53–65)
NEUTS BAND NFR BLD MANUAL: 16 % (ref 1–5)
NEUTS SEG NFR BLD MANUAL: 73 % (ref 50–62)
NITRITE UR QL STRIP: NEGATIVE
NRBC # BLD AUTO: 0 X10E3/UL
NRBC, AUTO (.00): 0 %
OVALOCYTES BLD QL SMEAR: ABNORMAL
PH UR STRIP: 7.5 PH UNITS
PHOSPHATE SERPL-MCNC: 6.5 MG/DL (ref 2.5–4.5)
PLATELET # BLD AUTO: 253 K/UL (ref 150–400)
PLATELET MORPHOLOGY: NORMAL
POLYCHROMASIA BLD QL SMEAR: ABNORMAL
POTASSIUM SERPL-SCNC: 8 MMOL/L (ref 3.5–5.1)
PROT SERPL-MCNC: 9.3 G/DL (ref 6.4–8.2)
PROT UR QL STRIP: 100
RBC # BLD AUTO: 3.14 M/UL (ref 4.2–5.4)
RBC # UR STRIP: ABNORMAL /UL
RBC #/AREA URNS HPF: 2 /HPF
SCHISTOCYTES BLD QL AUTO: ABNORMAL
SODIUM SERPL-SCNC: 133 MMOL/L (ref 136–145)
SP GR UR STRIP: 1.01
SQUAMOUS #/AREA URNS LPF: ABNORMAL /HPF
TARGETS BLD QL SMEAR: ABNORMAL
TOXIC GRANULES BLD QL SMEAR: ABNORMAL
UROBILINOGEN UR STRIP-ACNC: NORMAL MG/DL
WBC # BLD AUTO: 14.61 K/UL (ref 4.5–11)
WBC #/AREA URNS HPF: 12 /HPF

## 2024-08-10 PROCEDURE — 83605 ASSAY OF LACTIC ACID: CPT | Performed by: NURSE PRACTITIONER

## 2024-08-10 PROCEDURE — 84100 ASSAY OF PHOSPHORUS: CPT | Performed by: NURSE PRACTITIONER

## 2024-08-10 PROCEDURE — 80074 ACUTE HEPATITIS PANEL: CPT | Performed by: INTERNAL MEDICINE

## 2024-08-10 PROCEDURE — 99285 EMERGENCY DEPT VISIT HI MDM: CPT | Mod: 25

## 2024-08-10 PROCEDURE — 87086 URINE CULTURE/COLONY COUNT: CPT | Performed by: NURSE PRACTITIONER

## 2024-08-10 PROCEDURE — 80100014 HC HEMODIALYSIS 1:1

## 2024-08-10 PROCEDURE — 93005 ELECTROCARDIOGRAM TRACING: CPT

## 2024-08-10 PROCEDURE — 25000003 PHARM REV CODE 250: Performed by: INTERNAL MEDICINE

## 2024-08-10 PROCEDURE — 83735 ASSAY OF MAGNESIUM: CPT | Performed by: NURSE PRACTITIONER

## 2024-08-10 PROCEDURE — 96375 TX/PRO/DX INJ NEW DRUG ADDON: CPT

## 2024-08-10 PROCEDURE — 87186 SC STD MICRODIL/AGAR DIL: CPT | Performed by: NURSE PRACTITIONER

## 2024-08-10 PROCEDURE — 85025 COMPLETE CBC W/AUTO DIFF WBC: CPT | Performed by: NURSE PRACTITIONER

## 2024-08-10 PROCEDURE — 11000001 HC ACUTE MED/SURG PRIVATE ROOM

## 2024-08-10 PROCEDURE — 36415 COLL VENOUS BLD VENIPUNCTURE: CPT | Performed by: NURSE PRACTITIONER

## 2024-08-10 PROCEDURE — 25000003 PHARM REV CODE 250: Performed by: HOSPITALIST

## 2024-08-10 PROCEDURE — 63600175 PHARM REV CODE 636 W HCPCS: Performed by: NURSE PRACTITIONER

## 2024-08-10 PROCEDURE — 93010 ELECTROCARDIOGRAM REPORT: CPT | Mod: ,,, | Performed by: INTERNAL MEDICINE

## 2024-08-10 PROCEDURE — 5A1D70Z PERFORMANCE OF URINARY FILTRATION, INTERMITTENT, LESS THAN 6 HOURS PER DAY: ICD-10-PCS | Performed by: INTERNAL MEDICINE

## 2024-08-10 PROCEDURE — 82962 GLUCOSE BLOOD TEST: CPT

## 2024-08-10 PROCEDURE — 25000003 PHARM REV CODE 250: Performed by: NURSE PRACTITIONER

## 2024-08-10 PROCEDURE — 87077 CULTURE AEROBIC IDENTIFY: CPT | Performed by: NURSE PRACTITIONER

## 2024-08-10 PROCEDURE — 99223 1ST HOSP IP/OBS HIGH 75: CPT | Mod: ,,, | Performed by: HOSPITALIST

## 2024-08-10 PROCEDURE — 63600175 PHARM REV CODE 636 W HCPCS: Performed by: HOSPITALIST

## 2024-08-10 PROCEDURE — 81001 URINALYSIS AUTO W/SCOPE: CPT | Performed by: NURSE PRACTITIONER

## 2024-08-10 PROCEDURE — 80053 COMPREHEN METABOLIC PANEL: CPT | Performed by: NURSE PRACTITIONER

## 2024-08-10 PROCEDURE — 87040 BLOOD CULTURE FOR BACTERIA: CPT | Performed by: NURSE PRACTITIONER

## 2024-08-10 PROCEDURE — 81003 URINALYSIS AUTO W/O SCOPE: CPT | Performed by: NURSE PRACTITIONER

## 2024-08-10 PROCEDURE — 96365 THER/PROPH/DIAG IV INF INIT: CPT

## 2024-08-10 RX ORDER — MUPIROCIN 20 MG/G
OINTMENT TOPICAL 2 TIMES DAILY
Status: DISCONTINUED | OUTPATIENT
Start: 2024-08-10 | End: 2024-08-14 | Stop reason: HOSPADM

## 2024-08-10 RX ORDER — POLYETHYLENE GLYCOL 3350 17 G/17G
17 POWDER, FOR SOLUTION ORAL DAILY
COMMUNITY

## 2024-08-10 RX ORDER — MIRTAZAPINE 7.5 MG/1
7.5 TABLET, FILM COATED ORAL NIGHTLY
COMMUNITY
Start: 2024-07-26

## 2024-08-10 RX ORDER — FAMOTIDINE 20 MG/1
20 TABLET, FILM COATED ORAL DAILY
Status: DISCONTINUED | OUTPATIENT
Start: 2024-08-11 | End: 2024-08-14 | Stop reason: HOSPADM

## 2024-08-10 RX ORDER — LACTULOSE 10 G/15ML
20 SOLUTION ORAL; RECTAL
COMMUNITY

## 2024-08-10 RX ORDER — NITROGLYCERIN 0.4 MG/1
0.4 TABLET SUBLINGUAL EVERY 5 MIN PRN
COMMUNITY

## 2024-08-10 RX ORDER — MINOCYCLINE HYDROCHLORIDE 100 MG/1
100 CAPSULE ORAL 2 TIMES DAILY
COMMUNITY
Start: 2024-08-02 | End: 2024-08-16

## 2024-08-10 RX ORDER — GENTAMICIN SULFATE 1 MG/G
OINTMENT TOPICAL DAILY
Status: ON HOLD | COMMUNITY
Start: 2024-07-21 | End: 2024-08-14 | Stop reason: HOSPADM

## 2024-08-10 RX ORDER — MELATONIN 3 MG
9 CAPSULE ORAL NIGHTLY
COMMUNITY

## 2024-08-10 RX ORDER — INDOMETHACIN 25 MG/1
50 CAPSULE ORAL
Status: COMPLETED | OUTPATIENT
Start: 2024-08-10 | End: 2024-08-10

## 2024-08-10 RX ORDER — ACETAMINOPHEN 500 MG
1000 TABLET ORAL
Status: COMPLETED | OUTPATIENT
Start: 2024-08-10 | End: 2024-08-10

## 2024-08-10 RX ORDER — ACETAMINOPHEN 325 MG/1
650 TABLET ORAL EVERY 6 HOURS PRN
COMMUNITY
Start: 2024-04-04 | End: 2025-04-04

## 2024-08-10 RX ORDER — MIRTAZAPINE 7.5 MG/1
7.5 TABLET, FILM COATED ORAL NIGHTLY
Status: DISCONTINUED | OUTPATIENT
Start: 2024-08-10 | End: 2024-08-14 | Stop reason: HOSPADM

## 2024-08-10 RX ORDER — POLYETHYLENE GLYCOL 3350 17 G/17G
17 POWDER, FOR SOLUTION ORAL DAILY
Status: DISCONTINUED | OUTPATIENT
Start: 2024-08-11 | End: 2024-08-14 | Stop reason: HOSPADM

## 2024-08-10 RX ORDER — BUSPIRONE HYDROCHLORIDE 5 MG/1
5 TABLET ORAL 2 TIMES DAILY
COMMUNITY
Start: 2024-07-21

## 2024-08-10 RX ORDER — BUPROPION HYDROCHLORIDE 100 MG/1
100 TABLET ORAL NIGHTLY
COMMUNITY
Start: 2024-07-26

## 2024-08-10 RX ORDER — BUSPIRONE HYDROCHLORIDE 5 MG/1
5 TABLET ORAL DAILY
Status: DISCONTINUED | OUTPATIENT
Start: 2024-08-11 | End: 2024-08-14 | Stop reason: HOSPADM

## 2024-08-10 RX ORDER — SODIUM CHLORIDE 9 MG/ML
INJECTION, SOLUTION INTRAVENOUS
Status: DISCONTINUED | OUTPATIENT
Start: 2024-08-10 | End: 2024-08-14 | Stop reason: HOSPADM

## 2024-08-10 RX ORDER — SEVELAMER CARBONATE 800 MG/1
1600 TABLET, FILM COATED ORAL
Status: DISCONTINUED | OUTPATIENT
Start: 2024-08-11 | End: 2024-08-14 | Stop reason: HOSPADM

## 2024-08-10 RX ORDER — OXYCODONE HYDROCHLORIDE AND ACETAMINOPHEN 10; 325 MG/1; MG/1
2 TABLET ORAL 3 TIMES DAILY
COMMUNITY
Start: 2024-07-19

## 2024-08-10 RX ORDER — METOPROLOL TARTRATE 1 MG/ML
5 INJECTION, SOLUTION INTRAVENOUS
Status: COMPLETED | OUTPATIENT
Start: 2024-08-10 | End: 2024-08-10

## 2024-08-10 RX ORDER — BUPROPION HYDROCHLORIDE 100 MG/1
100 TABLET ORAL NIGHTLY
Status: DISCONTINUED | OUTPATIENT
Start: 2024-08-10 | End: 2024-08-14 | Stop reason: HOSPADM

## 2024-08-10 RX ORDER — MORPHINE SULFATE 4 MG/ML
4 INJECTION, SOLUTION INTRAMUSCULAR; INTRAVENOUS
Status: COMPLETED | OUTPATIENT
Start: 2024-08-10 | End: 2024-08-10

## 2024-08-10 RX ORDER — CALCIUM GLUCONATE 20 MG/ML
1 INJECTION, SOLUTION INTRAVENOUS ONCE
Status: COMPLETED | OUTPATIENT
Start: 2024-08-10 | End: 2024-08-10

## 2024-08-10 RX ORDER — OXYCODONE AND ACETAMINOPHEN 10; 325 MG/1; MG/1
1 TABLET ORAL EVERY 6 HOURS PRN
Status: DISCONTINUED | OUTPATIENT
Start: 2024-08-10 | End: 2024-08-14 | Stop reason: HOSPADM

## 2024-08-10 RX ORDER — SEVELAMER CARBONATE FOR ORAL SUSPENSION 800 MG/1
0.8 POWDER, FOR SUSPENSION ORAL 3 TIMES DAILY
COMMUNITY
Start: 2024-07-05

## 2024-08-10 RX ORDER — METOPROLOL TARTRATE 1 MG/ML
5 INJECTION, SOLUTION INTRAVENOUS
Status: DISCONTINUED | OUTPATIENT
Start: 2024-08-10 | End: 2024-08-11

## 2024-08-10 RX ORDER — CALCIUM GLUCONATE 20 MG/ML
1 INJECTION, SOLUTION INTRAVENOUS EVERY 10 MIN PRN
Status: DISCONTINUED | OUTPATIENT
Start: 2024-08-10 | End: 2024-08-14 | Stop reason: HOSPADM

## 2024-08-10 RX ADMIN — MIRTAZAPINE 7.5 MG: 7.5 TABLET, FILM COATED ORAL at 10:08

## 2024-08-10 RX ADMIN — VANCOMYCIN HYDROCHLORIDE 1500 MG: 500 INJECTION, POWDER, LYOPHILIZED, FOR SOLUTION INTRAVENOUS at 08:08

## 2024-08-10 RX ADMIN — BUPROPION HYDROCHLORIDE 100 MG: 100 TABLET, FILM COATED ORAL at 10:08

## 2024-08-10 RX ADMIN — SODIUM BICARBONATE 50 MEQ: 84 INJECTION, SOLUTION INTRAVENOUS at 06:08

## 2024-08-10 RX ADMIN — ACETAMINOPHEN 1000 MG: 500 TABLET ORAL at 02:08

## 2024-08-10 RX ADMIN — DEXTROSE MONOHYDRATE 50 G: 25 INJECTION, SOLUTION INTRAVENOUS at 06:08

## 2024-08-10 RX ADMIN — HUMAN INSULIN 6.8 UNITS: 100 INJECTION, SOLUTION SUBCUTANEOUS at 06:08

## 2024-08-10 RX ADMIN — MORPHINE SULFATE 4 MG: 4 INJECTION, SOLUTION INTRAMUSCULAR; INTRAVENOUS at 07:08

## 2024-08-10 RX ADMIN — CEFTRIAXONE SODIUM 1 G: 1 INJECTION, POWDER, FOR SOLUTION INTRAMUSCULAR; INTRAVENOUS at 06:08

## 2024-08-10 RX ADMIN — MUPIROCIN: 20 OINTMENT TOPICAL at 10:08

## 2024-08-10 RX ADMIN — CALCIUM GLUCONATE 1 G: 20 INJECTION, SOLUTION INTRAVENOUS at 06:08

## 2024-08-10 RX ADMIN — PROMETHAZINE HYDROCHLORIDE 12.5 MG: 25 INJECTION INTRAMUSCULAR; INTRAVENOUS at 07:08

## 2024-08-10 RX ADMIN — METOPROLOL TARTRATE 5 MG: 1 INJECTION, SOLUTION INTRAVENOUS at 06:08

## 2024-08-10 NOTE — ED PROVIDER NOTES
"Encounter Date: 8/10/2024       History     Chief Complaint   Patient presents with    Fever     Patient presents to the ED from Dialysis with c/o fever. Patient was diagnosed with COVID one week ago     33 y/o AAF presents to the emergency department via EMS from dialysis with c/o fever. Patient states she was diagnosed with COVID a week ago and has not been able to have dialysis since that time due to her diagnosis. She states she did begin feeling worse today. She has had no vomiting or diarrhea. She has a "dry", unproductive cough. Temp at dialysis was reportedly 103. She has had nothing for her symptoms.     The history is provided by the patient.     Review of patient's allergies indicates:   Allergen Reactions    Iodine Swelling     Causes swelling, itching , and nausea.    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 MD    DEBRIDEMENT OF LOWER EXTREMITY Bilateral 4/18/2024    Procedure: DEBRIDEMENT, LOWER EXTREMITY;  Surgeon: Henok Gage DO;  Location: [facility] OR;  Service: General;  Laterality: Bilateral;    DEBRIDEMENT OF LOWER EXTREMITY Bilateral 4/24/2024    Procedure: DEBRIDEMENT, LOWER EXTREMITY;  Surgeon: Henok Gage DO;  Location: [facility] OR;  Service: General;  Laterality: Bilateral;    DEBRIDEMENT OF SACRAL WOUND N/A 4/18/2024    Procedure: " DEBRIDEMENT, WOUND, SACRUM;  Surgeon: Henok Gage DO;  Location: [facility] OR;  Service: General;  Laterality: N/A;    DEBRIDEMENT OF SACRAL WOUND N/A 4/24/2024    Procedure: DEBRIDEMENT, WOUND, SACRUM;  Surgeon: Henok Gage DO;  Location: [facility] OR;  Service: General;  Laterality: N/A;    DIALYSIS FISTULA CREATION Left 06/02/2022    Upper extremity by Ben Hager MD    TUBAL LIGATION       Family History   Problem Relation Name Age of Onset    Hypertension Mother      Hypertension Father      Diabetes Daughter      Cancer Maternal Aunt          breast cancer     Social History     Tobacco Use    Smoking status: Former     Current packs/day: 1.00     Average packs/day: 1 pack/day for 4.3 years (4.3 ttl pk-yrs)     Types: Cigarettes     Start date: 5/6/2020     Quit date: 5/6/2015    Smokeless tobacco: Never   Substance Use Topics    Alcohol use: Never    Drug use: Never     Review of Systems   All other systems reviewed and are negative.      Physical Exam     Initial Vitals [08/10/24 1407]   BP Pulse Resp Temp SpO2   123/80 (!) 129 (!) 25 (!) 102.8 °F (39.3 °C) 100 %      MAP       --         Physical Exam    Constitutional: She appears well-developed. She is cooperative.   Cardiovascular:  Regular rhythm and normal heart sounds.   Tachycardia present.         Pulmonary/Chest: Effort normal and breath sounds normal.   Abdominal: Abdomen is soft. Bowel sounds are normal. There is no abdominal tenderness.     Neurological: She is alert and oriented to person, place, and time.   Skin: Skin is warm, dry and intact. Capillary refill takes less than 2 seconds.         Medical Screening Exam   See Full Note    ED Course   Procedures  Labs Reviewed   COMPREHENSIVE METABOLIC PANEL - Abnormal       Result Value    Sodium 133 (*)     Potassium 8.0 (*)     Chloride 104      CO2 19 (*)     Anion Gap 18 (*)     Glucose 92       (*)     Creatinine 9.78 (*)     BUN/Creatinine Ratio 11      Calcium 8.4 (*)      Total Protein 9.3 (*)     Albumin 2.3 (*)     Globulin 7.0 (*)     A/G Ratio 0.3      Bilirubin, Total 0.9      Alk Phos 124 (*)     ALT 8 (*)     AST 15      eGFR 5 (*)    URINALYSIS, REFLEX TO URINE CULTURE - Abnormal    Color, UA Yellow      Clarity, UA Turbid      pH, UA 7.5      Leukocytes, UA Trace (*)     Nitrites, UA Negative      Protein,  (*)     Glucose, UA Normal      Ketones, UA Negative      Urobilinogen, UA Normal      Bilirubin, UA Negative      Blood, UA Trace (*)     Specific [place], UA 1.013     PHOSPHORUS - Abnormal    Phosphorus 6.5 (*)    CBC WITH DIFFERENTIAL - Abnormal    WBC 14.61 (*)     RBC 3.14 (*)     Hemoglobin 8.6 (*)     Hematocrit 28.5 (*)     MCV 90.8      MCH 27.4      MCHC 30.2 (*)     RDW 20.2 (*)     Platelet Count 253      MPV 9.9      Neutrophils % 87.6 (*)     Lymphocytes % 7.4 (*)     Monocytes % 3.7      Eosinophils % 0.1 (*)     Basophils % 0.3      Immature Granulocytes % 0.9 (*)     nRBC, Auto 0.0      Neutrophils, Abs 12.80 (*)     Lymphocytes, Absolute 1.08      Monocytes, Absolute 0.54      Eosinophils, Absolute 0.02      Basophils, Absolute 0.04      Immature Granulocytes, Absolute 0.13 (*)     nRBC, Absolute 0.00      Diff Type Manual     MANUAL DIFFERENTIAL - Abnormal    Segmented Neutrophils, Man % 73 (*)     Bands, Man % 16 (*)     Lymphocytes, Man % 6 (*)     Monocytes, Man % 5      Platelet Morphology Normal      Anisocytosis 2+      Target Cells Few      Crenated Cells Few      Ovalocytes Few      Polychromasia Few      Hypochromic Few      Schistocytes Few      Toxic Granulation Few      Dohle Bodies Few     URINALYSIS, MICROSCOPIC - Abnormal    WBC, UA 12 (*)     RBC, UA 2      Bacteria, UA Occasional (*)     Squamous Epithelial Cells, UA Few (*)    LACTIC ACID, PLASMA - Normal    Lactic Acid 1.5     MAGNESIUM - Normal    Magnesium 2.1     CULTURE, BLOOD   CULTURE, BLOOD   CULTURE, URINE   CBC W/ AUTO DIFFERENTIAL    Narrative:     The following  orders were created for panel order CBC auto differential.  Procedure                               Abnormality         Status                     ---------                               -----------         ------                     CBC with Differential[[id number]]       Abnormal            Final result               Manual Differential[[id number]]         Abnormal            Final result                 Please view results for these tests on the individual orders.   POCT GLUCOSE MONITORING CONTINUOUS    POC Glucose 97     POCT GLUCOSE MONITORING CONTINUOUS    POC Glucose 89     POCT GLUCOSE MONITORING CONTINUOUS          Imaging Results              X-Ray Chest AP Portable (Final result)  Result time 08/10/24 15:25:47      Final result by Phillip Fu II, MD (08/10/24 15:25:47)                   Impression:      No evidence of acute cardiopulmonary disease.      Electronically signed by: Phillip Fu  Date:    08/10/2024  Time:    15:25               Narrative:    EXAMINATION:  XR CHEST AP PORTABLE    CLINICAL HISTORY:  Sepsis;    COMPARISON:  10 Elaine 2024    TECHNIQUE:  XR CHEST AP PORTABLE    FINDINGS:  The heart and mediastinum are normal in size and configuration.  The pulmonary vascularity is normal in caliber.  No lung infiltrates, effusions, pneumothorax or other abnormality is demonstrated.                                       Medications   calcium gluconate 1 g in NS IVPB (premixed) (0 g Intravenous Stopped 8/10/24 1836)     And   calcium gluconate 1 g in NS IVPB (premixed) (has no administration in time range)   dextrose 50% injection 50 g (50 g Intravenous Given 8/10/24 1805)     And   dextrose 50% injection 25 g (has no administration in time range)     And   insulin regular injection 6.8 Units 0.068 mL (6.8 Units Intravenous Given 8/10/24 1824)   metoprolol injection 5 mg (5 mg Intravenous Not Given 8/10/24 1845)   ceFEPIme (MAXIPIME) 1 g in D5W 100 mL IVPB (MB+) (has no administration in  "time range)   vancomycin (VANCOCIN) 1,500 mg in D5W 250 mL IVPB (1,500 mg Intravenous New Bag 8/10/24 2004)   vancomycin - pharmacy to dose (has no administration in time range)   acetaminophen tablet 1,000 mg (1,000 mg Oral Given 8/10/24 1415)   cefTRIAXone (Rocephin) 1 g in D5W 100 mL IVPB (MB+) ( Intravenous Verify Only 8/10/24 1911)   sodium bicarbonate solution 50 mEq (50 mEq Intravenous Given 8/10/24 1813)   metoprolol injection 5 mg (5 mg Intravenous Given 8/10/24 1819)   morphine injection 4 mg (4 mg Intravenous Given 8/10/24 1951)   promethazine (PHENERGAN) 12.5 mg in 0.9% NaCl 50 mL IVPB (12.5 mg Intravenous New Bag 8/10/24 1932)     Medical Decision Making  33 y/o AAF presents to the emergency department via EMS from dialysis with c/o fever. Patient states she was diagnosed with COVID a week ago and has not been able to have dialysis since that time due to her diagnosis. She states she did begin feeling worse today. She has had no vomiting or diarrhea. She has a "dry", unproductive cough. Temp at dialysis was reportedly 103. She has had nothing for her symptoms.     Problems Addressed:  Fever:     Details: Improved with Tylenol. Blood and urine cultures pending. Abx given.   Hyperkalemia:     Details: Treated with David's cocktail. Spoke with nephrologist, plans to send dialysis nurse to dialyze patient tonight.  Sepsis, due to unspecified organism, unspecified whether acute organ dysfunction present:     Details: Sepsis protocol initiated. Cultures obtained, abx given. I did not bolus patient with IVF as she has not been dialyzed in a week and I did not want to overload her. She has not been hypotensive. Lactic acid was normal. HR improved with treatment of fever and 1x dose of Lopressor.     Amount and/or Complexity of Data Reviewed  Labs: ordered.  Radiology: ordered.  Discussion of management or test interpretation with external provider(s): Patient discussed with hospitalist on call, Dr. Duggan. " Will admit to ICU. Pt discussed with Dr. Urban, cardiologist on call, recommends treating potassium which we have done and repeat EKG improved, recommends dialysis. Patient also discussed with Dr. Lerma, nephrologist on call, plans to send dialysis nurse to dialyze patient tonight.     Risk  OTC drugs.  Prescription drug management.                                      Clinical Impression:   Final diagnoses:  [R00.0] Tachycardia  [R50.9] Fever  [N39.0] Urinary tract infection without hematuria, site unspecified (Primary)  [E87.5] Hyperkalemia  [N18.6, Z99.2] ESRD (end stage renal disease) on dialysis  [A41.9] Sepsis, due to unspecified organism, unspecified whether acute organ dysfunction present        ED Disposition Condition    Admit                 Tea Loaiza FNP  08/10/24 2036

## 2024-08-11 PROBLEM — U07.1 COVID-19: Status: ACTIVE | Noted: 2024-08-11

## 2024-08-11 LAB
ANION GAP SERPL CALCULATED.3IONS-SCNC: 12 MMOL/L (ref 7–16)
APTT PPP: 42 SECONDS (ref 25.2–37.3)
BUN SERPL-MCNC: 56 MG/DL (ref 7–18)
BUN/CREAT SERPL: 9 (ref 6–20)
CALCIUM SERPL-MCNC: 8.2 MG/DL (ref 8.5–10.1)
CHLORIDE SERPL-SCNC: 101 MMOL/L (ref 98–107)
CO2 SERPL-SCNC: 25 MMOL/L (ref 21–32)
CREAT SERPL-MCNC: 6.11 MG/DL (ref 0.55–1.02)
CRP SERPL-MCNC: 22.4 MG/DL (ref 0–0.8)
EGFR (NO RACE VARIABLE) (RUSH/TITUS): 9 ML/MIN/1.73M2
ERYTHROCYTE [SEDIMENTATION RATE] IN BLOOD BY WESTERGREN METHOD: 85 MM/HR (ref 0–20)
GLUCOSE SERPL-MCNC: 114 MG/DL (ref 70–105)
GLUCOSE SERPL-MCNC: 123 MG/DL (ref 74–106)
GLUCOSE SERPL-MCNC: 91 MG/DL (ref 70–105)
HCG SERUM QUALITATIVE: NEGATIVE
INFLUENZA A MOLECULAR (OHS): NEGATIVE
INFLUENZA B MOLECULAR (OHS): NEGATIVE
INR BLD: 1.78
MAGNESIUM SERPL-MCNC: 1.9 MG/DL (ref 1.7–2.3)
PHOSPHATE SERPL-MCNC: 5 MG/DL (ref 2.5–4.5)
POTASSIUM SERPL-SCNC: 5.1 MMOL/L (ref 3.5–5.1)
PREALB SERPL NEPH-MCNC: 9 MG/DL (ref 20–40)
PROTHROMBIN TIME: 20.5 SECONDS (ref 11.7–14.7)
SARS-COV-2 RDRP RESP QL NAA+PROBE: POSITIVE
SODIUM SERPL-SCNC: 133 MMOL/L (ref 136–145)

## 2024-08-11 PROCEDURE — 63600175 PHARM REV CODE 636 W HCPCS: Performed by: HOSPITALIST

## 2024-08-11 PROCEDURE — 25000003 PHARM REV CODE 250: Performed by: HOSPITALIST

## 2024-08-11 PROCEDURE — 99291 CRITICAL CARE FIRST HOUR: CPT | Mod: ,,, | Performed by: NURSE PRACTITIONER

## 2024-08-11 PROCEDURE — 99900035 HC TECH TIME PER 15 MIN (STAT)

## 2024-08-11 PROCEDURE — 87635 SARS-COV-2 COVID-19 AMP PRB: CPT | Performed by: NURSE PRACTITIONER

## 2024-08-11 PROCEDURE — 25000003 PHARM REV CODE 250: Performed by: INTERNAL MEDICINE

## 2024-08-11 PROCEDURE — 85025 COMPLETE CBC W/AUTO DIFF WBC: CPT | Performed by: HOSPITALIST

## 2024-08-11 PROCEDURE — 85610 PROTHROMBIN TIME: CPT | Performed by: HOSPITALIST

## 2024-08-11 PROCEDURE — 83735 ASSAY OF MAGNESIUM: CPT | Performed by: HOSPITALIST

## 2024-08-11 PROCEDURE — 27000207 HC ISOLATION

## 2024-08-11 PROCEDURE — 84134 ASSAY OF PREALBUMIN: CPT | Performed by: HOSPITALIST

## 2024-08-11 PROCEDURE — 84100 ASSAY OF PHOSPHORUS: CPT | Performed by: HOSPITALIST

## 2024-08-11 PROCEDURE — 63600175 PHARM REV CODE 636 W HCPCS: Performed by: NURSE PRACTITIONER

## 2024-08-11 PROCEDURE — 36415 COLL VENOUS BLD VENIPUNCTURE: CPT | Performed by: HOSPITALIST

## 2024-08-11 PROCEDURE — 82962 GLUCOSE BLOOD TEST: CPT

## 2024-08-11 PROCEDURE — 80048 BASIC METABOLIC PNL TOTAL CA: CPT | Performed by: HOSPITALIST

## 2024-08-11 PROCEDURE — 25000003 PHARM REV CODE 250: Performed by: NURSE PRACTITIONER

## 2024-08-11 PROCEDURE — 85730 THROMBOPLASTIN TIME PARTIAL: CPT | Performed by: HOSPITALIST

## 2024-08-11 PROCEDURE — 11000001 HC ACUTE MED/SURG PRIVATE ROOM

## 2024-08-11 PROCEDURE — 87502 INFLUENZA DNA AMP PROBE: CPT | Performed by: NURSE PRACTITIONER

## 2024-08-11 PROCEDURE — 94761 N-INVAS EAR/PLS OXIMETRY MLT: CPT

## 2024-08-11 PROCEDURE — 84703 CHORIONIC GONADOTROPIN ASSAY: CPT | Performed by: HOSPITALIST

## 2024-08-11 PROCEDURE — 86140 C-REACTIVE PROTEIN: CPT | Performed by: HOSPITALIST

## 2024-08-11 PROCEDURE — 85651 RBC SED RATE NONAUTOMATED: CPT | Performed by: HOSPITALIST

## 2024-08-11 RX ORDER — HEPARIN SODIUM 5000 [USP'U]/ML
5000 INJECTION, SOLUTION INTRAVENOUS; SUBCUTANEOUS EVERY 8 HOURS
Status: DISCONTINUED | OUTPATIENT
Start: 2024-08-11 | End: 2024-08-14 | Stop reason: HOSPADM

## 2024-08-11 RX ORDER — MORPHINE SULFATE 4 MG/ML
4 INJECTION, SOLUTION INTRAMUSCULAR; INTRAVENOUS EVERY 4 HOURS PRN
Status: DISCONTINUED | OUTPATIENT
Start: 2024-08-11 | End: 2024-08-14 | Stop reason: HOSPADM

## 2024-08-11 RX ORDER — ASPIRIN 81 MG/1
81 TABLET ORAL DAILY
Status: DISCONTINUED | OUTPATIENT
Start: 2024-08-11 | End: 2024-08-11

## 2024-08-11 RX ADMIN — HEPARIN SODIUM 5000 UNITS: 5000 INJECTION, SOLUTION INTRAVENOUS; SUBCUTANEOUS at 05:08

## 2024-08-11 RX ADMIN — FAMOTIDINE 20 MG: 20 TABLET, FILM COATED ORAL at 09:08

## 2024-08-11 RX ADMIN — SEVELAMER CARBONATE 1600 MG: 800 TABLET, FILM COATED ORAL at 12:08

## 2024-08-11 RX ADMIN — HEPARIN SODIUM 5000 UNITS: 5000 INJECTION, SOLUTION INTRAVENOUS; SUBCUTANEOUS at 02:08

## 2024-08-11 RX ADMIN — POLYETHYLENE GLYCOL 3350 17 G: 17 POWDER, FOR SOLUTION ORAL at 09:08

## 2024-08-11 RX ADMIN — MORPHINE SULFATE 4 MG: 4 INJECTION, SOLUTION INTRAMUSCULAR; INTRAVENOUS at 08:08

## 2024-08-11 RX ADMIN — MIRTAZAPINE 7.5 MG: 7.5 TABLET, FILM COATED ORAL at 08:08

## 2024-08-11 RX ADMIN — SEVELAMER CARBONATE 1600 MG: 800 TABLET, FILM COATED ORAL at 09:08

## 2024-08-11 RX ADMIN — MUPIROCIN: 20 OINTMENT TOPICAL at 08:08

## 2024-08-11 RX ADMIN — OXYCODONE AND ACETAMINOPHEN 1 TABLET: 10; 325 TABLET ORAL at 03:08

## 2024-08-11 RX ADMIN — CEFEPIME 1 G: 1 INJECTION, POWDER, FOR SOLUTION INTRAMUSCULAR; INTRAVENOUS at 12:08

## 2024-08-11 RX ADMIN — MUPIROCIN: 20 OINTMENT TOPICAL at 09:08

## 2024-08-11 RX ADMIN — CEFEPIME 1 G: 1 INJECTION, POWDER, FOR SOLUTION INTRAMUSCULAR; INTRAVENOUS at 11:08

## 2024-08-11 RX ADMIN — BUSPIRONE HYDROCHLORIDE 5 MG: 5 TABLET ORAL at 09:08

## 2024-08-11 RX ADMIN — MORPHINE SULFATE 4 MG: 4 INJECTION, SOLUTION INTRAMUSCULAR; INTRAVENOUS at 12:08

## 2024-08-11 RX ADMIN — OXYCODONE AND ACETAMINOPHEN 1 TABLET: 10; 325 TABLET ORAL at 09:08

## 2024-08-11 RX ADMIN — HEPARIN SODIUM 5000 UNITS: 5000 INJECTION, SOLUTION INTRAVENOUS; SUBCUTANEOUS at 09:08

## 2024-08-11 RX ADMIN — BUPROPION HYDROCHLORIDE 100 MG: 100 TABLET, FILM COATED ORAL at 08:08

## 2024-08-11 RX ADMIN — SEVELAMER CARBONATE 1600 MG: 800 TABLET, FILM COATED ORAL at 03:08

## 2024-08-11 NOTE — ED NOTES
Dressing changed, drew NP assessed wounds, pictures obtained and updated in EMR, pt alert and oriented, more awake and communicates appropriately

## 2024-08-11 NOTE — SUBJECTIVE & OBJECTIVE
Past Medical History:   Diagnosis Date    Anemia in chronic kidney disease     Asthma     Atypical anxiety disorder     Calciphylaxis     severe failed 8 weeks of thiosulfate tx in [place]   see dc summary    Encounter for blood transfusion     multiple    ESRD (end stage renal disease) on dialysis     HD   TTS    GERD (gastroesophageal reflux disease)     Severe protein-calorie malnutrition     SLE (systemic lupus erythematosus)        Past Surgical History:   Procedure Laterality Date    AV FISTULA PLACEMENT Left 03/08/2023    with Banding of left AV fistula: Surgeon Rodrigue Lowery Jr. MD    DEBRIDEMENT OF LOWER EXTREMITY Bilateral 4/18/2024    Procedure: DEBRIDEMENT, LOWER EXTREMITY;  Surgeon: Henok Gage DO;  Location: [facility] OR;  Service: General;  Laterality: Bilateral;    DEBRIDEMENT OF LOWER EXTREMITY Bilateral 4/24/2024    Procedure: DEBRIDEMENT, LOWER EXTREMITY;  Surgeon: Henok Gage DO;  Location: [facility] OR;  Service: General;  Laterality: Bilateral;    DEBRIDEMENT OF SACRAL WOUND N/A 4/18/2024    Procedure: DEBRIDEMENT, WOUND, SACRUM;  Surgeon: Henok Gage DO;  Location: [facility] OR;  Service: General;  Laterality: N/A;    DEBRIDEMENT OF SACRAL WOUND N/A 4/24/2024    Procedure: DEBRIDEMENT, WOUND, SACRUM;  Surgeon: Henok Gage DO;  Location: [facility] OR;  Service: General;  Laterality: N/A;    DIALYSIS FISTULA CREATION Left 06/02/2022    Upper extremity by Ben Hager MD    TUBAL LIGATION         Review of patient's allergies indicates:   Allergen Reactions    Iodine Swelling     Causes swelling, itching , and nausea.    Strawberry Anaphylaxis, Swelling and Other (See Comments)       No current facility-administered medications on file prior to encounter.     Current Outpatient Medications on File Prior to Encounter   Medication Sig    acetaminophen (TYLENOL) 325 MG tablet Take 650 mg by mouth every 6 (six) hours as needed.    albuterol (PROVENTIL) 2.5 mg /3  mL (0.083 %) nebulizer solution Inhale 2.5 mg into the lungs every 6 (six) hours as needed.    buPROPion (WELLBUTRIN) 100 MG tablet Take 100 mg by mouth every evening.    busPIRone (BUSPAR) 5 MG Tab Take 5 mg by mouth 2 (two) times daily.    carvediloL (COREG) 25 MG tablet Take 1 tablet by mouth 2 (two) times daily with meals.    famotidine (PEPCID) 20 MG tablet Take 20 mg by mouth once daily.    gentamicin (GARAMYCIN) 0.1 % ointment Apply topically once daily.    hydrALAZINE (APRESOLINE) 100 MG tablet Take 100 mg by mouth every 8 (eight) hours.    isosorbide mononitrate (IMDUR) 30 MG 24 hr tablet Take 30 mg by mouth once daily.    lactulose (CHRONULAC) 10 gram/15 mL solution Take 20 g by mouth. Every day on Monday, Wednesday, Friday, and Sunday    melatonin 3 mg Cap Take 9 mg by mouth every evening.    minocycline (MINOCIN,DYNACIN) 100 MG capsule Take 100 mg by mouth 2 (two) times daily.    mirtazapine (REMERON) 7.5 MG Tab Take 7.5 mg by mouth every evening.    ondansetron (ZOFRAN-ODT) 4 MG TbDL Take 4 mg by mouth every 8 (eight) hours as needed.    PERCOCET  mg per tablet Take 1 tablet by mouth every 6 (six) hours as needed.    polyethylene glycol (GLYCOLAX) 17 gram PwPk Take 17 g by mouth once daily.    sevelamer carbonate (RENVELA) 0.8 gram PwPk Take 0.8 g by mouth 3 (three) times daily.    sodium hypochlorite (DAKINS) external solution Apply topically once daily.    nitroGLYCERIN (NITROSTAT) 0.4 MG SL tablet Place 0.4 mg under the tongue every 5 (five) minutes as needed for Chest pain.     Family History       Problem Relation (Age of Onset)    Cancer Maternal Aunt    Diabetes Daughter    Hypertension Mother, Father          Tobacco Use    Smoking status: Former     Current packs/day: 1.00     Average packs/day: 1 pack/day for 4.3 years (4.3 ttl pk-yrs)     Types: Cigarettes     Start date: 5/6/2020     Quit date: 5/6/2015    Smokeless tobacco: Never   Substance and Sexual Activity    Alcohol use: Never     Drug use: Never    Sexual activity: Not Currently     Review of Systems   Constitutional:  Positive for chills, fatigue and fever. Negative for appetite change and unexpected weight change.   HENT:  Negative for congestion, mouth sores, nosebleeds, sinus pain, sore throat and trouble swallowing.    Respiratory:  Negative for apnea, cough, chest tightness and shortness of breath.    Cardiovascular:  Negative for chest pain, palpitations and leg swelling.   Gastrointestinal:  Negative for abdominal pain, blood in stool, constipation, diarrhea, nausea and vomiting.   Genitourinary:  Negative for decreased urine volume, difficulty urinating, dysuria and frequency.   Musculoskeletal:  Negative for arthralgias, back pain and neck pain.   Skin:  Negative for rash.   Neurological:  Negative for syncope, light-headedness and headaches.   Hematological:  Does not bruise/bleed easily.   Psychiatric/Behavioral:  Positive for confusion. Negative for agitation and suicidal ideas.      Objective:     Vital Signs (Most Recent):  Temp: 99.2 °F (37.3 °C) (08/11/24 0245)  Pulse: 110 (08/11/24 0130)  Resp: (!) 22 (08/11/24 0314)  BP: (!) 133/97 (08/11/24 0130)  SpO2: 100 % (08/11/24 0130) Vital Signs (24h Range):  Temp:  [99.2 °F (37.3 °C)-102.8 °F (39.3 °C)] 99.2 °F (37.3 °C)  Pulse:  [] 110  Resp:  [14-25] 22  SpO2:  [93 %-100 %] 100 %  BP: (109-133)/(68-97) 133/97     Weight: 68 kg (150 lb)  Body mass index is 22.15 kg/m².     Physical Exam  Vitals and nursing note reviewed. Exam conducted with a chaperone present.   Constitutional:       General: She is not in acute distress.     Appearance: She is ill-appearing. She is not toxic-appearing.   HENT:      Head: Atraumatic.      Mouth/Throat:      Mouth: Mucous membranes are moist.      Pharynx: Oropharynx is clear.   Eyes:      Conjunctiva/sclera: Conjunctivae normal.      Pupils: Pupils are equal, round, and reactive to light.   Cardiovascular:      Rate and Rhythm: Regular  rhythm. Tachycardia present.      Pulses: Normal pulses.      Heart sounds: Normal heart sounds.   Pulmonary:      Effort: Pulmonary effort is normal.      Breath sounds: Normal breath sounds.   Abdominal:      General: Abdomen is flat. Bowel sounds are normal.      Tenderness: There is no abdominal tenderness.      Comments: Firm     Musculoskeletal:         General: Normal range of motion.      Cervical back: Neck supple.      Right lower leg: Edema present.      Left lower leg: Edema present.   Skin:     General: Skin is warm and dry.      Coloration: Skin is not jaundiced or pale.      Findings: Lesion and wound present. No bruising or rash.   Neurological:      Mental Status: She is oriented to person, place, and time. Mental status is at baseline. She is lethargic.      Motor: Weakness present.   Psychiatric:         Mood and Affect: Mood normal.              CRANIAL NERVES     CN III, IV, VI   Pupils are equal, round, and reactive to light.       Significant Labs: All pertinent labs within the past 24 hours have been reviewed.    Significant Imaging: I have reviewed all pertinent imaging results/findings within the past 24 hours.

## 2024-08-11 NOTE — ASSESSMENT & PLAN NOTE
Creatine stable for now. BMP reviewed- noted Estimated Creatinine Clearance: 8.5 mL/min (A) (based on SCr of 9.78 mg/dL (H)). according to latest data. Based on current GFR, CKD stage is end stage.  Monitor UOP and serial BMP and adjust therapy as needed. Renally dose meds. Avoid nephrotoxic medications and procedures.    Appreciate nephrology's assistance

## 2024-08-11 NOTE — ASSESSMENT & PLAN NOTE
Nutrition consulted. Most recent weight and BMI monitored-     Measurements:  Wt Readings from Last 1 Encounters:   08/10/24 68 kg (150 lb)   Body mass index is 22.15 kg/m².    Patient has been screened and assessed by RD.    Malnutrition Type:  Context:    Level:      Malnutrition Characteristic Summary:       Interventions/Recommendations (treatment strategy):

## 2024-08-11 NOTE — HPI
35 yo F (well known to me from previous long hospital course for her wounds from severe caliphylaxis) presents to [facility] ED from HD with F/C.  Temp was 103.  WBC  14 but lactic acid 1.5.  She has 16 bands and her K is 8 with EKG showing tachycardia with low voltage widened QRS.  Nephrology consulted and came to ED for emergent HD.  Patient was diagnosed with COVID last week and she has not been feeling well.  UA does not look infectious.  ED NP got BC x2 before giving rocephin.  CXR shows no acute process.      Patient is a long term wound care patient and wounds appear improved from our last encounter.  I have changed to cefepime and added vanco to antibiotic regimen.  She has some confusion and lethargy at this time and will try to get more info after HD and when afebrile.  Last time when I admitted her she was very alert and oriented and we talked about her two children.  She says that she has not felt well for about a week when she got diagnosed with COVID but denies N/V/D.  She normally is able to ambulate with a walker but she is so weak at this time she can only move her BLE to gravity.  BUE 4/5 but she is able to hold the remote control and the juice cup.  She is hungry at this time and is feeling better after HD.  She has not been for her past three HD visits and was sent directly to the ED from HD today with AMS and fever.      Patient has a LUE AV fistula that has audible bruit and palpable thrill and is not tender or erythematous. Wounds are healing but ESR CRP pending as are blood cultures and will continue to cover with cefepime and vanco until cultures return.      Remainder of ROS as below.  See assessment and plan below for problem based evaluation

## 2024-08-11 NOTE — ASSESSMENT & PLAN NOTE
Nutrition consulted. Most recent weight and BMI monitored-     Measurements:  Wt Readings from Last 1 Encounters:   08/10/24 68 kg (150 lb)   Body mass index is 22.15 kg/m².    Patient has been screened and assessed by RD.    Malnutrition Type:   will check PAB and nutrition consult  Context:    Level:      Malnutrition Characteristic Summary:       Interventions/Recommendations (treatment strategy):

## 2024-08-11 NOTE — PLAN OF CARE
Problem: Adult Inpatient Plan of Care  Goal: Optimal Comfort and Wellbeing  Outcome: Progressing  Intervention: Monitor Pain and Promote Comfort  Flowsheets (Taken 8/11/2024 0450)  Pain Management Interventions:   pain management plan reviewed with patient/caregiver   warm blanket provided   relaxation techniques promoted   quiet environment facilitated   pillow support provided     Problem: Wound  Goal: Absence of Infection Signs and Symptoms  Outcome: Progressing  Intervention: Prevent or Manage Infection  Flowsheets (Taken 8/11/2024 0450)  Fever Reduction/Comfort Measures: lightweight bedding  Infection Management: aseptic technique maintained

## 2024-08-11 NOTE — PROGRESS NOTES
[facility] Telemetry  Critical Care Medicine  Progress Note    Patient Name: Madhav Correa  MRN: [id number]  Admission Date: 8/10/2024  Hospital Length of Stay: 1 days  Code Status: Full Code  Attending Provider: Kennedy Irene MD  Primary Care Provider: Rehab, [facility]   Principal Problem: Sepsis    Subjective:     HPI:  No notes on file    Hospital/ICU Course:  No notes on file    Interval History/Significant Events: awake, alert, decreased appetite.     Review of Systems   Constitutional:  Positive for fatigue.   Musculoskeletal:  Positive for myalgias.     Objective:     Vital Signs (Most Recent):  Temp: 99 °F (37.2 °C) (08/11/24 1130)  Pulse: 101 (08/11/24 1215)  Resp: 15 (08/11/24 1235)  BP: 112/76 (08/11/24 1215)  SpO2: 99 % (08/11/24 1215) Vital Signs (24h Range):  Temp:  [99 °F (37.2 °C)-102.8 °F (39.3 °C)] 99 °F (37.2 °C)  Pulse:  [] 101  Resp:  [12-25] 15  SpO2:  [90 %-100 %] 99 %  BP: ()/(51-97) 112/76   Weight: 68 kg (150 lb)  Body mass index is 22.15 kg/m².      Intake/Output Summary (Last 24 hours) at 8/11/2024 1237  Last data filed at 8/10/2024 1911  Gross per 24 hour   Intake 150.22 ml   Output --   Net 150.22 ml          Physical Exam  Vitals reviewed.   HENT:      Head: Normocephalic.      Right Ear: External ear normal.      Left Ear: External ear normal.   Eyes:      Conjunctiva/sclera: Conjunctivae normal.   Cardiovascular:      Rate and Rhythm: Normal rate and regular rhythm.   Pulmonary:      Effort: Pulmonary effort is normal.      Breath sounds: Normal breath sounds.   Abdominal:      General: Bowel sounds are normal.      Palpations: Abdomen is soft.   Musculoskeletal:      Cervical back: Normal range of motion.   Skin:     Comments: Wounds to sacrum and lower ext, see media for details    Neurological:      Mental Status: She is alert. Mental status is at baseline.   Psychiatric:         Mood and Affect: Mood normal.  "           Vents:     Lines/Drains/Airways       Peripheral Intravenous Line  Duration                  Hemodialysis AV Fistula Left upper arm -- days         Peripheral IV - Single Lumen 08/10/24 1700 20 G Yes Anterior;Right Upper Arm <1 day                  Significant Labs:    CBC/Anemia Profile:  Recent Labs   Lab 08/10/24  1457   WBC 14.61*   HGB 8.6*   HCT 28.5*      MCV 90.8   RDW 20.2*        Chemistries:  Recent Labs   Lab 08/10/24  1457 08/10/24  1604 08/11/24  0537   NA  --  133* 133*   K  --  8.0* 5.1   CL  --  104 101   CO2  --  19* 25   BUN  --  103* 56*   CREATININE  --  9.78* 6.11*   CALCIUM  --  8.4* 8.2*   ALBUMIN  --  2.3*  --    PROT  --  9.3*  --    BILITOT  --  0.9  --    ALKPHOS  --  124*  --    ALT  --  8*  --    AST  --  15  --    MG 2.1  --  1.9   PHOS 6.5*  --  5.0*       All pertinent labs within the past 24 hours have been reviewed.    Significant Imaging:  I have reviewed all pertinent imaging results/findings within the past 24 hours.    ABG  No results for input(s): "PH", "PO2", "PCO2", "HCO3", "BE" in the last 168 hours.  Assessment/Plan:     Pulmonary  Asthma  Stable     Renal/  Hyperkalemia  Missed HD, has emergent HD overnight - K now 5.1, nephrology following     ESRD (end stage renal disease) on dialysis  Missed HD due to being sick with COVID   Emergent HD last night due to hyperkalemia     ID  * Sepsis  Unknown source at this time   - fever likely from covid. Will follow continue to cultures, broad spectrum abx   She does have a history of MDRO, ESBL and CRPA in her wounds in the past   Lactic acid 1.5, not hypotensive          COVID-19  Positive date 08/07/24 at [facility]   Still febrile   No respiratory problems, on RA   Isolation       Oncology  Anemia in chronic kidney disease  Stable     Endocrine  Severe protein-calorie malnutrition  Nutrition consulted. Most recent weight and BMI monitored-     Measurements:  Wt Readings from Last 1 Encounters:   08/10/24 68 " kg (150 lb)   Body mass index is 22.15 kg/m².    Patient has been screened and assessed by RD.    Malnutrition Type:  Context:    Level:      Malnutrition Characteristic Summary:       Interventions/Recommendations (treatment strategy):         GI  GERD (gastroesophageal reflux disease)  ppi    Other  Atypical anxiety disorder  Stable      SLE (systemic lupus erythematosus)  Noted         Critical Care Time: 40 minutes  Critical care was time spent personally by me on the following activities: development of treatment plan with patient or surrogate and bedside caregivers, discussions with consultants, evaluation of patient's response to treatment, examination of patient, ordering and performing treatments and interventions, ordering and review of laboratory studies, ordering and review of radiographic studies, pulse oximetry, re-evaluation of patient's condition. This critical care time did not overlap with that of any other provider or involve time for any procedures.     Karey Torres, ZENA-ACNP  Critical Care Medicine  [facility] - [address] [place] [address]

## 2024-08-11 NOTE — ASSESSMENT & PLAN NOTE
Anemia is likely due to chronic disease due to ESRD. Most recent hemoglobin and hematocrit are listed below.  Recent Labs     08/10/24  1457   HGB 8.6*   HCT 28.5*     Plan  - Monitor serial CBC: Daily  - Transfuse PRBC if patient becomes hemodynamically unstable, symptomatic or H/H drops below 7/21.  - Patient has not received any PRBC transfusions to date  - Patient's anemia is currently stable  -

## 2024-08-11 NOTE — SUBJECTIVE & OBJECTIVE
Interval History/Significant Events: awake, alert, decreased appetite.     Review of Systems   Constitutional:  Positive for fatigue.   Musculoskeletal:  Positive for myalgias.     Objective:     Vital Signs (Most Recent):  Temp: 99 °F (37.2 °C) (08/11/24 1130)  Pulse: 101 (08/11/24 1215)  Resp: 15 (08/11/24 1235)  BP: 112/76 (08/11/24 1215)  SpO2: 99 % (08/11/24 1215) Vital Signs (24h Range):  Temp:  [99 °F (37.2 °C)-102.8 °F (39.3 °C)] 99 °F (37.2 °C)  Pulse:  [] 101  Resp:  [12-25] 15  SpO2:  [90 %-100 %] 99 %  BP: ()/(51-97) 112/76   Weight: 68 kg (150 lb)  Body mass index is 22.15 kg/m².      Intake/Output Summary (Last 24 hours) at 8/11/2024 1237  Last data filed at 8/10/2024 1911  Gross per 24 hour   Intake 150.22 ml   Output --   Net 150.22 ml          Physical Exam  Vitals reviewed.   HENT:      Head: Normocephalic.      Right Ear: External ear normal.      Left Ear: External ear normal.   Eyes:      Conjunctiva/sclera: Conjunctivae normal.   Cardiovascular:      Rate and Rhythm: Normal rate and regular rhythm.   Pulmonary:      Effort: Pulmonary effort is normal.      Breath sounds: Normal breath sounds.   Abdominal:      General: Bowel sounds are normal.      Palpations: Abdomen is soft.   Musculoskeletal:      Cervical back: Normal range of motion.   Skin:     Comments: Wounds to sacrum and lower ext, see media for details    Neurological:      Mental Status: She is alert. Mental status is at baseline.   Psychiatric:         Mood and Affect: Mood normal.            Vents:     Lines/Drains/Airways       Peripheral Intravenous Line  Duration                  Hemodialysis AV Fistula Left upper arm -- days         Peripheral IV - Single Lumen 08/10/24 1700 20 G Yes Anterior;Right Upper Arm <1 day                  Significant Labs:    CBC/Anemia Profile:  Recent Labs   Lab 08/10/24  1457   WBC 14.61*   HGB 8.6*   HCT 28.5*      MCV 90.8   RDW 20.2*        Chemistries:  Recent Labs   Lab  08/10/24  1457 08/10/24  1604 08/11/24  0537   NA  --  133* 133*   K  --  8.0* 5.1   CL  --  104 101   CO2  --  19* 25   BUN  --  103* 56*   CREATININE  --  9.78* 6.11*   CALCIUM  --  8.4* 8.2*   ALBUMIN  --  2.3*  --    PROT  --  9.3*  --    BILITOT  --  0.9  --    ALKPHOS  --  124*  --    ALT  --  8*  --    AST  --  15  --    MG 2.1  --  1.9   PHOS 6.5*  --  5.0*       All pertinent labs within the past 24 hours have been reviewed.    Significant Imaging:  I have reviewed all pertinent imaging results/findings within the past 24 hours.

## 2024-08-11 NOTE — ASSESSMENT & PLAN NOTE
Unknown source at this time   - fever likely from covid. Will follow continue to cultures, broad spectrum abx   She does have a history of MDRO, ESBL and CRPA in her wounds in the past   Lactic acid 1.5, not hypotensive

## 2024-08-11 NOTE — PROGRESS NOTES
Initial Pharmacy Consult    Consulted to assist in the management of Vanc therapy in this 35 y/o female with sepsis.     Labs: BUN    103            SCR:   9.78            CRCL: 9    Based on the patient's weight of 68kg and the most recent lab data available, the following therapy will be initiated: Vanc 1500mg iv x 1 dose . Will check random once dialysis days are determined and make adjustments if necessary.  Will Continue to monitor.    MARCOS Dye.Ph.

## 2024-08-11 NOTE — ASSESSMENT & PLAN NOTE
This patient does have evidence of infective focus  My overall impression is sepsis.  Source: Could be from HD  or skin   Antibiotics given-   Antibiotics (72h ago, onward)      Start     Stop Route Frequency Ordered    08/11/24 0000  ceFEPIme (MAXIPIME) 1 g in D5W 100 mL IVPB (MB+)  (cefepime IVPB in adult patients)         -- IV Every 24 hours (non-standard times) 08/10/24 1911    08/10/24 2215  mupirocin 2 % ointment         08/15/24 2059 Nasl 2 times daily 08/10/24 2101    08/10/24 2026  vancomycin - pharmacy to dose         -- IV pharmacy to manage frequency 08/10/24 1926          Latest lactate reviewed-  Recent Labs   Lab 08/10/24  1457   LACTATE 1.5     Organ dysfunction indicated by     Fluid challenge Contraindicated- Fluid bolus is contraindicated in this patient due to End Stage Liver Disease     Post- resuscitation assessment Yes Perfusion exam was performed within 6 hours of septic shock presentation after bolus shows Adequate tissue perfusion assessed by non-invasive monitoring       Will stable Pressors- Levophed for MAP of 65  Source control achieved by antibiotics.

## 2024-08-11 NOTE — H&P
[facility] Hospital Medicine  History & Physical    Patient Name: Madhav Correa  MRN: [id number]  Patient Class: IP- Inpatient  Admission Date: 8/10/2024  Attending Physician: Kennedy Irene MD   Primary Care Provider: [facility]         Patient information was obtained from patient, EMS personnel, past medical records, and ER records.     Subjective:     Principal Problem:Sepsis    Chief Complaint:   Chief Complaint   Patient presents with    Fever     Patient presents to the ED from Dialysis with c/o fever. 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  Temp was 103.  WBC  14 but lactic acid 1.5.  She has 16 bands and her K is 8 with EKG showing tachycardia with low voltage widened QRS.  Nephrology consulted and came to ED for emergent HD.  Patient was diagnosed with COVID last week and she has not been feeling well.  UA does not look infectious.  ED NP got BC x2 before giving rocephin.  CXR shows no acute process.      Patient is a long term wound care patient and wounds appear improved from our last encounter.  I have changed to cefepime and added vanco to antibiotic regimen.  She has some confusion and lethargy at this time and will try to get more info after HD and when afebrile.  Last time when I admitted her she was very alert and oriented and we talked about her two children.  She says that she has not felt well for about a week when she got diagnosed with COVID but denies N/V/D.  She normally is able to ambulate with a walker but she is so weak at this time she can only move her BLE to gravity.  BUE 4/5 but she is able to hold the remote control and the juice cup.  She is hungry at this time and is feeling better after HD.  She has not been for her past three HD visits and was sent directly to the ED  from HD today with AMS and fever.      Patient has a LUE AV fistula that has audible bruit and palpable thrill and is not tender or erythematous. Wounds are healing but ESR CRP pending as are blood cultures and will continue to cover with cefepime and vanco until cultures return.      Remainder of ROS as below.  See assessment and plan below for problem based evaluation      Past Medical History:   Diagnosis Date    Anemia in chronic kidney disease     Asthma     Atypical anxiety disorder     Calciphylaxis     severe failed 8 weeks of thiosulfate tx in [place]   see dc summary    Encounter for blood transfusion     multiple    ESRD (end stage renal disease) on dialysis     HD   TTS    GERD (gastroesophageal reflux disease)     Severe protein-calorie malnutrition     SLE (systemic lupus erythematosus)        Past Surgical History:   Procedure Laterality Date    AV FISTULA PLACEMENT Left 03/08/2023    with Banding of left AV fistula: Surgeon Rodrigue Lowery Jr. MD    DEBRIDEMENT OF LOWER EXTREMITY Bilateral 4/18/2024    Procedure: DEBRIDEMENT, LOWER EXTREMITY;  Surgeon: Henok Gage DO;  Location: [facility] OR;  Service: General;  Laterality: Bilateral;    DEBRIDEMENT OF LOWER EXTREMITY Bilateral 4/24/2024    Procedure: DEBRIDEMENT, LOWER EXTREMITY;  Surgeon: Heonk Gage DO;  Location: [facility] OR;  Service: General;  Laterality: Bilateral;    DEBRIDEMENT OF SACRAL WOUND N/A 4/18/2024    Procedure: DEBRIDEMENT, WOUND, SACRUM;  Surgeon: Henok Gage DO;  Location: [facility] OR;  Service: General;  Laterality: N/A;    DEBRIDEMENT OF SACRAL WOUND N/A 4/24/2024    Procedure: DEBRIDEMENT, WOUND, SACRUM;  Surgeon: Henok Gage DO;  Location: [facility] OR;  Service: General;  Laterality: N/A;    DIALYSIS FISTULA CREATION Left 06/02/2022    Upper extremity by Ben Hager MD    TUBAL LIGATION         Review of patient's allergies indicates:   Allergen Reactions    Iodine Swelling     Causes  swelling, itching , and nausea.    Strawberry Anaphylaxis, Swelling and Other (See Comments)       No current facility-administered medications on file prior to encounter.     Current Outpatient Medications on File Prior to Encounter   Medication Sig    acetaminophen (TYLENOL) 325 MG tablet Take 650 mg by mouth every 6 (six) hours as needed.    albuterol (PROVENTIL) 2.5 mg /3 mL (0.083 %) nebulizer solution Inhale 2.5 mg into the lungs every 6 (six) hours as needed.    buPROPion (WELLBUTRIN) 100 MG tablet Take 100 mg by mouth every evening.    busPIRone (BUSPAR) 5 MG Tab Take 5 mg by mouth 2 (two) times daily.    carvediloL (COREG) 25 MG tablet Take 1 tablet by mouth 2 (two) times daily with meals.    famotidine (PEPCID) 20 MG tablet Take 20 mg by mouth once daily.    gentamicin (GARAMYCIN) 0.1 % ointment Apply topically once daily.    hydrALAZINE (APRESOLINE) 100 MG tablet Take 100 mg by mouth every 8 (eight) hours.    isosorbide mononitrate (IMDUR) 30 MG 24 hr tablet Take 30 mg by mouth once daily.    lactulose (CHRONULAC) 10 gram/15 mL solution Take 20 g by mouth. Every day on Monday, Wednesday, Friday, and Sunday    melatonin 3 mg Cap Take 9 mg by mouth every evening.    minocycline (MINOCIN,DYNACIN) 100 MG capsule Take 100 mg by mouth 2 (two) times daily.    mirtazapine (REMERON) 7.5 MG Tab Take 7.5 mg by mouth every evening.    ondansetron (ZOFRAN-ODT) 4 MG TbDL Take 4 mg by mouth every 8 (eight) hours as needed.    PERCOCET  mg per tablet Take 1 tablet by mouth every 6 (six) hours as needed.    polyethylene glycol (GLYCOLAX) 17 gram PwPk Take 17 g by mouth once daily.    sevelamer carbonate (RENVELA) 0.8 gram PwPk Take 0.8 g by mouth 3 (three) times daily.    sodium hypochlorite (DAKINS) external solution Apply topically once daily.    nitroGLYCERIN (NITROSTAT) 0.4 MG SL tablet Place 0.4 mg under the tongue every 5 (five) minutes as needed for Chest pain.     Family History       Problem Relation (Age  of Onset)    Cancer Maternal Aunt    Diabetes Daughter    Hypertension Mother, Father          Tobacco Use    Smoking status: Former     Current packs/day: 1.00     Average packs/day: 1 pack/day for 4.3 years (4.3 ttl pk-yrs)     Types: Cigarettes     Start date: 5/6/2020     Quit date: 5/6/2015    Smokeless tobacco: Never   Substance and Sexual Activity    Alcohol use: Never    Drug use: Never    Sexual activity: Not Currently     Review of Systems   Constitutional:  Positive for chills, fatigue and fever. Negative for appetite change and unexpected weight change.   HENT:  Negative for congestion, mouth sores, nosebleeds, sinus pain, sore throat and trouble swallowing.    Respiratory:  Negative for apnea, cough, chest tightness and shortness of breath.    Cardiovascular:  Negative for chest pain, palpitations and leg swelling.   Gastrointestinal:  Negative for abdominal pain, blood in stool, constipation, diarrhea, nausea and vomiting.   Genitourinary:  Negative for decreased urine volume, difficulty urinating, dysuria and frequency.   Musculoskeletal:  Negative for arthralgias, back pain and neck pain.   Skin:  Negative for rash.   Neurological:  Negative for syncope, light-headedness and headaches.   Hematological:  Does not bruise/bleed easily.   Psychiatric/Behavioral:  Positive for confusion. Negative for agitation and suicidal ideas.      Objective:     Vital Signs (Most Recent):  Temp: 99.2 °F (37.3 °C) (08/11/24 0245)  Pulse: 110 (08/11/24 0130)  Resp: (!) 22 (08/11/24 0314)  BP: (!) 133/97 (08/11/24 0130)  SpO2: 100 % (08/11/24 0130) Vital Signs (24h Range):  Temp:  [99.2 °F (37.3 °C)-102.8 °F (39.3 °C)] 99.2 °F (37.3 °C)  Pulse:  [] 110  Resp:  [14-25] 22  SpO2:  [93 %-100 %] 100 %  BP: (109-133)/(68-97) 133/97     Weight: 68 kg (150 lb)  Body mass index is 22.15 kg/m².     Physical Exam  Vitals and nursing note reviewed. Exam conducted with a chaperone present.   Constitutional:       General: She  is not in acute distress.     Appearance: She is ill-appearing. She is not toxic-appearing.   HENT:      Head: Atraumatic.      Mouth/Throat:      Mouth: Mucous membranes are moist.      Pharynx: Oropharynx is clear.   Eyes:      Conjunctiva/sclera: Conjunctivae normal.      Pupils: Pupils are equal, round, and reactive to light.   Cardiovascular:      Rate and Rhythm: Regular rhythm. Tachycardia present.      Pulses: Normal pulses.      Heart sounds: Normal heart sounds.   Pulmonary:      Effort: Pulmonary effort is normal.      Breath sounds: Normal breath sounds.   Abdominal:      General: Abdomen is flat. Bowel sounds are normal.      Tenderness: There is no abdominal tenderness.      Comments: Firm     Musculoskeletal:         General: Normal range of motion.      Cervical back: Neck supple.      Right lower leg: Edema present.      Left lower leg: Edema present.   Skin:     General: Skin is warm and dry.      Coloration: Skin is not jaundiced or pale.      Findings: Lesion and wound present. No bruising or rash.   Neurological:      Mental Status: She is oriented to person, place, and time. Mental status is at baseline. She is lethargic.      Motor: Weakness present.   Psychiatric:         Mood and Affect: Mood normal.              CRANIAL NERVES     CN III, IV, VI   Pupils are equal, round, and reactive to light.       Significant Labs: All pertinent labs within the past 24 hours have been reviewed.    Significant Imaging: I have reviewed all pertinent imaging results/findings within the past 24 hours.  Assessment/Plan:     * Sepsis  This patient does have evidence of infective focus  My overall impression is sepsis.  Source: Could be from HD  or skin   Antibiotics given-   Antibiotics (72h ago, onward)      Start     Stop Route Frequency Ordered    08/11/24 0000  ceFEPIme (MAXIPIME) 1 g in D5W 100 mL IVPB (MB+)  (cefepime IVPB in adult patients)         -- IV Every 24 hours (non-standard times) 08/10/24 1911     08/10/24 2215  mupirocin 2 % ointment         08/15/24 2059 Nasl 2 times daily 08/10/24 2101    08/10/24 2026  vancomycin - pharmacy to dose         -- IV pharmacy to manage frequency 08/10/24 1926          Latest lactate reviewed-  Recent Labs   Lab 08/10/24  1457   LACTATE 1.5     Organ dysfunction indicated by     Fluid challenge Contraindicated- Fluid bolus is contraindicated in this patient due to End Stage Liver Disease     Post- resuscitation assessment Yes Perfusion exam was performed within 6 hours of septic shock presentation after bolus shows Adequate tissue perfusion assessed by non-invasive monitoring       Will stable Pressors- Levophed for MAP of 65  Source control achieved by antibiotics.      Calciphylaxis with nonhealing ulcer of leg  Has seen ID specialist  Dr. Arsenio Hoyos at [facility].        Sacral decubitus ulcer      Wound care nurse consulted    Anemia in chronic kidney disease  Anemia is likely due to chronic disease due to ESRD. Most recent hemoglobin and hematocrit are listed below.  Recent Labs     08/10/24  1457   HGB 8.6*   HCT 28.5*     Plan  - Monitor serial CBC: Daily  - Transfuse PRBC if patient becomes hemodynamically unstable, symptomatic or H/H drops below 7/21.  - Patient has not received any PRBC transfusions to date  - Patient's anemia is currently stable  -     Severe protein-calorie malnutrition  Nutrition consulted. Most recent weight and BMI monitored-     Measurements:  Wt Readings from Last 1 Encounters:   08/10/24 68 kg (150 lb)   Body mass index is 22.15 kg/m².    Patient has been screened and assessed by RD.    Malnutrition Type:   will check PAB and nutrition consult  Context:    Level:      Malnutrition Characteristic Summary:       Interventions/Recommendations (treatment strategy):         SLE (systemic lupus erythematosus)  Not currently on autoimmune agents or steroids       ESRD (end stage renal disease) on dialysis  Creatine stable for now. BMP  reviewed- noted Estimated Creatinine Clearance: 8.5 mL/min (A) (based on SCr of 9.78 mg/dL (H)). according to latest data. Based on current GFR, CKD stage is end stage.  Monitor UOP and serial BMP and adjust therapy as needed. Renally dose meds. Avoid nephrotoxic medications and procedures.    Appreciate nephrology's assistance    Atypical anxiety disorder  Wellbutrin q am and buspar q pm continue home meds.  And remeron q hs      Asthma  PRN  inhaled bronchodilators      GERD (gastroesophageal reflux disease)  Continue OP pepcid        VTE Risk Mitigation (From admission, onward)      UFH 5000 q 8h                       Initial Pharmacy Consult    Consulted to assist in the management of Vanc therapy in this 35 y/o female with sepsis.     Labs: BUN    103            SCR:   9.78            CRCL: 9    Based on the patient's weight of 68kg and the most recent lab data available, the following therapy will be initiated: Vanc 1500mg iv x 1 dose . Will check random once dialysis days are determined and make adjustments if necessary.  Will Continue to monitor.    MARCOS Dye.Ph.      Alejandrina Duggan MD  Department of Hospital Medicine  [facility]

## 2024-08-11 NOTE — ED NOTES
Pt had semi-liquid stoold.  Pt cleaned & linens changed, placed on waffle pressure reduction mattress pad.

## 2024-08-11 NOTE — ASSESSMENT & PLAN NOTE
Positive date 08/07/24 at [facility]   Still febrile   No respiratory problems, on RA   Isolation

## 2024-08-11 NOTE — PHARMACY MED REC
"Admission Medication History     The home medication history was taken by Belle Montana.    You may go to "Admission" then "Reconcile Home Medications" tabs to review and/or act upon these items.     The home medication list has been updated by the Pharmacy department.   Please read ALL comments highlighted in yellow.   Please address this information as you see fit.    Feel free to contact us if you have any questions or require assistance.  Medications Updated:  Bupropion 75 mg twice daily updated to Bupropion 100 mg once daily  Renvela 1600 mg tablets three times a day updated to Renvela 0.8 gram oral packet three times a day  Oxycodone 15 mg updated to Percocet 10/325 mg  Medications Added:  Acetaminophen 325 mg  Buspirone 5 mg  Dakins 1/2 strength external solution 0.25%  Gentamicin 0.1% ointment  Lactulose 10 gm/15 ml  Melatonin 9 mg  Minocycline 100 mg  Miralax  Nitroglycerin 0.4 SL tablet  Mirtazapine 7.5 mg      Patient reports no longer taking the following medication(s). The medication(s) listed below were removed from the home medication list. Please reorder if appropriate:  Olanzapine 5 mg  Senna-Docusate 8.6-50 mg    Medications listed below were obtained from: [employer]- [employer] and Nursing home  (Not in a hospital admission)        Current Outpatient Medications on File Prior to Encounter   Medication Sig Dispense Refill Last Dose    acetaminophen (TYLENOL) 325 MG tablet Take 650 mg by mouth every 6 (six) hours as needed.   8/10/2024    albuterol (PROVENTIL) 2.5 mg /3 mL (0.083 %) nebulizer solution Inhale 2.5 mg into the lungs every 6 (six) hours as needed.   8/10/2024    buPROPion (WELLBUTRIN) 100 MG tablet Take 100 mg by mouth every evening.   8/9/2024    busPIRone (BUSPAR) 5 MG Tab Take 5 mg by mouth 2 (two) times daily.   8/10/2024    carvediloL (COREG) 25 MG tablet Take 1 tablet by mouth 2 (two) times daily with meals.   8/10/2024    famotidine (PEPCID) 20 MG tablet Take 20 mg by mouth " once daily.   8/10/2024    gentamicin (GARAMYCIN) 0.1 % ointment Apply topically once daily.   8/10/2024    hydrALAZINE (APRESOLINE) 100 MG tablet Take 100 mg by mouth every 8 (eight) hours.   8/10/2024    isosorbide mononitrate (IMDUR) 30 MG 24 hr tablet Take 30 mg by mouth once daily.   8/10/2024    lactulose (CHRONULAC) 10 gram/15 mL solution Take 20 g by mouth. Every day on Monday, Wednesday, Friday, and Sunday 8/9/2024    melatonin 3 mg Cap Take 9 mg by mouth every evening.   8/9/2024    minocycline (MINOCIN,DYNACIN) 100 MG capsule Take 100 mg by mouth 2 (two) times daily.   8/10/2024    mirtazapine (REMERON) 7.5 MG Tab Take 7.5 mg by mouth every evening.   8/9/2024    ondansetron (ZOFRAN-ODT) 4 MG TbDL Take 4 mg by mouth every 8 (eight) hours as needed.   8/10/2024    PERCOCET  mg per tablet Take 1 tablet by mouth every 6 (six) hours as needed.   8/10/2024    polyethylene glycol (GLYCOLAX) 17 gram PwPk Take 17 g by mouth once daily.   8/10/2024    sevelamer carbonate (RENVELA) 0.8 gram PwPk Take 0.8 g by mouth 3 (three) times daily.   8/10/2024    sodium hypochlorite (DAKINS) external solution Apply topically once daily.   8/10/2024    nitroGLYCERIN (NITROSTAT) 0.4 MG SL tablet Place 0.4 mg under the tongue every 5 (five) minutes as needed for Chest pain.       [DISCONTINUED] buPROPion (WELLBUTRIN) 75 MG tablet Take 75 mg by mouth 2 (two) times daily.       [DISCONTINUED] OLANZapine zydis (ZYPREXA) 5 MG TbDL Take 5 mg by mouth every evening.       [DISCONTINUED] oxyCODONE (ROXICODONE) 15 MG Tab Take 15 mg by mouth every 6 (six) hours as needed.       [DISCONTINUED] senna-docusate 8.6-50 mg (PERICOLACE) 8.6-50 mg per tablet Take 2 tablets by mouth 2 (two) times a day.       [DISCONTINUED] sevelamer carbonate (RENVELA) 800 mg Tab Take 1,600 mg by mouth 3 times daily with meals.            Potential issues to be addressed PRIOR TO DISCHARGE  No issues identified.    Belle Montana  Pharmacy Tech Specialist  - Medication History  EXT. [phone]    .

## 2024-08-11 NOTE — CONSULTS
[facility] - Emergency Department  Nephrology  Consult Note    Patient Name: Madhav Correa  MRN: [id number]  Admission Date: 8/10/2024  Hospital Length of Stay: 0 days  Attending Provider: Kennedy Irene MD   Primary Care Physician: [facility]  Principal Problem:Sepsis    Consults  Subjective:     HPI: This is a 33 yo female with CKD stage 6 on hemodialysis who was found to have a fever at hemodialysis today.She was referred to the ED where she was found to have a potassium of 8 today.    Past Medical History:   Diagnosis Date    Anemia in chronic kidney disease     Asthma     Atypical anxiety disorder     Calciphylaxis     severe failed 8 weeks of thiosulfate tx in [place]   see dc summary    Encounter for blood transfusion     multiple    ESRD (end stage renal disease) on dialysis     HD   TTS    GERD (gastroesophageal reflux disease)     Severe protein-calorie malnutrition     SLE (systemic lupus erythematosus)        Past Surgical History:   Procedure Laterality Date    AV FISTULA PLACEMENT Left 03/08/2023    with Banding of left AV fistula: Surgeon Rodrigue Lowery Jr. MD    DEBRIDEMENT OF LOWER EXTREMITY Bilateral 4/18/2024    Procedure: DEBRIDEMENT, LOWER EXTREMITY;  Surgeon: Henok Gage DO;  Location: [facility] OR;  Service: General;  Laterality: Bilateral;    DEBRIDEMENT OF LOWER EXTREMITY Bilateral 4/24/2024    Procedure: DEBRIDEMENT, LOWER EXTREMITY;  Surgeon: Henok Gage DO;  Location: [facility] OR;  Service: General;  Laterality: Bilateral;    DEBRIDEMENT OF SACRAL WOUND N/A 4/18/2024    Procedure: DEBRIDEMENT, WOUND, SACRUM;  Surgeon: Henok Gage DO;  Location: [facility] OR;  Service: General;  Laterality: N/A;    DEBRIDEMENT OF SACRAL WOUND N/A 4/24/2024    Procedure: DEBRIDEMENT, WOUND, SACRUM;  Surgeon: Henok Gage DO;  Location: [facility] OR;  Service: General;  Laterality: N/A;    DIALYSIS FISTULA CREATION Left  06/02/2022    Upper extremity by Ben Hager MD    TUBAL LIGATION         Review of patient's allergies indicates:   Allergen Reactions    Iodine Swelling     Causes swelling, itching , and nausea.    Strawberry Anaphylaxis, Swelling and Other (See Comments)     Current Facility-Administered Medications   Medication Frequency    0.9%  NaCl infusion PRN    buPROPion tablet 100 mg QHS    [START ON 8/11/2024] busPIRone tablet 5 mg Daily    calcium gluconate 1 g in NS IVPB (premixed) Q10 Min PRN    [START ON 8/11/2024] ceFEPIme (MAXIPIME) 1 g in D5W 100 mL IVPB (MB+) Q24H    dextrose 50% injection 25 g PRN    [START ON 8/11/2024] famotidine tablet 20 mg Daily    metoprolol injection 5 mg ED 1 Time    mirtazapine tablet 7.5 mg QHS    mupirocin 2 % ointment BID    oxyCODONE-acetaminophen  mg per tablet 1 tablet Q6H PRN    [START ON 8/11/2024] polyethylene glycol packet 17 g Daily    [START ON 8/11/2024] sevelamer carbonate tablet 1,600 mg TID WM    vancomycin - pharmacy to dose pharmacy to manage frequency     Current Outpatient Medications   Medication    acetaminophen (TYLENOL) 325 MG tablet    albuterol (PROVENTIL) 2.5 mg /3 mL (0.083 %) nebulizer solution    buPROPion (WELLBUTRIN) 100 MG tablet    busPIRone (BUSPAR) 5 MG Tab    carvediloL (COREG) 25 MG tablet    famotidine (PEPCID) 20 MG tablet    gentamicin (GARAMYCIN) 0.1 % ointment    hydrALAZINE (APRESOLINE) 100 MG tablet    isosorbide mononitrate (IMDUR) 30 MG 24 hr tablet    lactulose (CHRONULAC) 10 gram/15 mL solution    melatonin 3 mg Cap    minocycline (MINOCIN,DYNACIN) 100 MG capsule    mirtazapine (REMERON) 7.5 MG Tab    ondansetron (ZOFRAN-ODT) 4 MG TbDL    PERCOCET  mg per tablet    polyethylene glycol (GLYCOLAX) 17 gram PwPk    sevelamer carbonate (RENVELA) 0.8 gram PwPk    sodium hypochlorite (DAKINS) external solution    nitroGLYCERIN (NITROSTAT) 0.4 MG SL tablet     Family History       Problem Relation (Age of Onset)    Cancer Maternal  Aunt    Diabetes Daughter    Hypertension Mother, Father          Tobacco Use    Smoking status: Former     Current packs/day: 1.00     Average packs/day: 1 pack/day for 4.3 years (4.3 ttl pk-yrs)     Types: Cigarettes     Start date: 5/6/2020     Quit date: 5/6/2015    Smokeless tobacco: Never   Substance and Sexual Activity    Alcohol use: Never    Drug use: Never    Sexual activity: Not Currently     Review of Systems  Objective:     Vital Signs (Most Recent):  Temp: 100.1 °F (37.8 °C) (08/10/24 1900)  Pulse: 100 (08/10/24 2300)  Resp: 15 (08/10/24 2300)  BP: 127/77 (08/10/24 2300)  SpO2: 100 % (08/10/24 2300) Vital Signs (24h Range):  Temp:  [100.1 °F (37.8 °C)-102.8 °F (39.3 °C)] 100.1 °F (37.8 °C)  Pulse:  [] 100  Resp:  [14-25] 15  SpO2:  [93 %-100 %] 100 %  BP: (109-131)/(68-87) 127/77     Weight: 68 kg (150 lb) (08/10/24 1407)  Body mass index is 22.15 kg/m².  Body surface area is 1.82 meters squared.    No intake/output data recorded.    Physical Exam  Lungs-clear  Cor-1/6 systolic murmur  Abd-distended,with tenderness to palpation  Ext-1+pitting pretibial edema    Significant Labs:  CBC:   Recent Labs   Lab 08/10/24  1457   WBC 14.61*   RBC 3.14*   HGB 8.6*   HCT 28.5*      MCV 90.8   MCH 27.4   MCHC 30.2*     CMP:   Recent Labs   Lab 08/10/24  1604   GLU 92   CALCIUM 8.4*   ALBUMIN 2.3*   PROT 9.3*   *   K 8.0*   CO2 19*      *   CREATININE 9.78*   ALKPHOS 124*   ALT 8*   AST 15   BILITOT 0.9     All labs within the past 24 hours have been reviewed.    Significant Imaging:      Assessment/Plan:     Active Diagnoses:    Diagnosis Date Noted POA    PRINCIPAL PROBLEM:  Sepsis [A41.9] 08/10/2024 Yes    Hyperkalemia, diminished renal excretion [E87.5] 08/10/2024 Yes      Problems Resolved During this Admission:       Plan:  Hemodialysis today    Thank you for your consult. I will follow-up with patient. Please contact us if you have any additional questions.    Tony Lerma,  MD  Nephrology  [facility] - Emergency Department

## 2024-08-12 LAB
BASOPHILS # BLD AUTO: 0.02 K/UL (ref 0–0.2)
BASOPHILS NFR BLD AUTO: 0.1 % (ref 0–1)
DIFFERENTIAL METHOD BLD: ABNORMAL
EOSINOPHIL # BLD AUTO: 0 K/UL (ref 0–0.5)
EOSINOPHIL NFR BLD AUTO: 0 % (ref 1–4)
ERYTHROCYTE [DISTWIDTH] IN BLOOD BY AUTOMATED COUNT: 20.6 % (ref 11.5–14.5)
GLUCOSE SERPL-MCNC: 115 MG/DL (ref 70–105)
GLUCOSE SERPL-MCNC: 118 MG/DL (ref 70–105)
GLUCOSE SERPL-MCNC: 120 MG/DL (ref 70–105)
HCT VFR BLD AUTO: 25 % (ref 38–47)
HGB BLD-MCNC: 7.4 G/DL (ref 12–16)
IMM GRANULOCYTES # BLD AUTO: 0.14 K/UL (ref 0–0.04)
IMM GRANULOCYTES NFR BLD: 1 % (ref 0–0.4)
LYMPHOCYTES # BLD AUTO: 0.89 K/UL (ref 1–4.8)
LYMPHOCYTES NFR BLD AUTO: 6.3 % (ref 27–41)
MCH RBC QN AUTO: 27.9 PG (ref 27–31)
MCHC RBC AUTO-ENTMCNC: 29.6 G/DL (ref 32–36)
MCV RBC AUTO: 94.3 FL (ref 80–96)
MONOCYTES # BLD AUTO: 0.48 K/UL (ref 0–0.8)
MONOCYTES NFR BLD AUTO: 3.4 % (ref 2–6)
MPC BLD CALC-MCNC: 10.5 FL (ref 9.4–12.4)
NEUTROPHILS # BLD AUTO: 12.67 K/UL (ref 1.8–7.7)
NEUTROPHILS NFR BLD AUTO: 89.2 % (ref 53–65)
NRBC # BLD AUTO: 0 X10E3/UL
NRBC, AUTO (.00): 0 %
OHS QRS DURATION: 108 MS
OHS QRS DURATION: 96 MS
OHS QTC CALCULATION: 445 MS
OHS QTC CALCULATION: 459 MS
PLATELET # BLD AUTO: 211 K/UL (ref 150–400)
RBC # BLD AUTO: 2.65 M/UL (ref 4.2–5.4)
UA COMPLETE W REFLEX CULTURE PNL UR: ABNORMAL
VANCOMYCIN SERPL-MCNC: 13 ΜG/ML (ref 0–20)
WBC # BLD AUTO: 14.2 K/UL (ref 4.5–11)

## 2024-08-12 PROCEDURE — 63600175 PHARM REV CODE 636 W HCPCS: Performed by: FAMILY MEDICINE

## 2024-08-12 PROCEDURE — 63600175 PHARM REV CODE 636 W HCPCS: Performed by: HOSPITALIST

## 2024-08-12 PROCEDURE — 82962 GLUCOSE BLOOD TEST: CPT

## 2024-08-12 PROCEDURE — 63600175 PHARM REV CODE 636 W HCPCS: Performed by: NURSE PRACTITIONER

## 2024-08-12 PROCEDURE — 11000001 HC ACUTE MED/SURG PRIVATE ROOM

## 2024-08-12 PROCEDURE — 25000003 PHARM REV CODE 250: Performed by: INTERNAL MEDICINE

## 2024-08-12 PROCEDURE — 80100014 HC HEMODIALYSIS 1:1

## 2024-08-12 PROCEDURE — 36415 COLL VENOUS BLD VENIPUNCTURE: CPT | Performed by: INTERNAL MEDICINE

## 2024-08-12 PROCEDURE — 99232 SBSQ HOSP IP/OBS MODERATE 35: CPT | Mod: ,,, | Performed by: FAMILY MEDICINE

## 2024-08-12 PROCEDURE — 27000207 HC ISOLATION

## 2024-08-12 PROCEDURE — 80202 ASSAY OF VANCOMYCIN: CPT | Performed by: INTERNAL MEDICINE

## 2024-08-12 PROCEDURE — 94761 N-INVAS EAR/PLS OXIMETRY MLT: CPT

## 2024-08-12 PROCEDURE — 25000003 PHARM REV CODE 250: Performed by: HOSPITALIST

## 2024-08-12 PROCEDURE — 25000003 PHARM REV CODE 250: Performed by: FAMILY MEDICINE

## 2024-08-12 RX ADMIN — HEPARIN SODIUM 5000 UNITS: 5000 INJECTION, SOLUTION INTRAVENOUS; SUBCUTANEOUS at 05:08

## 2024-08-12 RX ADMIN — OXYCODONE AND ACETAMINOPHEN 1 TABLET: 10; 325 TABLET ORAL at 12:08

## 2024-08-12 RX ADMIN — SODIUM CHLORIDE: 9 INJECTION, SOLUTION INTRAVENOUS at 12:08

## 2024-08-12 RX ADMIN — OXYCODONE AND ACETAMINOPHEN 1 TABLET: 10; 325 TABLET ORAL at 08:08

## 2024-08-12 RX ADMIN — FAMOTIDINE 20 MG: 20 TABLET, FILM COATED ORAL at 08:08

## 2024-08-12 RX ADMIN — BUPROPION HYDROCHLORIDE 100 MG: 100 TABLET, FILM COATED ORAL at 08:08

## 2024-08-12 RX ADMIN — OXYCODONE AND ACETAMINOPHEN 1 TABLET: 10; 325 TABLET ORAL at 03:08

## 2024-08-12 RX ADMIN — MUPIROCIN: 20 OINTMENT TOPICAL at 08:08

## 2024-08-12 RX ADMIN — MORPHINE SULFATE 4 MG: 4 INJECTION, SOLUTION INTRAMUSCULAR; INTRAVENOUS at 05:08

## 2024-08-12 RX ADMIN — MORPHINE SULFATE 4 MG: 4 INJECTION, SOLUTION INTRAMUSCULAR; INTRAVENOUS at 10:08

## 2024-08-12 RX ADMIN — DEXTROSE MONOHYDRATE 1500 MG: 50 INJECTION, SOLUTION INTRAVENOUS at 05:08

## 2024-08-12 RX ADMIN — MORPHINE SULFATE 4 MG: 4 INJECTION, SOLUTION INTRAMUSCULAR; INTRAVENOUS at 11:08

## 2024-08-12 RX ADMIN — HEPARIN SODIUM 5000 UNITS: 5000 INJECTION, SOLUTION INTRAVENOUS; SUBCUTANEOUS at 02:08

## 2024-08-12 RX ADMIN — SEVELAMER CARBONATE 1600 MG: 800 TABLET, FILM COATED ORAL at 05:08

## 2024-08-12 RX ADMIN — SEVELAMER CARBONATE 1600 MG: 800 TABLET, FILM COATED ORAL at 07:08

## 2024-08-12 RX ADMIN — POLYETHYLENE GLYCOL 3350 17 G: 17 POWDER, FOR SOLUTION ORAL at 08:08

## 2024-08-12 RX ADMIN — MORPHINE SULFATE 4 MG: 4 INJECTION, SOLUTION INTRAMUSCULAR; INTRAVENOUS at 03:08

## 2024-08-12 RX ADMIN — MIRTAZAPINE 7.5 MG: 7.5 TABLET, FILM COATED ORAL at 08:08

## 2024-08-12 RX ADMIN — SEVELAMER CARBONATE 1600 MG: 800 TABLET, FILM COATED ORAL at 12:08

## 2024-08-12 RX ADMIN — BUSPIRONE HYDROCHLORIDE 5 MG: 5 TABLET ORAL at 08:08

## 2024-08-12 RX ADMIN — HEPARIN SODIUM 5000 UNITS: 5000 INJECTION, SOLUTION INTRAVENOUS; SUBCUTANEOUS at 09:08

## 2024-08-12 RX ADMIN — OXYCODONE AND ACETAMINOPHEN 1 TABLET: 10; 325 TABLET ORAL at 07:08

## 2024-08-12 RX ADMIN — MEROPENEM 500 MG: 500 INJECTION INTRAVENOUS at 07:08

## 2024-08-12 NOTE — PROGRESS NOTES
Pharmacokinetic Assessment Follow Up: IV Vancomycin    Vancomycin serum concentration assessment(s):    The random level was drawn correctly and can be used to guide therapy at this time. The measurement is below the desired definitive target range of 15 to 20 mcg/mL.    Vancomycin Regimen Plan:    Vanc 1500 mg IV x 1 today with a random vanc level ordered for 8/16 at 0400    Drug levels (last 3 results):  Recent Labs   Lab Result Units 08/12/24  0523   Vancomycin, Random µg/mL 13.0       Pharmacy will continue to follow and monitor vancomycin.    Please contact pharmacy at extension [phone] for questions regarding this assessment.    Patient brief summary:  Madhav Correa is a 34 y.o. female initiated on antimicrobial therapy with IV Vancomycin for treatment of sepsis    Drug Allergies:   Review of patient's allergies indicates:   Allergen Reactions    Iodine Swelling     Causes swelling, itching , and nausea.    Strawberry Anaphylaxis, Swelling and Other (See Comments)       Actual Body Weight:   68 kg    Renal Function:   Estimated Creatinine Clearance: 13.6 mL/min (A) (based on SCr of 6.11 mg/dL (H)).,     Dialysis Method (if applicable):  intermittent HD    CBC (last 72 hours):  Recent Labs   Lab Result Units 08/10/24  1457   WBC K/uL 14.61*   Hemoglobin g/dL 8.6*   Hematocrit % 28.5*   Platelet Count K/uL 253   Lymphocytes % % 7.4*   Lymphocytes, Man % % 6*   Monocytes % % 3.7   Monocytes, Man % % 5   Eosinophils % % 0.1*   Basophils % % 0.3   Diff Type  Manual       Metabolic Panel (last 72 hours):  Recent Labs   Lab Result Units 08/10/24  1457 08/10/24  1559 08/10/24  1604 08/11/24  0537   Sodium mmol/L  --   --  133* 133*   Potassium mmol/L  --   --  8.0* 5.1   Chloride mmol/L  --   --  104 101   CO2 mmol/L  --   --  19* 25   Glucose mg/dL  --   --  92 123*   Glucose, UA mg/dL  --  Normal  --   --    BUN mg/dL  --   --  103* 56*   Creatinine mg/dL  --   --  9.78* 6.11*   Albumin g/dL  --   --  2.3*  --     Bilirubin, Total mg/dL  --   --  0.9  --    Alk Phos U/L  --   --  124*  --    AST U/L  --   --  15  --    ALT U/L  --   --  8*  --    Magnesium mg/dL 2.1  --   --  1.9   Phosphorus mg/dL 6.5*  --   --  5.0*       Vancomycin Administrations:  vancomycin given in the last 96 hours                     vancomycin (VANCOCIN) 1,500 mg in D5W 250 mL IVPB (mg) 1,500 mg New Bag 08/10/24 2004                    Microbiologic Results:  Microbiology Results (last 7 days)       Procedure Component Value Units Date/Time    Influenza A & B by Molecular [[id number]]  (Normal) Collected: 08/11/24 1017    Order Status: Completed Specimen: Nasopharyngeal Swab Updated: 08/11/24 1201     INFLUENZA A MOLECULAR Negative     INFLUENZA B MOLECULAR  Negative    Urine culture [[id number]]  (Abnormal) Collected: 08/10/24 1559    Order Status: Completed Specimen: Urine Updated: 08/11/24 1111     Culture, Urine 25,000 Gram-negative Bacilli     Comment: Identification and Susceptibility to Follow       Blood culture x two cultures. Draw prior to antibiotics. [[id number]] Collected: 08/10/24 1459    Order Status: Resulted Specimen: Blood Updated: 08/10/24 1502    Blood culture x two cultures. Draw prior to antibiotics. [[id number]] Collected: 08/10/24 1457    Order Status: Resulted Specimen: Blood Updated: 08/10/24 1458

## 2024-08-12 NOTE — PROGRESS NOTES
The patient has no complaints today    PE  Lungs-clear  Cor-2/6 systolic murmur no rub  ABD-distended nontender   Ext-2+pitting pretibial edema    Imp:  CKD STAGE 6  Anemia  SLE    Plan:  Hemodialysis tomorrow

## 2024-08-12 NOTE — PLAN OF CARE
Problem: Adult Inpatient Plan of Care  Goal: Plan of Care Review  Outcome: Progressing  Goal: Patient-Specific Goal (Individualized)  Outcome: Progressing  Goal: Absence of Hospital-Acquired Illness or Injury  Outcome: Progressing  Goal: Optimal Comfort and Wellbeing  Outcome: Progressing  Goal: Readiness for Transition of Care  Outcome: Progressing     Problem: Hemodialysis  Goal: Safe, Effective Therapy Delivery  Outcome: Progressing  Goal: Effective Tissue Perfusion  Outcome: Progressing  Goal: Absence of Infection Signs and Symptoms  Outcome: Progressing     Problem: Sepsis/Septic Shock  Goal: Optimal Coping  Outcome: Progressing  Goal: Absence of Bleeding  Outcome: Progressing  Goal: Blood Glucose Level Within Targeted Range  Outcome: Progressing  Goal: Absence of Infection Signs and Symptoms  Outcome: Progressing  Goal: Optimal Nutrition Intake  Outcome: Progressing     Problem: Wound  Goal: Optimal Coping  Outcome: Progressing  Goal: Optimal Functional Ability  Outcome: Progressing  Goal: Absence of Infection Signs and Symptoms  Outcome: Progressing  Goal: Improved Oral Intake  Outcome: Progressing  Goal: Optimal Pain Control and Function  Outcome: Progressing  Goal: Skin Health and Integrity  Outcome: Progressing  Goal: Optimal Wound Healing  Outcome: Progressing     Problem: Skin Injury Risk Increased  Goal: Skin Health and Integrity  Outcome: Progressing

## 2024-08-12 NOTE — DIALYSIS ROUNDING
"          Nephrology Department            NAME: Madhav Correa   YOB: 1990  MRN: [id number]  NOTE DATE: 08/12/2024       Seen in Dialysis Unit.    Patient complains:  None.      REVIEW OF SYSTEMS:  she reports no shortness of breath, nausea or vomiting. No acute changes.    VITALS:  height is 5' 9" (1.753 m) and weight is 68 kg (150 lb). Her oral temperature is 97.2 °F (36.2 °C). Her blood pressure is 134/78 and her pulse is 115 (abnormal). Her respiration is 18 and oxygen saturation is 98%.  Hemodialysis documentation flowsheet reviewed with dialysis nurse, including weight and vitals.    Resting comfortably, NAD.  Respiration unlabored.  Heart regular  Abdomen soft, nontender, positive bowl sounds  No edema.    Recent Labs   Lab 08/10/24  1457 08/10/24  1604 08/11/24  0537   NA  --  133* 133*   K  --  8.0* 5.1   CL  --  104 101   CO2  --  19* 25   BUN  --  103* 56*   CREATININE  --  9.78* 6.11*   CALCIUM  --  8.4* 8.2*   PHOS 6.5*  --  5.0*     Recent Labs   Lab 08/10/24  1457 08/11/24  0537   WBC 14.61* 14.20*   HGB 8.6* 7.4*   HCT 28.5* 25.0*    211       ASSESSMENT/PLAN:  Continue with scheduled hemodialysis for this patient.    Saad Maharaj Jr, MD  "

## 2024-08-13 PROBLEM — J32.0 CHRONIC SINUSITIS OF BOTH MAXILLARY SINUSES: Status: ACTIVE | Noted: 2024-08-13

## 2024-08-13 LAB
ALBUMIN PE, BLOOD: 2.8 G/DL (ref 3.5–5.2)
ALBUMIN SERPL BCP-MCNC: 1.9 G/DL (ref 3.5–5)
ALBUMIN/GLOB SERPL: 0.3 {RATIO}
ALBUMIN/GLOB SERPL: 0.3 {RATIO}
ALP SERPL-CCNC: 88 U/L (ref 37–98)
ALPHA1 GLOB SERPL ELPH-MCNC: 0.3 G/DL (ref 0.1–0.4)
ALPHA2 GLOB SERPL ELPH-MCNC: 0.8 G/DL (ref 0.4–1.3)
ALT SERPL W P-5'-P-CCNC: 7 U/L (ref 13–56)
ANA SER QL: POSITIVE
ANION GAP SERPL CALCULATED.3IONS-SCNC: 10 MMOL/L (ref 7–16)
AORTIC ROOT ANNULUS: 2.94 CM
AORTIC VALVE CUSP SEPERATION: 2.05 CM
APICAL FOUR CHAMBER EJECTION FRACTION: 21 %
ASCENDING AORTA: 6.78 CM
AST SERPL W P-5'-P-CCNC: 17 U/L (ref 15–37)
AV INDEX (PROSTH): 0.6
AV MEAN GRADIENT: 5 MMHG
AV PEAK GRADIENT: 9 MMHG
AV VALVE AREA BY VELOCITY RATIO: 2.34 CM²
AV VALVE AREA: 2.33 CM²
AV VELOCITY RATIO: 0.6
B-GLOBULIN SERPL ELPH-MCNC: 0.6 G/DL (ref 0.5–1.5)
BASOPHILS # BLD AUTO: 0.01 K/UL (ref 0–0.2)
BASOPHILS NFR BLD AUTO: 0.2 % (ref 0–1)
BILIRUB SERPL-MCNC: 0.5 MG/DL (ref ?–1.2)
BSA FOR ECHO PROCEDURE: 1.82 M2
BUN SERPL-MCNC: 44 MG/DL (ref 7–18)
BUN/CREAT SERPL: 9 (ref 6–20)
CALCIUM SERPL-MCNC: 8.3 MG/DL (ref 8.5–10.1)
CHLORIDE SERPL-SCNC: 99 MMOL/L (ref 98–107)
CO2 SERPL-SCNC: 27 MMOL/L (ref 21–32)
CREAT SERPL-MCNC: 4.99 MG/DL (ref 0.55–1.02)
CV ECHO LV RWT: 0.81 CM
DIFFERENTIAL METHOD BLD: ABNORMAL
DOP CALC AO PEAK VEL: 1.54 M/S
DOP CALC AO VTI: 27.7 CM
DOP CALC LVOT AREA: 3.9 CM2
DOP CALC LVOT DIAMETER: 2.22 CM
DOP CALC LVOT PEAK VEL: 0.93 M/S
DOP CALC LVOT STROKE VOLUME: 64.61 CM3
DOP CALCLVOT PEAK VEL VTI: 16.7 CM
DSDNA IGG SERPL IA-ACNC: >200 IU (ref 0–24)
DSDNA IGG SERPL IA-ACNC: ABNORMAL [IU]/ML
E WAVE DECELERATION TIME: 98.28 MSEC
E/A RATIO: 1.11
E/E' RATIO: 5.38 M/S
ECHO LV POSTERIOR WALL: 1.9 CM (ref 0.6–1.1)
EGFR (NO RACE VARIABLE) (RUSH/TITUS): 11 ML/MIN/1.73M2
ENA AB SER QL IA: 101.3 EUS (ref 0–19.9)
ENA AB SER QL IA: POSITIVE
EOSINOPHIL # BLD AUTO: 0.21 K/UL (ref 0–0.5)
EOSINOPHIL NFR BLD AUTO: 4 % (ref 1–4)
ERYTHROCYTE [DISTWIDTH] IN BLOOD BY AUTOMATED COUNT: 19.9 % (ref 11.5–14.5)
FRACTIONAL SHORTENING: 15 % (ref 28–44)
GAMMA GLOB SERPL ELPH-MCNC: 3.6 G/DL (ref 0.5–1.8)
GLOBULIN SER-MCNC: 6.4 G/DL (ref 2–4)
GLUCOSE SERPL-MCNC: 99 MG/DL (ref 74–106)
HCT VFR BLD AUTO: 22.9 % (ref 38–47)
HGB BLD-MCNC: 6.9 G/DL (ref 12–16)
IMM GRANULOCYTES # BLD AUTO: 0.04 K/UL (ref 0–0.04)
IMM GRANULOCYTES NFR BLD: 0.8 % (ref 0–0.4)
INTERVENTRICULAR SEPTUM: 1.83 CM (ref 0.6–1.1)
IVC DIAMETER: 2.82 CM
LEFT ATRIUM AREA SYSTOLIC (APICAL 4 CHAMBER): 10.83 CM2
LEFT ATRIUM SIZE: 4.53 CM
LEFT INTERNAL DIMENSION IN SYSTOLE: 4 CM (ref 2.1–4)
LEFT VENTRICLE DIASTOLIC VOLUME INDEX: 56.14 ML/M2
LEFT VENTRICLE DIASTOLIC VOLUME: 102.73 ML
LEFT VENTRICLE END DIASTOLIC VOLUME APICAL 4 CHAMBER: 76.08 ML
LEFT VENTRICLE END SYSTOLIC VOLUME APICAL 4 CHAMBER: 21.19 ML
LEFT VENTRICLE MASS INDEX: 226 G/M2
LEFT VENTRICLE SYSTOLIC VOLUME INDEX: 38.1 ML/M2
LEFT VENTRICLE SYSTOLIC VOLUME: 69.81 ML
LEFT VENTRICULAR INTERNAL DIMENSION IN DIASTOLE: 4.71 CM (ref 3.5–6)
LEFT VENTRICULAR MASS: 413.87 G
LV LATERAL E/E' RATIO: 4.67 M/S
LV SEPTAL E/E' RATIO: 6.36 M/S
LVED V (TEICH): 102.73 ML
LVES V (TEICH): 69.81 ML
LVOT MG: 1.98 MMHG
LVOT MV: 0.67 CM/S
LYMPHOCYTES # BLD AUTO: 0.88 K/UL (ref 1–4.8)
LYMPHOCYTES NFR BLD AUTO: 16.6 % (ref 27–41)
MCH RBC QN AUTO: 26.7 PG (ref 27–31)
MCHC RBC AUTO-ENTMCNC: 30.1 G/DL (ref 32–36)
MCV RBC AUTO: 88.8 FL (ref 80–96)
MONOCYTES # BLD AUTO: 0.38 K/UL (ref 0–0.8)
MONOCYTES NFR BLD AUTO: 7.2 % (ref 2–6)
MPC BLD CALC-MCNC: 10 FL (ref 9.4–12.4)
MV PEAK A VEL: 0.63 M/S
MV PEAK E VEL: 0.7 M/S
MV STENOSIS PRESSURE HALF TIME: 28.5 MS
MV VALVE AREA P 1/2 METHOD: 7.72 CM2
NEUTROPHILS # BLD AUTO: 3.79 K/UL (ref 1.8–7.7)
NEUTROPHILS NFR BLD AUTO: 71.2 % (ref 53–65)
NRBC # BLD AUTO: 0 X10E3/UL
NRBC, AUTO (.00): 0 %
OHS LV EJECTION FRACTION SIMPSONS BIPLANE MOD: 37 %
PATH INTERP BLD-IMP: ABNORMAL
PISA MRMAX VEL: 4.06 M/S
PISA TR MAX VEL: 2.71 M/S
PLATELET # BLD AUTO: 185 K/UL (ref 150–400)
POTASSIUM SERPL-SCNC: 4.5 MMOL/L (ref 3.5–5.1)
PROT SERPL-MCNC: 8.1 G/DL (ref 6.4–8.2)
PROT SERPL-MCNC: 8.3 G/DL (ref 6.4–8.2)
PV PEAK GRADIENT: 3 MMHG
PV PEAK VELOCITY: 0.81 M/S
RA MAJOR: 5.25 CM
RA PRESSURE ESTIMATED: 15 MMHG
RBC # BLD AUTO: 2.58 M/UL (ref 4.2–5.4)
RIGHT VENTRICLE DIASTOLIC BASEL DIMENSION: 5.5 CM
RIGHT VENTRICLE DIASTOLIC LENGTH: 4.8 CM
RIGHT VENTRICLE DIASTOLIC MID DIMENSION: 4.4 CM
RIGHT VENTRICULAR LENGTH IN DIASTOLE (APICAL 4-CHAMBER VIEW): 4.83 CM
RV MID DIAMA: 4.39 CM
RV TB RVSP: 18 MMHG
SODIUM SERPL-SCNC: 131 MMOL/L (ref 136–145)
STJ: 3.37 CM
TDI LATERAL: 0.15 M/S
TDI SEPTAL: 0.11 M/S
TDI: 0.13 M/S
TR MAX PG: 29 MMHG
TRICUSPID ANNULAR PLANE SYSTOLIC EXCURSION: 1.61 CM
TV REST PULMONARY ARTERY PRESSURE: 44 MMHG
WBC # BLD AUTO: 5.31 K/UL (ref 4.5–11)
Z-SCORE OF LEFT VENTRICULAR DIMENSION IN END DIASTOLE: -0.74
Z-SCORE OF LEFT VENTRICULAR DIMENSION IN END SYSTOLE: 1.94

## 2024-08-13 PROCEDURE — 84165 PROTEIN E-PHORESIS SERUM: CPT | Performed by: INTERNAL MEDICINE

## 2024-08-13 PROCEDURE — 63600175 PHARM REV CODE 636 W HCPCS: Performed by: NURSE PRACTITIONER

## 2024-08-13 PROCEDURE — 94761 N-INVAS EAR/PLS OXIMETRY MLT: CPT

## 2024-08-13 PROCEDURE — 27000207 HC ISOLATION

## 2024-08-13 PROCEDURE — 11000001 HC ACUTE MED/SURG PRIVATE ROOM

## 2024-08-13 PROCEDURE — 36415 COLL VENOUS BLD VENIPUNCTURE: CPT | Performed by: INTERNAL MEDICINE

## 2024-08-13 PROCEDURE — 86225 DNA ANTIBODY NATIVE: CPT | Performed by: INTERNAL MEDICINE

## 2024-08-13 PROCEDURE — 99232 SBSQ HOSP IP/OBS MODERATE 35: CPT | Mod: ,,, | Performed by: FAMILY MEDICINE

## 2024-08-13 PROCEDURE — 80053 COMPREHEN METABOLIC PANEL: CPT | Performed by: INTERNAL MEDICINE

## 2024-08-13 PROCEDURE — 84165 PROTEIN E-PHORESIS SERUM: CPT | Mod: 26,,, | Performed by: PATHOLOGY

## 2024-08-13 PROCEDURE — 86038 ANTINUCLEAR ANTIBODIES: CPT | Performed by: INTERNAL MEDICINE

## 2024-08-13 PROCEDURE — 30233N1 TRANSFUSION OF NONAUTOLOGOUS RED BLOOD CELLS INTO PERIPHERAL VEIN, PERCUTANEOUS APPROACH: ICD-10-PCS | Performed by: FAMILY MEDICINE

## 2024-08-13 PROCEDURE — 25000003 PHARM REV CODE 250: Performed by: FAMILY MEDICINE

## 2024-08-13 PROCEDURE — 63600175 PHARM REV CODE 636 W HCPCS: Performed by: FAMILY MEDICINE

## 2024-08-13 PROCEDURE — 25000003 PHARM REV CODE 250: Performed by: HOSPITALIST

## 2024-08-13 PROCEDURE — 86235 NUCLEAR ANTIGEN ANTIBODY: CPT | Performed by: INTERNAL MEDICINE

## 2024-08-13 PROCEDURE — 86235 NUCLEAR ANTIGEN ANTIBODY: CPT | Mod: 90 | Performed by: INTERNAL MEDICINE

## 2024-08-13 PROCEDURE — 85025 COMPLETE CBC W/AUTO DIFF WBC: CPT | Performed by: INTERNAL MEDICINE

## 2024-08-13 PROCEDURE — 63600175 PHARM REV CODE 636 W HCPCS: Performed by: HOSPITALIST

## 2024-08-13 RX ORDER — FLUTICASONE PROPIONATE 50 MCG
2 SPRAY, SUSPENSION (ML) NASAL DAILY
Status: DISCONTINUED | OUTPATIENT
Start: 2024-08-14 | End: 2024-08-14 | Stop reason: HOSPADM

## 2024-08-13 RX ORDER — HYDROCODONE BITARTRATE AND ACETAMINOPHEN 500; 5 MG/1; MG/1
TABLET ORAL
Status: DISCONTINUED | OUTPATIENT
Start: 2024-08-13 | End: 2024-08-14 | Stop reason: HOSPADM

## 2024-08-13 RX ADMIN — BUSPIRONE HYDROCHLORIDE 5 MG: 5 TABLET ORAL at 08:08

## 2024-08-13 RX ADMIN — HEPARIN SODIUM 5000 UNITS: 5000 INJECTION, SOLUTION INTRAVENOUS; SUBCUTANEOUS at 05:08

## 2024-08-13 RX ADMIN — SEVELAMER CARBONATE 1600 MG: 800 TABLET, FILM COATED ORAL at 08:08

## 2024-08-13 RX ADMIN — MORPHINE SULFATE 4 MG: 4 INJECTION, SOLUTION INTRAMUSCULAR; INTRAVENOUS at 08:08

## 2024-08-13 RX ADMIN — MIRTAZAPINE 7.5 MG: 7.5 TABLET, FILM COATED ORAL at 09:08

## 2024-08-13 RX ADMIN — OXYCODONE AND ACETAMINOPHEN 1 TABLET: 10; 325 TABLET ORAL at 07:08

## 2024-08-13 RX ADMIN — MEROPENEM 500 MG: 500 INJECTION INTRAVENOUS at 05:08

## 2024-08-13 RX ADMIN — MUPIROCIN: 20 OINTMENT TOPICAL at 09:08

## 2024-08-13 RX ADMIN — MORPHINE SULFATE 4 MG: 4 INJECTION, SOLUTION INTRAMUSCULAR; INTRAVENOUS at 01:08

## 2024-08-13 RX ADMIN — MORPHINE SULFATE 4 MG: 4 INJECTION, SOLUTION INTRAMUSCULAR; INTRAVENOUS at 10:08

## 2024-08-13 RX ADMIN — HEPARIN SODIUM 5000 UNITS: 5000 INJECTION, SOLUTION INTRAVENOUS; SUBCUTANEOUS at 09:08

## 2024-08-13 RX ADMIN — BUPROPION HYDROCHLORIDE 100 MG: 100 TABLET, FILM COATED ORAL at 09:08

## 2024-08-13 RX ADMIN — MORPHINE SULFATE 4 MG: 4 INJECTION, SOLUTION INTRAMUSCULAR; INTRAVENOUS at 05:08

## 2024-08-13 RX ADMIN — OXYCODONE AND ACETAMINOPHEN 1 TABLET: 10; 325 TABLET ORAL at 11:08

## 2024-08-13 RX ADMIN — MORPHINE SULFATE 4 MG: 4 INJECTION, SOLUTION INTRAMUSCULAR; INTRAVENOUS at 02:08

## 2024-08-13 RX ADMIN — FAMOTIDINE 20 MG: 20 TABLET, FILM COATED ORAL at 08:08

## 2024-08-13 RX ADMIN — SEVELAMER CARBONATE 1600 MG: 800 TABLET, FILM COATED ORAL at 05:08

## 2024-08-13 RX ADMIN — OXYCODONE AND ACETAMINOPHEN 1 TABLET: 10; 325 TABLET ORAL at 05:08

## 2024-08-13 RX ADMIN — SEVELAMER CARBONATE 1600 MG: 800 TABLET, FILM COATED ORAL at 11:08

## 2024-08-13 NOTE — SUBJECTIVE & OBJECTIVE
Interval History:  Patient feeling better.  Notes that she has had bloody sinus drainage for weeks to months.  Mild sinus tenderness on exam.  CT done which showed inflammatory sinus disease bilaterally    Review of Systems  Objective:     Vital Signs (Most Recent):  Temp: 97.3 °F (36.3 °C) (08/13/24 1111)  Pulse: 109 (08/13/24 1111)  Resp: 18 (08/13/24 1345)  BP: (!) 144/105 (08/13/24 1111)  SpO2: 100 % (08/13/24 1111) Vital Signs (24h Range):  Temp:  [97.3 °F (36.3 °C)-99.3 °F (37.4 °C)] 97.3 °F (36.3 °C)  Pulse:  [104-112] 109  Resp:  [18-19] 18  SpO2:  [95 %-100 %] 100 %  BP: (111-144)/() 144/105     Weight: 68 kg (150 lb)  Body mass index is 22.15 kg/m².    Intake/Output Summary (Last 24 hours) at 8/13/2024 1635  Last data filed at 8/13/2024 0128  Gross per 24 hour   Intake --   Output 1 ml   Net -1 ml         Physical Exam  Vitals reviewed.   HENT:      Head: Normocephalic.      Comments: Tender over bilateral frontal sinuses     Right Ear: External ear normal.      Left Ear: External ear normal.   Eyes:      Conjunctiva/sclera: Conjunctivae normal.   Cardiovascular:      Rate and Rhythm: Normal rate and regular rhythm.   Pulmonary:      Effort: Pulmonary effort is normal.      Breath sounds: Normal breath sounds.   Abdominal:      General: Bowel sounds are normal.      Palpations: Abdomen is soft.   Musculoskeletal:      Cervical back: Normal range of motion.   Skin:     Comments: Wounds to sacrum and lower ext, see media for details    Neurological:      Mental Status: She is alert. Mental status is at baseline.   Psychiatric:         Mood and Affect: Mood normal.             Significant Labs: All pertinent labs within the past 24 hours have been reviewed.  BMP:   Recent Labs   Lab 08/13/24  0343   GLU 99   *   K 4.5   CL 99   CO2 27   BUN 44*   CREATININE 4.99*   CALCIUM 8.3*     CBC:   Recent Labs   Lab 08/13/24  0343   WBC 5.31   HGB 6.9*   HCT 22.9*          Significant Imaging: I have  reviewed all pertinent imaging results/findings within the past 24 hours.  CT with bilateral paranasal sinus disease

## 2024-08-13 NOTE — PLAN OF CARE
08/13/24 @ 1605 - Pt to continue IV ABX for another day. Plan to d/c back to [facility] tomorrow. CM will continue to follow.

## 2024-08-13 NOTE — ASSESSMENT & PLAN NOTE
"This patient does have evidence of infective focus  My overall impression is sepsis.  Source: Could be from HD  or skin   Antibiotics given-   Antibiotics (72h ago, onward)      Start     Stop Route Frequency Ordered    08/12/24 1700  meropenem (MERREM) 500 mg in 0.9% NaCl 100 mL IVPB (MB+)         -- IV Daily 08/12/24 0913    08/10/24 2215  mupirocin 2 % ointment         08/15/24 2059 Nasl 2 times daily 08/10/24 2101    08/10/24 2026  vancomycin - pharmacy to dose         -- IV pharmacy to manage frequency 08/10/24 1926          Latest lactate reviewed-  No results for input(s): "LACTATE", "POCLAC" in the last 72 hours.    Organ dysfunction indicated by     Fluid challenge Contraindicated- Fluid bolus is contraindicated in this patient due to End Stage Liver Disease     Post- resuscitation assessment Yes Perfusion exam was performed within 6 hours of septic shock presentation after bolus shows Adequate tissue perfusion assessed by non-invasive monitoring       Will stable Pressors- Levophed for MAP of 65  Source control achieved by antibiotics.      8/12 - cefepime change to Merrem for ESBL E coli in urine.  Blood cultures negative at 24 hours.  8/13 - Complete abx. If blood cx remain negative, d/c tomorrow.    "

## 2024-08-13 NOTE — SUBJECTIVE & OBJECTIVE
Interval History:  Patient moved from ICU to the floor.  She reports she is breathing okay.  Urine culture growing out an ESBL organism.  Switched to Merrem    Review of Systems  Objective:     Vital Signs (Most Recent):  Temp: 98.2 °F (36.8 °C) (08/12/24 1558)  Pulse: (!) 114 (08/12/24 1558)  Resp: 18 (08/12/24 1737)  BP: 135/86 (08/12/24 1558)  SpO2: 98 % (08/12/24 1900) Vital Signs (24h Range):  Temp:  [97.2 °F (36.2 °C)-98.9 °F (37.2 °C)] 98.2 °F (36.8 °C)  Pulse:  [] 114  Resp:  [16-20] 18  SpO2:  [95 %-100 %] 98 %  BP: (102-144)/(54-91) 135/86     Weight: 68 kg (150 lb)  Body mass index is 22.15 kg/m².    Intake/Output Summary (Last 24 hours) at 8/12/2024 1929  Last data filed at 8/12/2024 1558  Gross per 24 hour   Intake 0 ml   Output 3000 ml   Net -3000 ml         Physical Exam  Vitals reviewed.   HENT:      Head: Normocephalic.      Right Ear: External ear normal.      Left Ear: External ear normal.   Eyes:      Conjunctiva/sclera: Conjunctivae normal.   Cardiovascular:      Rate and Rhythm: Normal rate and regular rhythm.   Pulmonary:      Effort: Pulmonary effort is normal.      Breath sounds: Normal breath sounds.   Abdominal:      General: Bowel sounds are normal.      Palpations: Abdomen is soft.   Musculoskeletal:      Cervical back: Normal range of motion.   Skin:     Comments: Wounds to sacrum and lower ext, see media for details    Neurological:      Mental Status: She is alert. Mental status is at baseline.   Psychiatric:         Mood and Affect: Mood normal.             Significant Labs: All pertinent labs within the past 24 hours have been reviewed.  BMP:   Recent Labs   Lab 08/11/24  0537   *   *   K 5.1      CO2 25   BUN 56*   CREATININE 6.11*   CALCIUM 8.2*   MG 1.9     CBC:   Recent Labs   Lab 08/11/24  0537   WBC 14.20*   HGB 7.4*   HCT 25.0*          Significant Imaging: I have reviewed all pertinent imaging results/findings within the past 24 hours.

## 2024-08-13 NOTE — ASSESSMENT & PLAN NOTE
This patient does have evidence of infective focus  My overall impression is sepsis.  Source: Could be from HD  or skin   Antibiotics given-   Antibiotics (72h ago, onward)      Start     Stop Route Frequency Ordered    08/12/24 1700  meropenem (MERREM) 500 mg in 0.9% NaCl 100 mL IVPB (MB+)         -- IV Daily 08/12/24 0913    08/10/24 2215  mupirocin 2 % ointment         08/15/24 2059 Nasl 2 times daily 08/10/24 2101    08/10/24 2026  vancomycin - pharmacy to dose         -- IV pharmacy to manage frequency 08/10/24 1926          Latest lactate reviewed-  Recent Labs   Lab 08/10/24  1457   LACTATE 1.5       Organ dysfunction indicated by     Fluid challenge Contraindicated- Fluid bolus is contraindicated in this patient due to End Stage Liver Disease     Post- resuscitation assessment Yes Perfusion exam was performed within 6 hours of septic shock presentation after bolus shows Adequate tissue perfusion assessed by non-invasive monitoring       Will stable Pressors- Levophed for MAP of 65  Source control achieved by antibiotics.      8/12 - cefepime change to Merrem for ESBL E coli in urine.  Blood cultures negative at 24 hours.

## 2024-08-13 NOTE — PROGRESS NOTES
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  Temp was 103.  WBC  14 but lactic acid 1.5.  She has 16 bands and her K is 8 with EKG showing tachycardia with low voltage widened QRS.  Nephrology consulted and came to ED for emergent HD.  Patient was diagnosed with COVID last week and she has not been feeling well.  UA does not look infectious.  ED NP got BC x2 before giving rocephin.  CXR shows no acute process.      Patient is a long term wound care patient and wounds appear improved from our last encounter.  I have changed to cefepime and added vanco to antibiotic regimen.  She has some confusion and lethargy at this time and will try to get more info after HD and when afebrile.  Last time when I admitted her she was very alert and oriented and we talked about her two children.  She says that she has not felt well for about a week when she got diagnosed with COVID but denies N/V/D.  She normally is able to ambulate with a walker but she is so weak at this time she can only move her BLE to gravity.  BUE 4/5 but she is able to hold the remote control and the juice cup.  She is hungry at this time and is feeling better after HD.  She has not been for her past three HD visits and was sent directly to the ED from HD today with AMS and fever.      Patient has a LUE AV fistula that has audible bruit and palpable thrill and is not tender or erythematous. Wounds are healing but ESR CRP pending as are blood cultures and will continue to cover with cefepime  and vanco until cultures return.      Remainder of ROS as below.  See assessment and plan below for problem based evaluation      Overview/Hospital Course:  No notes on file    Interval History:  Patient feeling better.  Notes that she has had bloody sinus drainage for weeks to months.  Mild sinus tenderness on exam.  CT done which showed inflammatory sinus disease bilaterally    Review of Systems  Objective:     Vital Signs (Most Recent):  Temp: 97.3 °F (36.3 °C) (08/13/24 1111)  Pulse: 109 (08/13/24 1111)  Resp: 18 (08/13/24 1345)  BP: (!) 144/105 (08/13/24 1111)  SpO2: 100 % (08/13/24 1111) Vital Signs (24h Range):  Temp:  [97.3 °F (36.3 °C)-99.3 °F (37.4 °C)] 97.3 °F (36.3 °C)  Pulse:  [104-112] 109  Resp:  [18-19] 18  SpO2:  [95 %-100 %] 100 %  BP: (111-144)/() 144/105     Weight: 68 kg (150 lb)  Body mass index is 22.15 kg/m².    Intake/Output Summary (Last 24 hours) at 8/13/2024 1635  Last data filed at 8/13/2024 0128  Gross per 24 hour   Intake --   Output 1 ml   Net -1 ml         Physical Exam  Vitals reviewed.   HENT:      Head: Normocephalic.      Comments: Tender over bilateral frontal sinuses     Right Ear: External ear normal.      Left Ear: External ear normal.   Eyes:      Conjunctiva/sclera: Conjunctivae normal.   Cardiovascular:      Rate and Rhythm: Normal rate and regular rhythm.   Pulmonary:      Effort: Pulmonary effort is normal.      Breath sounds: Normal breath sounds.   Abdominal:      General: Bowel sounds are normal.      Palpations: Abdomen is soft.   Musculoskeletal:      Cervical back: Normal range of motion.   Skin:     Comments: Wounds to sacrum and lower ext, see media for details    Neurological:      Mental Status: She is alert. Mental status is at baseline.   Psychiatric:         Mood and Affect: Mood normal.             Significant Labs: All pertinent labs within the past 24 hours have been reviewed.  BMP:   Recent Labs   Lab 08/13/24  0343   GLU 99   *   K 4.5   CL  "99   CO2 27   BUN 44*   CREATININE 4.99*   CALCIUM 8.3*     CBC:   Recent Labs   Lab 08/13/24  0343   WBC 5.31   HGB 6.9*   HCT 22.9*          Significant Imaging: I have reviewed all pertinent imaging results/findings within the past 24 hours.  CT with bilateral paranasal sinus disease    Assessment/Plan:      * Sepsis  This patient does have evidence of infective focus  My overall impression is sepsis.  Source: Could be from HD  or skin   Antibiotics given-   Antibiotics (72h ago, onward)      Start     Stop Route Frequency Ordered    08/12/24 1700  meropenem (MERREM) 500 mg in 0.9% NaCl 100 mL IVPB (MB+)         -- IV Daily 08/12/24 0913    08/10/24 2215  mupirocin 2 % ointment         08/15/24 2059 Nasl 2 times daily 08/10/24 2101    08/10/24 2026  vancomycin - pharmacy to dose         -- IV pharmacy to manage frequency 08/10/24 1926          Latest lactate reviewed-  No results for input(s): "LACTATE", "POCLAC" in the last 72 hours.    Organ dysfunction indicated by     Fluid challenge Contraindicated- Fluid bolus is contraindicated in this patient due to End Stage Liver Disease     Post- resuscitation assessment Yes Perfusion exam was performed within 6 hours of septic shock presentation after bolus shows Adequate tissue perfusion assessed by non-invasive monitoring       Will stable Pressors- Levophed for MAP of 65  Source control achieved by antibiotics.      8/12 - cefepime change to Merrem for ESBL E coli in urine.  Blood cultures negative at 24 hours.  8/13 - Complete abx. If blood cx remain negative, d/c tomorrow.      ESRD (end stage renal disease) on dialysis  Creatine stable for now. BMP reviewed- noted Estimated Creatinine Clearance: 8.5 mL/min (A) (based on SCr of 9.78 mg/dL (H)). according to latest data. Based on current GFR, CKD stage is end stage.  Monitor UOP and serial BMP and adjust therapy as needed. Renally dose meds. Avoid nephrotoxic medications and procedures.    Appreciate " nephrology's assistance    Chronic sinusitis of both maxillary sinuses    Start flonase    Atypical anxiety disorder  Wellbutrin q am and buspar q pm continue home meds.  And remeron q hs      Asthma  PRN  inhaled bronchodilators      GERD (gastroesophageal reflux disease)  Continue OP pepcid      Severe protein-calorie malnutrition  Nutrition consulted. Most recent weight and BMI monitored-     Measurements:  Wt Readings from Last 1 Encounters:   08/10/24 68 kg (150 lb)   Body mass index is 22.15 kg/m².    Patient has been screened and assessed by RD.    Malnutrition Type:   will check PAB and nutrition consult  Context:    Level:      Malnutrition Characteristic Summary:       Interventions/Recommendations (treatment strategy):         Sacral decubitus ulcer      Wound care nurse consulted    Calciphylaxis with nonhealing ulcer of leg  Has seen ID specialist  Dr. Arsenio Hoyos at [facility].        SLE (systemic lupus erythematosus)  Not currently on autoimmune agents or steroids       Anemia in chronic kidney disease  Anemia is likely due to chronic disease due to ESRD. Most recent hemoglobin and hematocrit are listed below.  Recent Labs     08/10/24  1457   HGB 8.6*   HCT 28.5*     Plan  - Monitor serial CBC: Daily  - Transfuse PRBC if patient becomes hemodynamically unstable, symptomatic or H/H drops below 7/21.  - Patient has not received any PRBC transfusions to date  - Patient's anemia is currently stable  -       VTE Risk Mitigation (From admission, onward)           Ordered     heparin (porcine) injection 5,000 Units  Every 8 hours         08/11/24 0406                    Discharge Planning   ESTHER:      Code Status: Full Code   Is the patient medically ready for discharge?:     Reason for patient still in hospital (select all that apply): Treatment  Discharge Plan A: Return to nursing home                  Ben Bee Jr, MD  Department of Hospital Medicine   [facility] [address] [facility]  Telemetry

## 2024-08-13 NOTE — PROGRESS NOTES
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  Temp was 103.  WBC  14 but lactic acid 1.5.  She has 16 bands and her K is 8 with EKG showing tachycardia with low voltage widened QRS.  Nephrology consulted and came to ED for emergent HD.  Patient was diagnosed with COVID last week and she has not been feeling well.  UA does not look infectious.  ED NP got BC x2 before giving rocephin.  CXR shows no acute process.      Patient is a long term wound care patient and wounds appear improved from our last encounter.  I have changed to cefepime and added vanco to antibiotic regimen.  She has some confusion and lethargy at this time and will try to get more info after HD and when afebrile.  Last time when I admitted her she was very alert and oriented and we talked about her two children.  She says that she has not felt well for about a week when she got diagnosed with COVID but denies N/V/D.  She normally is able to ambulate with a walker but she is so weak at this time she can only move her BLE to gravity.  BUE 4/5 but she is able to hold the remote control and the juice cup.  She is hungry at this time and is feeling better after HD.  She has not been for her past three HD visits and was sent directly to the ED from HD today with AMS and fever.      Patient has a LUE AV fistula that has audible bruit and palpable thrill and is not tender or erythematous. Wounds are healing but ESR CRP pending as are blood cultures and will continue to cover with cefepime  and vanco until cultures return.      Remainder of ROS as below.  See assessment and plan below for problem based evaluation      Overview/Hospital Course:  No notes on file    Interval History:  Patient moved from ICU to the floor.  She reports she is breathing okay.  Urine culture growing out an ESBL organism.  Switched to Merrem    Review of Systems  Objective:     Vital Signs (Most Recent):  Temp: 98.2 °F (36.8 °C) (08/12/24 1558)  Pulse: (!) 114 (08/12/24 1558)  Resp: 18 (08/12/24 1737)  BP: 135/86 (08/12/24 1558)  SpO2: 98 % (08/12/24 1900) Vital Signs (24h Range):  Temp:  [97.2 °F (36.2 °C)-98.9 °F (37.2 °C)] 98.2 °F (36.8 °C)  Pulse:  [] 114  Resp:  [16-20] 18  SpO2:  [95 %-100 %] 98 %  BP: (102-144)/(54-91) 135/86     Weight: 68 kg (150 lb)  Body mass index is 22.15 kg/m².    Intake/Output Summary (Last 24 hours) at 8/12/2024 1929  Last data filed at 8/12/2024 1558  Gross per 24 hour   Intake 0 ml   Output 3000 ml   Net -3000 ml         Physical Exam  Vitals reviewed.   HENT:      Head: Normocephalic.      Right Ear: External ear normal.      Left Ear: External ear normal.   Eyes:      Conjunctiva/sclera: Conjunctivae normal.   Cardiovascular:      Rate and Rhythm: Normal rate and regular rhythm.   Pulmonary:      Effort: Pulmonary effort is normal.      Breath sounds: Normal breath sounds.   Abdominal:      General: Bowel sounds are normal.      Palpations: Abdomen is soft.   Musculoskeletal:      Cervical back: Normal range of motion.   Skin:     Comments: Wounds to sacrum and lower ext, see media for details    Neurological:      Mental Status: She is alert. Mental status is at baseline.   Psychiatric:         Mood and Affect: Mood normal.             Significant Labs: All pertinent labs within the past 24 hours have been reviewed.  BMP:   Recent Labs   Lab 08/11/24  0537   *   *   K 5.1      CO2 25   BUN 56*   CREATININE 6.11*   CALCIUM 8.2*   MG 1.9     CBC:   Recent Labs   Lab  08/11/24  0537   WBC 14.20*   HGB 7.4*   HCT 25.0*          Significant Imaging: I have reviewed all pertinent imaging results/findings within the past 24 hours.    Assessment/Plan:      * Sepsis  This patient does have evidence of infective focus  My overall impression is sepsis.  Source: Could be from HD  or skin   Antibiotics given-   Antibiotics (72h ago, onward)      Start     Stop Route Frequency Ordered    08/12/24 1700  meropenem (MERREM) 500 mg in 0.9% NaCl 100 mL IVPB (MB+)         -- IV Daily 08/12/24 0913    08/10/24 2215  mupirocin 2 % ointment         08/15/24 2059 Nasl 2 times daily 08/10/24 2101    08/10/24 2026  vancomycin - pharmacy to dose         -- IV pharmacy to manage frequency 08/10/24 1926          Latest lactate reviewed-  Recent Labs   Lab 08/10/24  1457   LACTATE 1.5       Organ dysfunction indicated by     Fluid challenge Contraindicated- Fluid bolus is contraindicated in this patient due to End Stage Liver Disease     Post- resuscitation assessment Yes Perfusion exam was performed within 6 hours of septic shock presentation after bolus shows Adequate tissue perfusion assessed by non-invasive monitoring       Will stable Pressors- Levophed for MAP of 65  Source control achieved by antibiotics.      8/12 - cefepime change to Merrem for ESBL E coli in urine.  Blood cultures negative at 24 hours.    ESRD (end stage renal disease) on dialysis  Creatine stable for now. BMP reviewed- noted Estimated Creatinine Clearance: 8.5 mL/min (A) (based on SCr of 9.78 mg/dL (H)). according to latest data. Based on current GFR, CKD stage is end stage.  Monitor UOP and serial BMP and adjust therapy as needed. Renally dose meds. Avoid nephrotoxic medications and procedures.    Appreciate nephrology's assistance    Atypical anxiety disorder  Wellbutrin q am and buspar q pm continue home meds.  And remeron q hs      Asthma  PRN  inhaled bronchodilators      GERD (gastroesophageal reflux  disease)  Continue OP pepcid      Severe protein-calorie malnutrition  Nutrition consulted. Most recent weight and BMI monitored-     Measurements:  Wt Readings from Last 1 Encounters:   08/10/24 68 kg (150 lb)   Body mass index is 22.15 kg/m².    Patient has been screened and assessed by RD.    Malnutrition Type:   will check PAB and nutrition consult  Context:    Level:      Malnutrition Characteristic Summary:       Interventions/Recommendations (treatment strategy):         Sacral decubitus ulcer      Wound care nurse consulted    Calciphylaxis with nonhealing ulcer of leg  Has seen ID specialist  Dr. Arsenio Hoyos at [facility].        SLE (systemic lupus erythematosus)  Not currently on autoimmune agents or steroids       Anemia in chronic kidney disease  Anemia is likely due to chronic disease due to ESRD. Most recent hemoglobin and hematocrit are listed below.  Recent Labs     08/10/24  1457   HGB 8.6*   HCT 28.5*     Plan  - Monitor serial CBC: Daily  - Transfuse PRBC if patient becomes hemodynamically unstable, symptomatic or H/H drops below 7/21.  - Patient has not received any PRBC transfusions to date  - Patient's anemia is currently stable  -       VTE Risk Mitigation (From admission, onward)           Ordered     heparin (porcine) injection 5,000 Units  Every 8 hours         08/11/24 0406                    Discharge Planning   ESTHER:      Code Status: Full Code   Is the patient medically ready for discharge?:     Reason for patient still in hospital (select all that apply): Treatment  Discharge Plan A: Return to nursing home                  Ben Bee Jr, MD  Department of Hospital Medicine   [facility] Telemetry

## 2024-08-14 VITALS
TEMPERATURE: 98 F | HEIGHT: 69 IN | SYSTOLIC BLOOD PRESSURE: 174 MMHG | BODY MASS INDEX: 22.21 KG/M2 | RESPIRATION RATE: 18 BRPM | DIASTOLIC BLOOD PRESSURE: 101 MMHG | HEART RATE: 99 BPM | WEIGHT: 149.94 LBS | OXYGEN SATURATION: 98 %

## 2024-08-14 DIAGNOSIS — U07.1 COVID-19 VIRUS DETECTED: ICD-10-CM

## 2024-08-14 LAB
ABO + RH BLD: NORMAL
ABO + RH BLD: NORMAL
ALBUMIN SERPL BCP-MCNC: 1.8 G/DL (ref 3.5–5)
ALBUMIN/GLOB SERPL: 0.3 {RATIO}
ALP SERPL-CCNC: 95 U/L (ref 37–98)
ALT SERPL W P-5'-P-CCNC: 8 U/L (ref 13–56)
ANION GAP SERPL CALCULATED.3IONS-SCNC: 11 MMOL/L (ref 7–16)
AST SERPL W P-5'-P-CCNC: 21 U/L (ref 15–37)
BASOPHILS # BLD AUTO: 0.02 K/UL (ref 0–0.2)
BASOPHILS NFR BLD AUTO: 0.4 % (ref 0–1)
BILIRUB SERPL-MCNC: 0.5 MG/DL (ref ?–1.2)
BLD PROD TYP BPU: NORMAL
BLD PROD TYP BPU: NORMAL
BLOOD UNIT EXPIRATION DATE: NORMAL
BLOOD UNIT EXPIRATION DATE: NORMAL
BLOOD UNIT TYPE CODE: 6200
BLOOD UNIT TYPE CODE: 6200
BUN SERPL-MCNC: 53 MG/DL (ref 7–18)
BUN/CREAT SERPL: 9 (ref 6–20)
CALCIUM SERPL-MCNC: 7.7 MG/DL (ref 8.5–10.1)
CHLORIDE SERPL-SCNC: 99 MMOL/L (ref 98–107)
CO2 SERPL-SCNC: 25 MMOL/L (ref 21–32)
CREAT SERPL-MCNC: 6.09 MG/DL (ref 0.55–1.02)
CROSSMATCH INTERPRETATION: NORMAL
CROSSMATCH INTERPRETATION: NORMAL
DIFFERENTIAL METHOD BLD: ABNORMAL
DISPENSE STATUS: NORMAL
DISPENSE STATUS: NORMAL
EGFR (NO RACE VARIABLE) (RUSH/TITUS): 9 ML/MIN/1.73M2
EOSINOPHIL # BLD AUTO: 0.22 K/UL (ref 0–0.5)
EOSINOPHIL NFR BLD AUTO: 4 % (ref 1–4)
ERYTHROCYTE [DISTWIDTH] IN BLOOD BY AUTOMATED COUNT: 19.8 % (ref 11.5–14.5)
FERRITIN SERPL-MCNC: 1486 NG/ML (ref 8–252)
FOLATE SERPL-MCNC: 3.2 NG/ML (ref 3.1–17.5)
GLOBULIN SER-MCNC: 6.6 G/DL (ref 2–4)
GLUCOSE SERPL-MCNC: 105 MG/DL (ref 74–106)
GLUCOSE SERPL-MCNC: 119 MG/DL (ref 70–105)
HCT VFR BLD AUTO: 22.3 % (ref 38–47)
HGB BLD-MCNC: 6.7 G/DL (ref 12–16)
IMM GRANULOCYTES # BLD AUTO: 0.07 K/UL (ref 0–0.04)
IMM GRANULOCYTES NFR BLD: 1.3 % (ref 0–0.4)
INDIRECT COOMBS: NORMAL
IRON SATN MFR SERPL: 16 % (ref 14–50)
IRON SERPL-MCNC: 33 ΜG/DL (ref 50–170)
LYMPHOCYTES # BLD AUTO: 1.14 K/UL (ref 1–4.8)
LYMPHOCYTES NFR BLD AUTO: 20.8 % (ref 27–41)
MCH RBC QN AUTO: 27.1 PG (ref 27–31)
MCHC RBC AUTO-ENTMCNC: 30 G/DL (ref 32–36)
MCV RBC AUTO: 90.3 FL (ref 80–96)
MONOCYTES # BLD AUTO: 0.44 K/UL (ref 0–0.8)
MONOCYTES NFR BLD AUTO: 8 % (ref 2–6)
MPC BLD CALC-MCNC: 10.4 FL (ref 9.4–12.4)
NEUTROPHILS # BLD AUTO: 3.6 K/UL (ref 1.8–7.7)
NEUTROPHILS NFR BLD AUTO: 65.5 % (ref 53–65)
NRBC # BLD AUTO: 0 X10E3/UL
NRBC, AUTO (.00): 0 %
PLATELET # BLD AUTO: 208 K/UL (ref 150–400)
POTASSIUM SERPL-SCNC: 4.8 MMOL/L (ref 3.5–5.1)
PROT SERPL-MCNC: 8.4 G/DL (ref 6.4–8.2)
RBC # BLD AUTO: 2.47 M/UL (ref 4.2–5.4)
RH BLD: NORMAL
SODIUM SERPL-SCNC: 130 MMOL/L (ref 136–145)
SPECIMEN OUTDATE: NORMAL
TIBC SERPL-MCNC: 212 ΜG/DL (ref 250–450)
UNIT NUMBER: NORMAL
UNIT NUMBER: NORMAL
VIT B12 SERPL-MCNC: 751 PG/ML (ref 193–986)
WBC # BLD AUTO: 5.49 K/UL (ref 4.5–11)

## 2024-08-14 PROCEDURE — 63600175 PHARM REV CODE 636 W HCPCS: Performed by: HOSPITALIST

## 2024-08-14 PROCEDURE — 80100014 HC HEMODIALYSIS 1:1

## 2024-08-14 PROCEDURE — 25000003 PHARM REV CODE 250: Performed by: HOSPITALIST

## 2024-08-14 PROCEDURE — 83540 ASSAY OF IRON: CPT | Performed by: INTERNAL MEDICINE

## 2024-08-14 PROCEDURE — 99239 HOSP IP/OBS DSCHRG MGMT >30: CPT | Mod: ,,, | Performed by: FAMILY MEDICINE

## 2024-08-14 PROCEDURE — 63600175 PHARM REV CODE 636 W HCPCS: Performed by: NURSE PRACTITIONER

## 2024-08-14 PROCEDURE — 82728 ASSAY OF FERRITIN: CPT | Performed by: INTERNAL MEDICINE

## 2024-08-14 PROCEDURE — 25000003 PHARM REV CODE 250: Performed by: INTERNAL MEDICINE

## 2024-08-14 PROCEDURE — 36415 COLL VENOUS BLD VENIPUNCTURE: CPT | Performed by: INTERNAL MEDICINE

## 2024-08-14 PROCEDURE — 86900 BLOOD TYPING SEROLOGIC ABO: CPT | Performed by: INTERNAL MEDICINE

## 2024-08-14 PROCEDURE — 86923 COMPATIBILITY TEST ELECTRIC: CPT | Performed by: INTERNAL MEDICINE

## 2024-08-14 PROCEDURE — 82746 ASSAY OF FOLIC ACID SERUM: CPT | Performed by: INTERNAL MEDICINE

## 2024-08-14 PROCEDURE — 80053 COMPREHEN METABOLIC PANEL: CPT | Performed by: INTERNAL MEDICINE

## 2024-08-14 PROCEDURE — 86850 RBC ANTIBODY SCREEN: CPT | Performed by: INTERNAL MEDICINE

## 2024-08-14 PROCEDURE — P9016 RBC LEUKOCYTES REDUCED: HCPCS | Performed by: INTERNAL MEDICINE

## 2024-08-14 PROCEDURE — 85025 COMPLETE CBC W/AUTO DIFF WBC: CPT | Performed by: INTERNAL MEDICINE

## 2024-08-14 PROCEDURE — 86901 BLOOD TYPING SEROLOGIC RH(D): CPT | Performed by: INTERNAL MEDICINE

## 2024-08-14 PROCEDURE — 82962 GLUCOSE BLOOD TEST: CPT

## 2024-08-14 RX ORDER — SODIUM CHLORIDE 9 MG/ML
INJECTION, SOLUTION INTRAVENOUS
Status: ACTIVE | OUTPATIENT
Start: 2024-08-14 | End: 2024-08-14

## 2024-08-14 RX ORDER — FLUTICASONE PROPIONATE 50 MCG
2 SPRAY, SUSPENSION (ML) NASAL DAILY
Start: 2024-08-15

## 2024-08-14 RX ADMIN — POLYETHYLENE GLYCOL 3350 17 G: 17 POWDER, FOR SOLUTION ORAL at 09:08

## 2024-08-14 RX ADMIN — FAMOTIDINE 20 MG: 20 TABLET, FILM COATED ORAL at 09:08

## 2024-08-14 RX ADMIN — SODIUM CHLORIDE 2000 ML: 9 INJECTION, SOLUTION INTRAVENOUS at 01:08

## 2024-08-14 RX ADMIN — HEPARIN SODIUM 5000 UNITS: 5000 INJECTION, SOLUTION INTRAVENOUS; SUBCUTANEOUS at 05:08

## 2024-08-14 RX ADMIN — MORPHINE SULFATE 4 MG: 4 INJECTION, SOLUTION INTRAMUSCULAR; INTRAVENOUS at 05:08

## 2024-08-14 RX ADMIN — OXYCODONE AND ACETAMINOPHEN 1 TABLET: 10; 325 TABLET ORAL at 03:08

## 2024-08-14 RX ADMIN — BUSPIRONE HYDROCHLORIDE 5 MG: 5 TABLET ORAL at 09:08

## 2024-08-14 RX ADMIN — OXYCODONE AND ACETAMINOPHEN 1 TABLET: 10; 325 TABLET ORAL at 02:08

## 2024-08-14 RX ADMIN — SEVELAMER CARBONATE 1600 MG: 800 TABLET, FILM COATED ORAL at 09:08

## 2024-08-14 NOTE — ASSESSMENT & PLAN NOTE
Hyperkalemia is likely due to ESRD.The patients most recent potassium results are listed below.  Recent Labs     08/13/24  0343 08/14/24  0410   K 4.5 4.8     Potassium holding in the normal range with hemodialysis.

## 2024-08-14 NOTE — PROGRESS NOTES
The patient has no complaints today    PE  Lungs-right base  Cor-3/6 systolic murmur  Abd-distended,soft,nontender   Ext-2+pitting pretibial edema    Imp-  CKD stage 6 on hemodialysis  Anemia   SLE  Severe tricuspid insufficiency  Hyponatremia    Plan:  Hemodialysis tomorrow   Transfuse PRBCS while on hemodialysis tomorrow

## 2024-08-14 NOTE — HOSPITAL COURSE
Patient is a 34-year-old  female with a multitude of medical problems requiring nursing home placement.  Patient had COVID last week and was too ill to go to hemodialysis.  She was sent to [facility] ER and had a potassium of 8 with EKG changes.  She was admitted to the ICU and hemodialysis performed.  Blood cultures were done and she was empirically treated for sepsis with vancomycin and cefepime.  Blood cultures returned negative.  Urine culture grew an ESBL E coli therefore cefepime change to meropenem.  Patient has chronic wounds related to her end-stage renal disease and calciphylaxis.  These did not appear to be acutely infected.  Incidentally patient reported chronic sinus problems and blood in nasal discharge.  CT of the sinuses did show pan sinus disease.  She was started on Flonase for chronic sinusitis.  Patient seen in consultation by Dr. Lerma who directed hemodialysis.  At this point patient has reached maximum hospital benefit in his ready for returned to nursing home.

## 2024-08-14 NOTE — ASSESSMENT & PLAN NOTE
Creatine stable for now. BMP reviewed- noted Estimated Creatinine Clearance: 13.6 mL/min (A) (based on SCr of 6.09 mg/dL (H)). according to latest data. Based on current GFR, CKD stage is end stage.  Monitor UOP and serial BMP and adjust therapy as needed. Renally dose meds. Avoid nephrotoxic medications and procedures.    Appreciate nephrology's assistance    8/14 - continue Tuesday Thursday Saturday hemodialysis

## 2024-08-14 NOTE — ASSESSMENT & PLAN NOTE
"This patient does have evidence of infective focus  My overall impression is sepsis.  Source: Could be from HD  or skin   Antibiotics given-   Antibiotics (72h ago, onward)      Start     Stop Route Frequency Ordered    08/12/24 1700  meropenem (MERREM) 500 mg in 0.9% NaCl 100 mL IVPB (MB+)         -- IV Daily 08/12/24 0913    08/10/24 2215  mupirocin 2 % ointment         08/15/24 2059 Nasl 2 times daily 08/10/24 2101    08/10/24 2026  vancomycin - pharmacy to dose         -- IV pharmacy to manage frequency 08/10/24 1926          Latest lactate reviewed-  No results for input(s): "LACTATE", "POCLAC" in the last 72 hours.    Organ dysfunction indicated by     Fluid challenge Contraindicated- Fluid bolus is contraindicated in this patient due to End Stage Liver Disease     Post- resuscitation assessment Yes Perfusion exam was performed within 6 hours of septic shock presentation after bolus shows Adequate tissue perfusion assessed by non-invasive monitoring       Will stable Pressors- Levophed for MAP of 65  Source control achieved by antibiotics.      8/12 - cefepime change to Merrem for ESBL E coli in urine.  Blood cultures negative at 24 hours.  8/13 - Complete abx. If blood cx remain negative, d/c tomorrow.  8/14 - sepsis resolved.  Blood cultures remain negative    "

## 2024-08-14 NOTE — DISCHARGE SUMMARY
[facility] - [address] [place] [address] Medicine  Discharge Summary      Patient Name: Madhav Correa  MRN: [id number]  AURE: [id number]  Patient Class: IP- Inpatient  Admission Date: 8/10/2024  Hospital Length of Stay: 4 days  Discharge Date and Time: No discharge date for patient encounter.  Attending Physician: Ben Bee Jr., MD   Discharging Provider: Ben Bee Jr, MD  Primary Care Provider: 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  Temp was 103.  WBC  14 but lactic acid 1.5.  She has 16 bands and her K is 8 with EKG showing tachycardia with low voltage widened QRS.  Nephrology consulted and came to ED for emergent HD.  Patient was diagnosed with COVID last week and she has not been feeling well.  UA does not look infectious.  ED NP got BC x2 before giving rocephin.  CXR shows no acute process.      Patient is a long term wound care patient and wounds appear improved from our last encounter.  I have changed to cefepime and added vanco to antibiotic regimen.  She has some confusion and lethargy at this time and will try to get more info after HD and when afebrile.  Last time when I admitted her she was very alert and oriented and we talked about her two children.  She says that she has not felt well for about a week when she got diagnosed with COVID but denies N/V/D.  She normally is able to ambulate with a walker but she is so weak at this time she can only move her BLE to gravity.  BUE 4/5 but she is able to hold the remote control and the juice cup.  She is hungry at this time and is feeling better after HD.  She has not been for her past three HD visits and was sent directly to the ED from HD today with AMS and fever.      Patient has a LUE AV fistula that has audible bruit and  palpable thrill and is not tender or erythematous. Wounds are healing but ESR CRP pending as are blood cultures and will continue to cover with cefepime and vanco until cultures return.      Remainder of ROS as below.  See assessment and plan below for problem based evaluation      * No surgery found *      Hospital Course:   Patient is a 34-year-old  female with a multitude of medical problems requiring nursing home placement.  Patient had COVID last week and was too ill to go to hemodialysis.  She was sent to [facility] ER and had a potassium of 8 with EKG changes.  She was admitted to the ICU and hemodialysis performed.  Blood cultures were done and she was empirically treated for sepsis with vancomycin and cefepime.  Blood cultures returned negative.  Urine culture grew an ESBL E coli therefore cefepime change to meropenem.  Patient has chronic wounds related to her end-stage renal disease and calciphylaxis.  These did not appear to be acutely infected.  Incidentally patient reported chronic sinus problems and blood in nasal discharge.  CT of the sinuses did show pan sinus disease.  She was started on Flonase for chronic sinusitis.  Patient seen in consultation by Dr. Lerma who directed hemodialysis.  At this point patient has reached maximum hospital benefit in his ready for returned to nursing home.     Goals of Care Treatment Preferences:  Code Status: Full Code      SDOH Screening:  The patient was screened for food insecurity, housing instability, transportation needs, utility difficulties, and interpersonal safety. The social determinant(s) of health identified as a concern this admission are:  Transportation difficulties    The plan to address these concerns is:  Case management    Social Determinants of Health with Concerns     Transportation Needs: Unmet Transportation Needs (8/12/2024)        Consults:   Consults (From admission, onward)          Status Ordering Provider     Pharmacy to dose  Vancomycin consult  Once        Provider:  (Not yet assigned)    Completed SUE JUNIOR            No new Assessment & Plan notes have been filed under this hospital service since the last note was generated.  Service: Hospital Medicine    Final Active Diagnoses:    Diagnosis Date Noted POA    PRINCIPAL PROBLEM:  Sepsis [A41.9] 08/10/2024 Yes    ESRD (end stage renal disease) on dialysis [N18.6, Z99.2]  Not Applicable    Chronic sinusitis of both maxillary sinuses [J32.0] 08/13/2024 Yes    COVID-19 [U07.1] 08/11/2024 Yes    GERD (gastroesophageal reflux disease) [K21.9]  Yes    Asthma [J45.909]  Yes    Atypical anxiety disorder [F41.9]  Yes    Hyperkalemia [E87.5] 08/10/2024 Yes    Sacral decubitus ulcer [L89.159] 04/18/2024 Yes    Anemia in chronic kidney disease [N18.9, D63.1]  Yes    SLE (systemic lupus erythematosus) [M32.9]  Yes    Calciphylaxis with nonhealing ulcer of leg [E83.59, L97.909]  Yes    Severe protein-calorie malnutrition [E43]  Yes      Problems Resolved During this Admission:       Discharged Condition: good    Disposition: Skilled Nursing Facility    Follow Up:    Patient Instructions:   No discharge procedures on file.    Significant Diagnostic Studies: N/A    Pending Diagnostic Studies:       Procedure Component Value Units Date/Time    Ab to Extractable Nuclear Ag ROXY Mitchell [[id number]] Collected: 08/13/24 0343    Order Status: Sent Lab Status: In process Updated: 08/13/24 1321    Specimen: Blood            Medications:  Reconciled Home Medications:      Medication List        START taking these medications      fluticasone propionate 50 mcg/actuation nasal spray  Commonly known as: FLONASE  2 sprays (100 mcg total) by Each Nostril route once daily.  Start taking on: August 15, 2024            CHANGE how you take these medications      INV hydrALAZINE 25 MG Tab  Commonly known as: APRESOLINE  Take 4 tablets (100 mg total) by mouth every 8 (eight) hours.  What changed: medication  strength            CONTINUE taking these medications      acetaminophen 325 MG tablet  Commonly known as: TYLENOL  Take 650 mg by mouth every 6 (six) hours as needed.     albuterol 2.5 mg /3 mL (0.083 %) nebulizer solution  Commonly known as: PROVENTIL  Inhale 2.5 mg into the lungs every 6 (six) hours as needed.     buPROPion 100 MG tablet  Commonly known as: WELLBUTRIN  Take 100 mg by mouth every evening.     busPIRone 5 MG Tab  Commonly known as: BUSPAR  Take 5 mg by mouth 2 (two) times daily.     famotidine 20 MG tablet  Commonly known as: PEPCID  Take 20 mg by mouth once daily.     isosorbide mononitrate 30 MG 24 hr tablet  Commonly known as: IMDUR  Take 30 mg by mouth once daily.     lactulose 10 gram/15 mL solution  Commonly known as: CHRONULAC  Take 20 g by mouth. Every day on Monday, Wednesday, Friday, and Sunday     melatonin 3 mg Cap  Take 9 mg by mouth every evening.     minocycline 100 MG capsule  Commonly known as: MINOCIN,DYNACIN  Take 100 mg by mouth 2 (two) times daily.     mirtazapine 7.5 MG Tab  Commonly known as: REMERON  Take 7.5 mg by mouth every evening.     nitroGLYCERIN 0.4 MG SL tablet  Commonly known as: NITROSTAT  Place 0.4 mg under the tongue every 5 (five) minutes as needed for Chest pain.     ondansetron 4 MG Tbdl  Commonly known as: ZOFRAN-ODT  Take 4 mg by mouth every 8 (eight) hours as needed.     PERCOCET  mg per tablet  Generic drug: oxyCODONE-acetaminophen  Take 1 tablet by mouth every 6 (six) hours as needed.     polyethylene glycol 17 gram Pwpk  Commonly known as: GLYCOLAX  Take 17 g by mouth once daily.     sevelamer carbonate 0.8 gram Pwpk  Commonly known as: RENVELA  Take 0.8 g by mouth 3 (three) times daily.     sodium hypochlorite external solution  Commonly known as: DAKINS  Apply topically once daily.            STOP taking these medications      carvediloL 25 MG tablet  Commonly known as: COREG     gentamicin 0.1 % ointment  Commonly known as: GARAMYCIN               Indwelling Lines/Drains at time of discharge:   Lines/Drains/Airways       Drain  Duration                  Hemodialysis AV Fistula Left upper arm -- days                    Time spent on the discharge of patient:  40 minutes         Ben Bee Jr, MD  Department of Hospital Medicine  [facility] - [facility] Tele[facility]

## 2024-08-15 LAB
ENA JO1 IGG SER-ACNC: <0.2 U
ENA RNP IGG SER IA-ACNC: 0.5 U
ENA SCL70 IGG SER IA-ACNC: 0.8 U
ENA SM IGG SER-ACNC: <0.2 U
ENA SS-A IGG SER-ACNC: 6.2 U
ENA SS-B IGG SER-ACNC: <0.2 U

## 2024-08-15 NOTE — PLAN OF CARE
[facility] Telemetry  Discharge Final Note    Primary Care Provider: Rehab, [facility]    Expected Discharge Date: 8/14/2024    Final Discharge Note (most recent)       Final Note - 08/15/24 0820          Final Note    Assessment Type Final Discharge Note     Anticipated Discharge Disposition Planned Readmission - Nursing Facility        Post-Acute Status    Post-Acute Authorization Placement     Post-Acute Placement Status Set-up Complete/Auth obtained     Patient choice form signed by patient/caregiver List from CMS Compare     Discharge Delays None known at this time                     Important Message from Medicare  Important Message from Medicare regarding Discharge Appeal Rights: Signed/date by patient/caregiver     Date IMM was signed: 08/13/24  Time IMM was signed: 1423      Pt returned to [facility] in [place]

## 2024-08-15 NOTE — DIALYSIS ROUNDING
The patient was dialyzed today without complication.There was a net fluid removal of 2.5 liters.She received two units of PRBCS during the treatment.

## 2024-08-16 LAB
BACTERIA BLD CULT: NORMAL
BACTERIA BLD CULT: NORMAL

## 2024-08-22 ENCOUNTER — HOSPITAL ENCOUNTER (EMERGENCY)
Facility: HOSPITAL | Age: 34
Discharge: HOME | End: 2024-08-22
Payer: MEDICARE

## 2024-08-22 VITALS
SYSTOLIC BLOOD PRESSURE: 143 MMHG | RESPIRATION RATE: 18 BRPM | TEMPERATURE: 99 F | OXYGEN SATURATION: 99 % | HEART RATE: 103 BPM | BODY MASS INDEX: 21.33 KG/M2 | WEIGHT: 144 LBS | HEIGHT: 69 IN | DIASTOLIC BLOOD PRESSURE: 99 MMHG

## 2024-08-22 DIAGNOSIS — R04.0 EPISTAXIS: Primary | ICD-10-CM

## 2024-08-22 LAB
ALBUMIN SERPL BCP-MCNC: 2 G/DL (ref 3.5–5)
ALBUMIN/GLOB SERPL: 0.3 {RATIO}
ALP SERPL-CCNC: 145 U/L (ref 37–98)
ALT SERPL W P-5'-P-CCNC: 9 U/L (ref 13–56)
ANION GAP SERPL CALCULATED.3IONS-SCNC: 15 MMOL/L (ref 7–16)
APTT PPP: 39 SECONDS (ref 25.2–37.3)
AST SERPL W P-5'-P-CCNC: 24 U/L (ref 15–37)
BASOPHILS # BLD AUTO: 0 K/UL (ref 0–0.2)
BASOPHILS NFR BLD AUTO: 0 % (ref 0–1)
BILIRUB SERPL-MCNC: 0.6 MG/DL (ref ?–1.2)
BUN SERPL-MCNC: 71 MG/DL (ref 7–18)
BUN/CREAT SERPL: 12 (ref 6–20)
CALCIUM SERPL-MCNC: 7.7 MG/DL (ref 8.5–10.1)
CHLORIDE SERPL-SCNC: 103 MMOL/L (ref 98–107)
CO2 SERPL-SCNC: 29 MMOL/L (ref 21–32)
CREAT SERPL-MCNC: 6.1 MG/DL (ref 0.55–1.02)
DIFFERENTIAL METHOD BLD: ABNORMAL
EGFR (NO RACE VARIABLE) (RUSH/TITUS): 9 ML/MIN/1.73M2
EOSINOPHIL # BLD AUTO: 0.18 K/UL (ref 0–0.5)
EOSINOPHIL NFR BLD AUTO: 2.4 % (ref 1–4)
ERYTHROCYTE [DISTWIDTH] IN BLOOD BY AUTOMATED COUNT: 20.2 % (ref 11.5–14.5)
GLOBULIN SER-MCNC: 6.8 G/DL (ref 2–4)
GLUCOSE SERPL-MCNC: 103 MG/DL (ref 74–106)
HCT VFR BLD AUTO: 23.8 % (ref 38–47)
HGB BLD-MCNC: 7.1 G/DL (ref 12–16)
INR BLD: 1.39
LYMPHOCYTES # BLD AUTO: 1.44 K/UL (ref 1–4.8)
LYMPHOCYTES NFR BLD AUTO: 19.6 % (ref 27–41)
MCH RBC QN AUTO: 27.6 PG (ref 27–31)
MCHC RBC AUTO-ENTMCNC: 29.8 G/DL (ref 32–36)
MCV RBC AUTO: 92.6 FL (ref 80–96)
MONOCYTES # BLD AUTO: 0.91 K/UL (ref 0–0.8)
MONOCYTES NFR BLD AUTO: 12.4 % (ref 2–6)
MPC BLD CALC-MCNC: 8.9 FL (ref 9.4–12.4)
NEUTROPHILS # BLD AUTO: 4.83 K/UL (ref 1.8–7.7)
NEUTROPHILS NFR BLD AUTO: 65.6 % (ref 53–65)
PLATELET # BLD AUTO: 143 K/UL (ref 150–400)
POTASSIUM SERPL-SCNC: 5.9 MMOL/L (ref 3.5–5.1)
PROT SERPL-MCNC: 8.8 G/DL (ref 6.4–8.2)
PROTHROMBIN TIME: 17.8 SECONDS (ref 11.7–14.7)
RBC # BLD AUTO: 2.57 M/UL (ref 4.2–5.4)
SODIUM SERPL-SCNC: 141 MMOL/L (ref 136–145)
WBC # BLD AUTO: 7.36 K/UL (ref 4.5–11)

## 2024-08-22 PROCEDURE — 99284 EMERGENCY DEPT VISIT MOD MDM: CPT | Mod: 25,GF | Performed by: NURSE PRACTITIONER

## 2024-08-22 PROCEDURE — 99284 EMERGENCY DEPT VISIT MOD MDM: CPT | Mod: 25

## 2024-08-22 PROCEDURE — 36415 COLL VENOUS BLD VENIPUNCTURE: CPT | Performed by: NURSE PRACTITIONER

## 2024-08-22 PROCEDURE — 25000003 PHARM REV CODE 250: Performed by: NURSE PRACTITIONER

## 2024-08-22 PROCEDURE — 30901 CONTROL OF NOSEBLEED: CPT | Performed by: NURSE PRACTITIONER

## 2024-08-22 PROCEDURE — 80053 COMPREHEN METABOLIC PANEL: CPT | Performed by: NURSE PRACTITIONER

## 2024-08-22 PROCEDURE — 85610 PROTHROMBIN TIME: CPT | Performed by: NURSE PRACTITIONER

## 2024-08-22 PROCEDURE — 96372 THER/PROPH/DIAG INJ SC/IM: CPT | Performed by: NURSE PRACTITIONER

## 2024-08-22 PROCEDURE — 85730 THROMBOPLASTIN TIME PARTIAL: CPT | Performed by: NURSE PRACTITIONER

## 2024-08-22 PROCEDURE — 85025 COMPLETE CBC W/AUTO DIFF WBC: CPT | Performed by: NURSE PRACTITIONER

## 2024-08-22 PROCEDURE — 63600175 PHARM REV CODE 636 W HCPCS: Performed by: NURSE PRACTITIONER

## 2024-08-22 PROCEDURE — 30901 CONTROL OF NOSEBLEED: CPT

## 2024-08-22 RX ORDER — ONDANSETRON HYDROCHLORIDE 2 MG/ML
4 INJECTION, SOLUTION INTRAVENOUS
Status: COMPLETED | OUTPATIENT
Start: 2024-08-22 | End: 2024-08-22

## 2024-08-22 RX ORDER — OXYMETAZOLINE HCL 0.05 %
1 SPRAY, NON-AEROSOL (ML) NASAL
Status: COMPLETED | OUTPATIENT
Start: 2024-08-22 | End: 2024-08-22

## 2024-08-22 RX ORDER — MINOCYCLINE HYDROCHLORIDE 100 MG/1
100 TABLET ORAL 2 TIMES DAILY
COMMUNITY

## 2024-08-22 RX ORDER — OXYCODONE AND ACETAMINOPHEN 5; 325 MG/1; MG/1
2 TABLET ORAL
Status: COMPLETED | OUTPATIENT
Start: 2024-08-22 | End: 2024-08-22

## 2024-08-22 RX ORDER — ONDANSETRON 4 MG/1
4 TABLET, FILM COATED ORAL 2 TIMES DAILY
Qty: 20 TABLET | Refills: 0 | Status: SHIPPED | OUTPATIENT
Start: 2024-08-22

## 2024-08-22 RX ADMIN — ONDANSETRON 4 MG: 2 INJECTION INTRAMUSCULAR; INTRAVENOUS at 06:08

## 2024-08-22 RX ADMIN — OXYMETAZOLINE HYDROCHLORIDE 1 SPRAY: 0.05 SPRAY NASAL at 07:08

## 2024-08-22 RX ADMIN — OXYCODONE HYDROCHLORIDE AND ACETAMINOPHEN 2 TABLET: 5; 325 TABLET ORAL at 06:08

## 2024-08-22 NOTE — ED TRIAGE NOTES
34 year old female presents to ed by [facility] with c/o nose bleed since last night.   Small amount of  intermittent bleeding noted.  Patient reports testing positive for covid on 8/11/2024.   Patient reports having headache, fatigue and body aches. Patient is schedule for dialysis today at 1215.    Normal schedule for dialysis is Tuesday, Thursday and Saturday.  Patient reports wound to buttocks

## 2024-08-22 NOTE — ED PROVIDER NOTES
Encounter Date: 8/22/2024       History     Chief Complaint   Patient presents with    Epistaxis     Received per EMS with report of epistaxis onset yesterday evening, has no active bleeding on arrival to ED     The history is provided by the patient. No  was used.     Review of patient's allergies indicates:   Allergen Reactions    Iodine Swelling     Causes swelling, itching , and nausea.    Strawberry Anaphylaxis, Swelling and Other (See Comments)     Past Medical History:   Diagnosis Date    Anemia in chronic kidney disease     Anemia, unspecified     Asthma     Atypical anxiety disorder     Calciphylaxis     severe failed 8 weeks of thiosulfate tx in [place]   see dc summary    Encounter for blood transfusion     multiple    ESRD (end stage renal disease) on dialysis     HD   TTS    GERD (gastroesophageal reflux disease)     Marasmic kwashiorkor     SLE (systemic lupus erythematosus)      Past Surgical History:   Procedure Laterality Date    AV FISTULA PLACEMENT Left 03/08/2023    with Banding of left AV fistula: Surgeon Rodrigue Lowery Jr. MD    DEBRIDEMENT OF LOWER EXTREMITY Bilateral 4/18/2024    Procedure: DEBRIDEMENT, LOWER EXTREMITY;  Surgeon: Henok Gage DO;  Location: [facility] OR;  Service: General;  Laterality: Bilateral;    DEBRIDEMENT OF LOWER EXTREMITY Bilateral 4/24/2024    Procedure: DEBRIDEMENT, LOWER EXTREMITY;  Surgeon: Henok Gage DO;  Location: [facility] OR;  Service: General;  Laterality: Bilateral;    DEBRIDEMENT OF SACRAL WOUND N/A 4/18/2024    Procedure: DEBRIDEMENT, WOUND, SACRUM;  Surgeon: Henok Gage DO;  Location: [facility] OR;  Service: General;  Laterality: N/A;    DEBRIDEMENT OF SACRAL WOUND N/A 4/24/2024    Procedure: DEBRIDEMENT, WOUND, SACRUM;  Surgeon: Henok Gage DO;  Location: [facility] OR;  Service: General;  Laterality: N/A;    DIALYSIS FISTULA CREATION Left 06/02/2022    Upper extremity by Ben Hager MD    TUBAL  LIGATION       Family History   Problem Relation Name Age of Onset    Hypertension Mother      Hypertension Father      Diabetes Daughter      Cancer Maternal Aunt          breast cancer     Social History     Tobacco Use    Smoking status: Former     Current packs/day: 1.00     Average packs/day: 1 pack/day for 4.3 years (4.3 ttl pk-yrs)     Types: Cigarettes     Start date: 5/6/2020     Quit date: 5/6/2015    Smokeless tobacco: Never   Substance Use Topics    Alcohol use: Never    Drug use: Never     Review of Systems   Constitutional:  Positive for fatigue.   HENT:  Positive for nosebleeds.    Respiratory:  Positive for cough.    All other systems reviewed and are negative.      Physical Exam     Initial Vitals [08/22/24 0532]   BP Pulse Resp Temp SpO2   (!) 170/114 100 18 98.9 °F (37.2 °C) 98 %      MAP       --         Physical Exam    Constitutional: She appears well-developed and well-nourished.   HENT:   Head: Normocephalic and atraumatic.   Right Ear: External ear normal.   Left Ear: External ear normal.   Mouth/Throat: Oropharynx is clear and moist.   Has blood in posterior area of both nares, no anterior blood at time of arrival to ED   Eyes: Conjunctivae are normal. Pupils are equal, round, and reactive to light.   Neck: Neck supple.   Normal range of motion.  Cardiovascular:  Normal rate, regular rhythm, normal heart sounds and intact distal pulses.           Pulmonary/Chest: Breath sounds normal.   Abdominal: Abdomen is soft. Bowel sounds are normal.   Musculoskeletal:         General: Normal range of motion.      Cervical back: Normal range of motion and neck supple.     Neurological: She is alert and oriented to person, place, and time. GCS score is 15. GCS eye subscore is 4. GCS verbal subscore is 5. GCS motor subscore is 6.   Skin: Skin is warm and dry. Capillary refill takes less than 2 seconds.         Medical Screening Exam   See Full Note    ED Course   Procedures  Labs Reviewed   COMPREHENSIVE  METABOLIC PANEL - Abnormal       Result Value    Sodium 141      Potassium 5.9 (*)     Chloride 103      CO2 29      Anion Gap 15      Glucose 103      BUN 71 (*)     Creatinine 6.10 (*)     BUN/Creatinine Ratio 12      Calcium 7.7 (*)     Total Protein 8.8 (*)     Albumin 2.0 (*)     Globulin 6.8 (*)     A/G Ratio 0.3      Bilirubin, Total 0.6      Alk Phos 145 (*)     ALT 9 (*)     AST 24      eGFR 9 (*)    PT AND PTT - Abnormal    PT 17.8 (*)     INR 1.39      PTT 39.0 (*)    CBC WITH DIFFERENTIAL - Abnormal    WBC 7.36      RBC 2.57 (*)     Hemoglobin 7.1 (*)     Hematocrit 23.8 (*)     MCV 92.6      MCH 27.6      MCHC 29.8 (*)     RDW 20.2 (*)     Platelet Count 143 (*)     MPV 8.9 (*)     Neutrophils % 65.6 (*)     Lymphocytes % 19.6 (*)     Neutrophils, Abs 4.83      Lymphocytes, Absolute 1.44      Diff Type Scan Smear      Monocytes % 12.4 (*)     Eosinophils % 2.4      Basophils % 0.0      Monocytes, Absolute 0.91 (*)     Eosinophils, Absolute 0.18      Basophils, Absolute 0.00     CBC W/ AUTO DIFFERENTIAL    Narrative:     The following orders were created for panel order CBC auto differential.  Procedure                               Abnormality         Status                     ---------                               -----------         ------                     CBC with Differential[[id number]]       Abnormal            Final result                 Please view results for these tests on the individual orders.          Imaging Results              X-Ray Chest 1 View (Final result)  Result time 08/22/24 07:22:02      Final result by Phillip Fu II, MD (08/22/24 07:22:02)                   Impression:      No evidence of cardiopulmonary disease.      Electronically signed by: Phillip Fu  Date:    08/22/2024  Time:    07:22               Narrative:    EXAMINATION:  XR CHEST 1 VIEW    CLINICAL HISTORY:  cough;    COMPARISON:  13 August 2024    TECHNIQUE:  XR CHEST 1 VIEW    FINDINGS:  The heart  and mediastinum are normal in size and configuration.  The pulmonary vascularity is normal in caliber.  No lung infiltrates, effusions, pneumothorax or other abnormality is demonstrated.                                       Medications   oxyCODONE-acetaminophen 5-325 mg per tablet 2 tablet (2 tablets Oral Given 8/22/24 0639)   ondansetron injection 4 mg (4 mg Intramuscular Given 8/22/24 0658)   oxymetazoline 0.05 % nasal spray 1 spray (1 spray Each Nostril Given 8/22/24 0714)     Medical Decision Making  Amount and/or Complexity of Data Reviewed  Labs: ordered.  Radiology: ordered.    Risk  OTC drugs.  Prescription drug management.               ED Course as of 08/22/24 0913   u Aug 22, 2024   0607 Report given to Cedrick Segura NP [BP]   0818 We are attempting to get patient an appointment with Dr. Sapp today. [BC]   0907 Patient has dialysis today.  We have discussed the need to follow up with ENT with [facility].  They agree to get her there tomorrow.  We are scheduling an appointment online with Dr. Sapp.  Nursing home indicates they we will get her there without any difficulty.  We are discharging patient home so she can have her dialysis today at noon.  She is in no distress and her vital signs are stable.  Her bleeding is well controlled with nasal packing and Isaiah-Synephrine [BC]      ED Course User Index  [BC] Cedrick Segura NP  [BP] Marc Penn NP                             Clinical Impression:   Final diagnoses:  [R04.0] Epistaxis (Primary)        ED Disposition Condition    Discharge Stable          ED Prescriptions       Medication Sig Dispense Start Date End Date Auth. Provider    ondansetron (ZOFRAN) 4 MG tablet Take 1 tablet (4 mg total) by mouth 2 (two) times daily. 20 tablet 8/22/2024 -- Cedrick Segura NP          Follow-up Information       Follow up With Specialties Details Why Contact Info    Rehab, [facility]  In 3 days As needed, If symptoms worsen  [address] [facility] [place] [address]  [phone]      Kavin Sapp MD Otolaryngology Go on 8/23/2024 11AM [address] [facility] [address]  [phone]               Cedrick Segura NP  08/22/24 0988

## 2024-08-22 NOTE — DISCHARGE INSTRUCTIONS
Follow-up with Dr. Sapp tomorrow at 11:00 a.m. in his office in [place].  Return to the emergency department for any concerns.

## 2024-09-10 ENCOUNTER — HOSPITAL ENCOUNTER (EMERGENCY)
Facility: HOSPITAL | Age: 34
Discharge: HOME | End: 2024-09-10
Payer: MEDICARE

## 2024-09-10 VITALS
WEIGHT: 144 LBS | SYSTOLIC BLOOD PRESSURE: 156 MMHG | TEMPERATURE: 98 F | RESPIRATION RATE: 14 BRPM | HEART RATE: 108 BPM | HEIGHT: 69 IN | DIASTOLIC BLOOD PRESSURE: 115 MMHG | BODY MASS INDEX: 21.33 KG/M2 | OXYGEN SATURATION: 95 %

## 2024-09-10 DIAGNOSIS — S16.1XXA CERVICAL STRAIN, ACUTE, INITIAL ENCOUNTER: ICD-10-CM
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
## 2024-09-10 DIAGNOSIS — S70.02XA CONTUSION OF LEFT HIP, INITIAL ENCOUNTER: ICD-10-CM

## 2024-09-10 DIAGNOSIS — S40.012A CONTUSION OF LEFT SHOULDER, INITIAL ENCOUNTER: ICD-10-CM
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
## 2024-09-10 PROCEDURE — 63600175 PHARM REV CODE 636 W HCPCS

## 2024-09-10 PROCEDURE — 99285 EMERGENCY DEPT VISIT HI MDM: CPT | Mod: 25

## 2024-09-10 PROCEDURE — 99284 EMERGENCY DEPT VISIT MOD MDM: CPT | Mod: GF

## 2024-09-10 PROCEDURE — 96374 THER/PROPH/DIAG INJ IV PUSH: CPT

## 2024-09-10 PROCEDURE — 96375 TX/PRO/DX INJ NEW DRUG ADDON: CPT

## 2024-09-10 RX ORDER — MORPHINE SULFATE 4 MG/ML
4 INJECTION, SOLUTION INTRAMUSCULAR; INTRAVENOUS
Status: COMPLETED | OUTPATIENT
Start: 2024-09-10 | End: 2024-09-10

## 2024-09-10 RX ORDER — ONDANSETRON HYDROCHLORIDE 2 MG/ML
4 INJECTION, SOLUTION INTRAVENOUS
Status: COMPLETED | OUTPATIENT
Start: 2024-09-10 | End: 2024-09-10

## 2024-09-10 RX ADMIN — MORPHINE SULFATE 4 MG: 4 INJECTION, SOLUTION INTRAMUSCULAR; INTRAVENOUS at 12:09

## 2024-09-10 RX ADMIN — ONDANSETRON 4 MG: 2 INJECTION INTRAMUSCULAR; INTRAVENOUS at 12:09

## 2024-09-10 NOTE — ED PROVIDER NOTES
Encounter Date: 9/10/2024       History     Chief Complaint   Patient presents with    Fall     TW is a 35 y/o AAF with a PMHx significant for anemia of chronic disease, anxiety disorder, end-stage renal disease, GERD, and systemic lupus who presents to the emergency department via EMS with complaint injuries sustained in fall.  Patient is a resident [facility], patient stated she got up out of her Amna chair lost her balance and fell.  Patient stated that she struck the left side of her head, her left shoulder, and her left hip floor.  Patient has some mild cervical spine tenderness.  Patient does suffer from chronic pain, so this pain may not be acute.  No injury or deformity noted.  No step-offs noted upon spinal assessment. Patient AAOx'4, has an afocal neuro exam. Patient denies any LOC. Patient was getting ready to go to dialysis when she fell.     The history is provided by the patient and the EMS personnel.     Review of patient's allergies indicates:   Allergen Reactions    Iodine Swelling     Causes swelling, itching , and nausea.    Strawberry Anaphylaxis, Swelling and Other (See Comments)     Past Medical History:   Diagnosis Date    Anemia in chronic kidney disease     Anemia, unspecified     Asthma     Atypical anxiety disorder     Calciphylaxis     severe failed 8 weeks of thiosulfate tx in [place]   see dc summary    Encounter for blood transfusion     multiple    ESRD (end stage renal disease) on dialysis     HD   TTS    GERD (gastroesophageal reflux disease)     Marasmic kwashiorkor     SLE (systemic lupus erythematosus)      Past Surgical History:   Procedure Laterality Date    AV FISTULA PLACEMENT Left 03/08/2023    with Banding of left AV fistula: Surgeon Rodrigue Loweyr Jr. MD    DEBRIDEMENT OF LOWER EXTREMITY Bilateral 4/18/2024    Procedure: DEBRIDEMENT, LOWER EXTREMITY;  Surgeon: Henok Gage DO;  Location: [facility] OR;  Service: General;  Laterality:  Bilateral;    DEBRIDEMENT OF LOWER EXTREMITY Bilateral 4/24/2024    Procedure: DEBRIDEMENT, LOWER EXTREMITY;  Surgeon: Henok Gage DO;  Location: [facility] OR;  Service: General;  Laterality: Bilateral;    DEBRIDEMENT OF SACRAL WOUND N/A 4/18/2024    Procedure: DEBRIDEMENT, WOUND, SACRUM;  Surgeon: Henok Gage DO;  Location: [facility] OR;  Service: General;  Laterality: N/A;    DEBRIDEMENT OF SACRAL WOUND N/A 4/24/2024    Procedure: DEBRIDEMENT, WOUND, SACRUM;  Surgeon: Henok Gage DO;  Location: [facility] OR;  Service: General;  Laterality: N/A;    DIALYSIS FISTULA CREATION Left 06/02/2022    Upper extremity by Ben Hager MD    TUBAL LIGATION       Family History   Problem Relation Name Age of Onset    Hypertension Mother      Hypertension Father      Diabetes Daughter      Cancer Maternal Aunt          breast cancer     Social History     Tobacco Use    Smoking status: Every Day     Current packs/day: 1.00     Average packs/day: 1 pack/day for 4.3 years (4.3 ttl pk-yrs)     Types: Cigarettes     Start date: 5/6/2020     Last attempt to quit: 5/6/2015     Passive exposure: Never    Smokeless tobacco: Never   Substance Use Topics    Alcohol use: Never    Drug use: Never     Review of Systems   Constitutional: Negative.    HENT: Negative.     Eyes: Negative.    Respiratory: Negative.     Cardiovascular: Negative.    Gastrointestinal: Negative.    Endocrine: Negative.    Genitourinary: Negative.    Musculoskeletal:  Positive for arthralgias, neck pain and neck stiffness. Negative for back pain, gait problem, joint swelling and myalgias.   Skin: Negative.    Allergic/Immunologic: Negative.    Neurological:  Positive for dizziness and headaches.   Hematological: Negative.    Psychiatric/Behavioral: Negative.         Physical Exam     Initial Vitals [09/10/24 1202]   BP Pulse Resp Temp SpO2   (!) 159/113 109 (!) 21 98.3 °F (36.8 °C) 99 %      MAP       --         Physical Exam    Nursing note and  vitals reviewed.  Constitutional: She appears well-developed and well-nourished. She is cooperative. She appears ill.   HENT:   Head: Normocephalic and atraumatic.   Right Ear: Hearing, tympanic membrane, external ear and ear canal normal.   Left Ear: Hearing, tympanic membrane, external ear and ear canal normal.   Nose: Nose normal. Right sinus exhibits no maxillary sinus tenderness and no frontal sinus tenderness. Left sinus exhibits no maxillary sinus tenderness and no frontal sinus tenderness.   Mouth/Throat: Uvula is midline, oropharynx is clear and moist and mucous membranes are normal.   Neck: Trachea normal and phonation normal. Neck supple. No thyroid mass and no thyromegaly present. No stridor present. No tracheal tenderness present. No tracheal deviation present.   Cardiovascular:  Normal rate, regular rhythm, S1 normal, S2 normal, normal heart sounds, intact distal pulses and normal pulses.           Pulmonary/Chest: Effort normal and breath sounds normal.   Abdominal: Bowel sounds are normal. She exhibits shifting dullness, distension and fluid wave. There is no abdominal tenderness. No hernia.   Musculoskeletal:      Left shoulder: Tenderness present. No swelling, deformity, effusion, laceration, bony tenderness or crepitus. Normal range of motion. Normal strength. Normal pulse.      Cervical back: Neck supple. No edema, erythema or rigidity. Spinous process tenderness and muscular tenderness present. Decreased range of motion.      Left hip: Tenderness present. No deformity, lacerations, bony tenderness or crepitus. Normal range of motion. Normal strength.     Neurological: She is alert and oriented to person, place, and time. She has normal strength and normal reflexes. She displays normal reflexes. No cranial nerve deficit or sensory deficit. She displays a negative Romberg sign. GCS eye subscore is 4. GCS verbal subscore is 5. GCS motor subscore is 6.   Skin: Skin is warm, dry and intact. Capillary  refill takes less than 2 seconds. No rash noted.   Psychiatric: She has a normal mood and affect. Her speech is normal and behavior is normal. Judgment and thought content normal. Cognition and memory are normal.         Medical Screening Exam   See Full Note    ED Course   Procedures  Labs Reviewed - No data to display       Imaging Results              X-Ray Hip 2 or 3 views Left with Pelvis when performed (Final result)  Result time 09/10/24 13:52:40      Final result by Henok Jaeger MD (09/10/24 13:52:40)                   Impression:      1. No acute displaced fracture or dislocation of the left hip.      Electronically signed by: Henok Jaeger MD  Date:    09/10/2024  Time:    13:52               Narrative:    EXAMINATION:  XR HIP WITH PELVIS WHEN PERFORMED 2 OR 3 VIEWS LEFT    CLINICAL HISTORY:  Unspecified fall, initial encounter    TECHNIQUE:  AP view of the pelvis and frog leg lateral view of the left hip were performed.    COMPARISON:  None    FINDINGS:  Two views left hip.    The left humeral head maintains appropriate relationship with the glenoid.  The pubic symphysis is intact.                                       X-Ray Shoulder 2 or More Views Left (Final result)  Result time 09/10/24 13:53:17      Final result by Henok Jaeger MD (09/10/24 13:53:17)                   Impression:      1. No acute displaced fracture or dislocation of the left shoulder.      Electronically signed by: Henok Jaeger MD  Date:    09/10/2024  Time:    13:53               Narrative:    EXAMINATION:  XR SHOULDER COMPLETE 2 OR MORE VIEWS LEFT    CLINICAL HISTORY:  fall;    TECHNIQUE:  Two or three views of the left shoulder were performed.    COMPARISON:  None    FINDINGS:  Three views left shoulder.    The left humeral head maintains appropriate relationship with the glenoid.  The acromioclavicular joint is intact.  No acute displaced left rib fracture.  The left lung zones are clear.  Electronic device  projects over the right lower lung zone.                                       CT Cervical Spine Without Contrast (Final result)  Result time 09/10/24 12:38:09      Final result by Ney Ramirez MD (09/10/24 12:38:09)                   Impression:      1. There is no acute cervical spine abnormality.      Electronically signed by: Ney Ramirez MD  Date:    09/10/2024  Time:    12:38               Narrative:    EXAMINATION:  CT CERVICAL SPINE WITHOUT CONTRAST    CLINICAL HISTORY:  fall;    TECHNIQUE:  Low dose axial images, sagittal and coronal reformations were preformed though the cervical spine.  Contrast was not administered.  The study is submitted in bone windows only.    COMPARISON:  None    FINDINGS:  Vertebral column: The cervical vertebral bodies maintain normal height and alignment.  There is no fracture or traumatic subluxation.  Bone density is normal.  The odontoid process is intact.  The anterior and posterior arches of C1 are normal.  The facet joints maintain normal articulation.  There is no significant disc space narrowing.    Spinal canal, cord, epidural space: The spinal canal is developmentally normal and widely patent.  There is no abnormal epidural collection/mass or hematoma.  There is no spinal stenosis or suspected cord compression.    There is no significant disc bulge or focal disc extrusion at any level.  There is no spinal canal or foraminal stenosis.    Soft tissues, other: The prevertebral soft tissues are normal.  The airway is patent.  The lung apices are clear.  The thyroid gland appears mildly prominent.  As seen on concurrent head CT, the parotid glands are heterogeneous in density with scattered calcifications.                                       CT Head Without Contrast (Final result)  Result time 09/10/24 12:35:50      Final result by Ney Ramirez MD (09/10/24 12:35:50)                   Impression:      1.  There is no acute intracranial abnormality.   There is no hemorrhage, mass/mass effect, acute edema or ischemia. There is no acute skull fracture.    2.  There is now complete opacification of the left sphenoid sinus.      Electronically signed by: Ney Ramirez MD  Date:    09/10/2024  Time:    12:35               Narrative:    EXAMINATION:  CT HEAD WITHOUT CONTRAST    CLINICAL HISTORY:  fall;    TECHNIQUE:  Routine unenhanced axial images were obtained through the head.  Sagittal and coronal reformatted images were created.  The study is reviewed in bone and soft tissue windows.    COMPARISON:  Sinus CT dated 08/13/2024    FINDINGS:  Intracranial contents: There is no acute intracranial abnormality.  There is no hemorrhage or mass/mass effect.  There is no hydrocephalus or midline shift.  There is no acute edema or ischemia.  The gray-white interface is preserved without obvious acute infarction.  There are no definite regions of abnormal attenuation in the brain.  There is no dense vessel.  There is no abnormal extra-axial fluid collection.  The basilar cisterns are open.  The cerebellar tonsils are in normal position at the level of the foramen magnum.    Extracranial contents, calvarium, soft tissues: The calvarium is normal.  There is mild mucosal thickening in the right maxillary sinus.  There is complete opacification of the left sphenoid sinus.  This represents some interval progression when compared to prior sinus CT dated 08/13/2024.  The mastoid air cells are clear.  Both parotid glands are heterogeneous in density and contains scattered calcifications.                                    X-Rays:   Independently Interpreted Readings:   Other Readings:  Reading Physician Reading Date Result Priority  Ney Ramirez MD  [phone] 9/10/2024 STAT    Narrative & Impression  EXAMINATION:  CT CERVICAL SPINE WITHOUT CONTRAST     CLINICAL HISTORY:  fall;     TECHNIQUE:  Low dose axial images, sagittal and coronal reformations were preformed  though the cervical spine.  Contrast was not administered.  The study is submitted in bone windows only.     COMPARISON:  None     FINDINGS:  Vertebral column: The cervical vertebral bodies maintain normal height and alignment.  There is no fracture or traumatic subluxation.  Bone density is normal.  The odontoid process is intact.  The anterior and posterior arches of C1 are normal.  The facet joints maintain normal articulation.  There is no significant disc space narrowing.     Spinal canal, cord, epidural space: The spinal canal is developmentally normal and widely patent.  There is no abnormal epidural collection/mass or hematoma.  There is no spinal stenosis or suspected cord compression.     There is no significant disc bulge or focal disc extrusion at any level.  There is no spinal canal or foraminal stenosis.     Soft tissues, other: The prevertebral soft tissues are normal.  The airway is patent.  The lung apices are clear.  The thyroid gland appears mildly prominent.  As seen on concurrent head CT, the parotid glands are heterogeneous in density with scattered calcifications.     Impression:     1. There is no acute cervical spine abnormality.        Electronically signed by:Ney Ramirez MD  Date:                                            09/10/2024  Time:                                           12:38        Reading Physician Reading Date Result Priority  Ney Ramirez MD  [phone] 9/10/2024 STAT    Narrative & Impression  EXAMINATION:  CT HEAD WITHOUT CONTRAST     CLINICAL HISTORY:  fall;     TECHNIQUE:  Routine unenhanced axial images were obtained through the head.  Sagittal and coronal reformatted images were created.  The study is reviewed in bone and soft tissue windows.     COMPARISON:  Sinus CT dated 08/13/2024     FINDINGS:  Intracranial contents: There is no acute intracranial abnormality.  There is no hemorrhage or mass/mass effect.  There is no hydrocephalus or midline  shift.  There is no acute edema or ischemia.  The gray-white interface is preserved without obvious acute infarction.  There are no definite regions of abnormal attenuation in the brain.  There is no dense vessel.  There is no abnormal extra-axial fluid collection.  The basilar cisterns are open.  The cerebellar tonsils are in normal position at the level of the foramen magnum.     Extracranial contents, calvarium, soft tissues: The calvarium is normal.  There is mild mucosal thickening in the right maxillary sinus.  There is complete opacification of the left sphenoid sinus.  This represents some interval progression when compared to prior sinus CT dated 08/13/2024.  The mastoid air cells are clear.  Both parotid glands are heterogeneous in density and contains scattered calcifications.     Impression:     1.  There is no acute intracranial abnormality.  There is no hemorrhage, mass/mass effect, acute edema or ischemia. There is no acute skull fracture.     2.  There is now complete opacification of the left sphenoid sinus.        Electronically signed by:Ney Ramirez MD  Date:                                            09/10/2024  Time:                                           12:35        Exam Ended: 09/10/24 12:28 CDT          Details    Reading Physician Reading Date Result Priority  Henok Jaeger MD  [phone] 9/10/2024 STAT    Narrative & Impression  EXAMINATION:  XR SHOULDER COMPLETE 2 OR MORE VIEWS LEFT     CLINICAL HISTORY:  fall;     TECHNIQUE:  Two or three views of the left shoulder were performed.     COMPARISON:  None     FINDINGS:  Three views left shoulder.     The left humeral head maintains appropriate relationship with the glenoid.  The acromioclavicular joint is intact.  No acute displaced left rib fracture.  The left lung zones are clear.  Electronic device projects over the right lower lung zone.     Impression:     1. No acute displaced fracture or dislocation of the left  shoulder.        Electronically signed by:Henok Jaeger MD  Date:                                            09/10/2024  Time:                                           13:53  Reading Physician Reading Date Result Priority  Henok Jaeger MD  [phone] 9/10/2024 STAT    Narrative & Impression  EXAMINATION:  XR HIP WITH PELVIS WHEN PERFORMED 2 OR 3 VIEWS LEFT     CLINICAL HISTORY:  Unspecified fall, initial encounter     TECHNIQUE:  AP view of the pelvis and frog leg lateral view of the left hip were performed.     COMPARISON:  None     FINDINGS:  Two views left hip.     The left humeral head maintains appropriate relationship with the glenoid.  The pubic symphysis is intact.     Impression:     1. No acute displaced fracture or dislocation of the left hip.        Electronically signed by:Henok Jaeger MD  Date:                                            09/10/2024  Time:                                           13:52        Exam Ended: 09/10/24 12:47 CDT                  Medications   morphine injection 4 mg (4 mg Intravenous Given 9/10/24 1257)   ondansetron injection 4 mg (4 mg Intravenous Given 9/10/24 1255)     Medical Decision Making  TW is a 35 y/o AAF with a PMHx significant for anemia of chronic disease, anxiety disorder, end-stage renal disease, GERD, and systemic lupus who presents to the emergency department via EMS with complaint injuries sustained in fall.  Patient is a resident [facility], patient stated she got up out of her Amna chair lost her balance and fell.  Patient stated that she struck the left side of her head, her left shoulder, and her left hip floor.  Patient has some mild cervical spine tenderness.  Patient does suffer from chronic pain, so this pain may not be acute.  No injury or deformity noted.  No step-offs noted upon spinal assessment. Patient AAOx'4, has an afocal neuro exam. Patient denies any LOC. Patient was getting ready to go to dialysis when she  fell.     The history is provided by the patient and the EMS personnel.       Amount and/or Complexity of Data Reviewed  Radiology: ordered.    Risk  Prescription drug management.  Risk Details: Fall   Cervical neck strain   Left shoulder contusion   Left hip contusion               ED Course as of 09/10/24 1410   Tue Sep 10, 2024   1407 X-ray images and report reviewed by me and discussed with patient, discharge instructions given along with strict return precautions patient verbalizes understanding. [AC]      ED Course User Index  [AC] Arsenio Garcia FNP                           Clinical Impression:   Final diagnoses:  [W19.XXXA] Fall  [W19.XXXA] Fall, initial encounter (Primary)  [S40.012A] Contusion of left shoulder, initial encounter  [S70.02XA] Contusion of left hip, initial encounter  [S16.1XXA] Cervical strain, acute, initial encounter        ED Disposition Condition    Discharge Stable          ED Prescriptions    None       Follow-up Information       Follow up With Specialties Details Why Contact Info    Rehab, [facility]    [address] [place] [address] [place] [id number] [phone]               Arsenio Garcia FNP  09/10/24 1410

## 2024-09-18 ENCOUNTER — OFFICE VISIT (OUTPATIENT)
Dept: OTOLARYNGOLOGY | Facility: CLINIC | Age: 34
End: 2024-09-18
Payer: MEDICARE

## 2024-09-18 VITALS — BODY MASS INDEX: 21.33 KG/M2 | WEIGHT: 144 LBS | HEIGHT: 69 IN

## 2024-09-18 DIAGNOSIS — J34.89 NASAL SEPTUM PERFORATION: Primary | ICD-10-CM

## 2024-09-18 DIAGNOSIS — R04.0 EPISTAXIS: ICD-10-CM

## 2024-09-18 PROCEDURE — 99204 OFFICE O/P NEW MOD 45 MIN: CPT | Mod: S$PBB,,, | Performed by: OTOLARYNGOLOGY

## 2024-09-18 PROCEDURE — 99999 PR PBB SHADOW E&M-EST. PATIENT-LVL V: CPT | Mod: PBBFAC,,, | Performed by: OTOLARYNGOLOGY

## 2024-09-18 PROCEDURE — 99215 OFFICE O/P EST HI 40 MIN: CPT | Mod: PBBFAC | Performed by: OTOLARYNGOLOGY

## 2024-09-18 NOTE — PROGRESS NOTES
Subjective:       Patient ID: Madhav Correa is a 34 y.o. female.    Chief Complaint: Epistaxis (Patient referred from [facility] for nose bleeds. Patient denies taking blood thinners.she complains of having nose bleeds 4-5 times a week.)    Epistaxis       Review of Systems   HENT:  Positive for nosebleeds and sinus pain.    All other systems reviewed and are negative.      Objective:      Physical Exam  General: NAD  Head: Normocephalic, atraumatic, no facial asymmetry/normal strength,  Ears: Both auricules normal in appearance, w/o deformities tympanic membranes normal external auditory canals normal  Nose: External nose w/o deformities normal turbinates no drainage or inflammation Large septal perforation with crust around edges   Oral Cavity: Lips, gums, floor of mouth, tongue hard palate, and buccal mucosa without mass/lesion  Oropharynx: Mucosa pink and moist, soft palate, posterior pharynx and oropharyngeal wall without mass/lesion  Neck: Supple, symmetric, trachea midline, no palpable mass/lesion, no palpable cervical lymphadenopathy  Skin: Warm and dry, no concerning lesions  Respiratory: Respirations even, unlabored  Assessment:       1. Nasal septum perforation    2. Epistaxis        Plan:       Septal button placement in OR

## 2024-09-23 PROCEDURE — 87070 CULTURE OTHR SPECIMN AEROBIC: CPT | Performed by: FAMILY MEDICINE

## 2024-09-23 PROCEDURE — 87077 CULTURE AEROBIC IDENTIFY: CPT | Performed by: FAMILY MEDICINE

## 2024-09-25 ENCOUNTER — LAB REQUISITION (OUTPATIENT)
Dept: LAB | Facility: HOSPITAL | Age: 34
End: 2024-09-25
Attending: FAMILY MEDICINE

## 2024-09-25 DIAGNOSIS — L02.415 CUTANEOUS ABSCESS OF RIGHT LOWER LIMB: ICD-10-CM

## 2024-09-27 ENCOUNTER — TELEPHONE (OUTPATIENT)
Dept: OTOLARYNGOLOGY | Facility: CLINIC | Age: 34
End: 2024-09-27
Payer: MEDICARE

## 2024-09-29 LAB — MICROORGANISM SPEC CULT: ABNORMAL

## (undated) DEVICE — GOWN POLY REINF BRTH SLV XL

## (undated) DEVICE — GLOVE SENSICARE PI GRN 7

## (undated) DEVICE — GLOVE SENSICARE PI GRN 8

## (undated) DEVICE — SPONGE LAP 18X18 PREWASHED

## (undated) DEVICE — KIT BASIC RUSH

## (undated) DEVICE — SPONGE COTTON TRAY 4X4IN

## (undated) DEVICE — DERM CURETTE 5MM DISP

## (undated) DEVICE — GLOVE SENSICARE PI SURG 6.5

## (undated) DEVICE — GLOVE SENSICARE PI SURG 8

## (undated) DEVICE — GLOVE SENSICARE PI SURG 7.5

## (undated) DEVICE — TRAY SKIN SCRUB WET PREMIUM

## (undated) DEVICE — SYS IRRISEPT 450ML0.05% CHG

## (undated) DEVICE — PACK UNIV PROCEDURE

## (undated) DEVICE — PAD ABDOMINAL 8X7.5 STERILE

## (undated) DEVICE — GLOVE 8.5 PROTEXIS PI BLUE

## (undated) DEVICE — GOWN NONREINF SET-IN SLV 2XL

## (undated) DEVICE — GLOVE SENSICARE PI GRN 6.5

## (undated) DEVICE — SOL NACL IRR 1000ML BTL